# Patient Record
Sex: MALE | Race: WHITE | Employment: FULL TIME | URBAN - METROPOLITAN AREA
[De-identification: names, ages, dates, MRNs, and addresses within clinical notes are randomized per-mention and may not be internally consistent; named-entity substitution may affect disease eponyms.]

---

## 2020-09-18 ENCOUNTER — APPOINTMENT (OUTPATIENT)
Dept: CT IMAGING | Age: 60
End: 2020-09-18
Payer: COMMERCIAL

## 2020-09-18 ENCOUNTER — HOSPITAL ENCOUNTER (EMERGENCY)
Age: 60
Discharge: HOME | End: 2020-09-18
Attending: EMERGENCY MEDICINE
Payer: COMMERCIAL

## 2020-09-18 VITALS
OXYGEN SATURATION: 96 % | WEIGHT: 155 LBS | RESPIRATION RATE: 16 BRPM | BODY MASS INDEX: 20.99 KG/M2 | SYSTOLIC BLOOD PRESSURE: 93 MMHG | HEART RATE: 71 BPM | HEIGHT: 72 IN | DIASTOLIC BLOOD PRESSURE: 56 MMHG | TEMPERATURE: 98 F

## 2020-09-18 PROCEDURE — 70450 CT HEAD/BRAIN W/O DYE: CPT

## 2020-09-18 PROCEDURE — 6370000000 HC RX 637 (ALT 250 FOR IP): Performed by: EMERGENCY MEDICINE

## 2020-09-18 PROCEDURE — 99283 EMERGENCY DEPT VISIT LOW MDM: CPT

## 2020-09-18 PROCEDURE — 12001 RPR S/N/AX/GEN/TRNK 2.5CM/<: CPT

## 2020-09-18 RX ORDER — ACETAMINOPHEN 500 MG
1000 TABLET ORAL ONCE
Status: DISCONTINUED | OUTPATIENT
Start: 2020-09-18 | End: 2020-09-18 | Stop reason: HOSPADM

## 2020-09-18 RX ORDER — DIAPER,BRIEF,INFANT-TODD,DISP
EACH MISCELLANEOUS ONCE
Status: COMPLETED | OUTPATIENT
Start: 2020-09-18 | End: 2020-09-18

## 2020-09-18 RX ADMIN — BACITRACIN: 500 OINTMENT TOPICAL at 09:57

## 2020-09-18 ASSESSMENT — ENCOUNTER SYMPTOMS
EYE DISCHARGE: 0
EYE PAIN: 0
SINUS PRESSURE: 0
BACK PAIN: 0
SORE THROAT: 0
WHEEZING: 0
VOICE CHANGE: 0
CHEST TIGHTNESS: 0
BLOOD IN STOOL: 0
FACIAL SWELLING: 0
DIARRHEA: 0
COUGH: 0
EYE REDNESS: 0
CONSTIPATION: 0
ABDOMINAL PAIN: 0
VOMITING: 0
SHORTNESS OF BREATH: 0
TROUBLE SWALLOWING: 0
STRIDOR: 0
CHOKING: 0

## 2020-09-18 NOTE — ED NOTES
Discharge instructions reviewed with pt and family. Wife will return with a [employer] to transport him home.      Otto Vernon RN  09/18/20 3970

## 2020-09-18 NOTE — ED NOTES
PT transferred himself from bed to his wheelchair as he usual does without difficulty.      Antoina Louis RN  09/18/20 1337

## 2020-09-18 NOTE — ED NOTES
Wound cleansed with H202 to expose wound. Approx 1/2\" scalp lac. Bleeding controlled without a dressing.      Otto Vernon RN  09/18/20 7253

## 2020-09-18 NOTE — ED NOTES
Problem: Pneumonia: Day 4  Goal: Off Pathway (Use only if patient is Off Pathway)  Outcome: Progressing Towards Goal     Problem: Falls - Risk of  Goal: *Absence of Falls  Description  Document Cielo Bustillo Fall Risk and appropriate interventions in the flowsheet. Outcome: Progressing Towards Goal  Pt. Unresponsive, bed locked and in low position, hourly rounds performed. Problem: Seizure Disorder (Adult)  Goal: *STG: Remains free of seizure activity  Outcome: Progressing Towards Goal  Pt. Remains from seizure activity, received the 2nd of IV Keppra. 2214: 1425 [facility] notified about Dr. William Finn, Teleneurologist recommendation to load Pt. With dilantin and reach out to neurologist, hospitalist BODØ also notified about Pt. Do not have DNR sheet on file, ordered to wait till morning, Dr. Soni Ribera will sign the DNR sheet. 2330: Dr. Sai Royal neurologist  notified about Dr. William Finn recommendation, and about no change in Pt. Condition as well. He will call back after reviewing the Pt. EEG.    2345: Dr. Stephen Ruby, neurologist ordered to give Fosphenytoin 1gm. 0030: Dr. Stephen Ruby, neurologist notified about change in Pt. Condition, opening eyes to speech, follows command and still on non rebreather. Ordered not to give Fosphenytoin since Pt. EEG showed no changes and continue with IV dose of Keppra. 0230: 1425 [facility] notified about change in Pt. Condition, unresponsive and not responding to sternal rub as well, Pt. Still on non- rebreather mask due to breathing through mouth, ordered to do Stat ABG's.    0306: Dr. Smith Call ordered to give Narcan 0.4mg.      0400: Dr. Smith Call assessed the Pt. And ordered to repeat CT head, Pt. Taken to CT scan with RN and Tech, Pt. On non-re breather mask during transportation and maintaining O2 sats of 97%     Bedside shift change report given to [address] [place] [address] (oncoming nurse) by Ani Valdez RN (offgoing nurse).  Report included the following information SBAR, To Ct with Yoan Kate RN  09/18/20 3415 Kardex, ED Summary, Procedure Summary, Intake/Output, MAR, Accordion, Recent Results, Med Rec Status, Cardiac Rhythm NSR to Sinus Tach and Quality Measures.
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
## 2020-09-25 ENCOUNTER — OFFICE VISIT (OUTPATIENT)
Dept: FAMILY MEDICINE CLINIC | Age: 60
End: 2020-09-25
Payer: COMMERCIAL

## 2020-09-25 VITALS
SYSTOLIC BLOOD PRESSURE: 118 MMHG | OXYGEN SATURATION: 97 % | HEIGHT: 72 IN | HEART RATE: 97 BPM | RESPIRATION RATE: 12 BRPM | WEIGHT: 155 LBS | DIASTOLIC BLOOD PRESSURE: 84 MMHG | BODY MASS INDEX: 20.99 KG/M2 | TEMPERATURE: 98 F

## 2020-09-25 PROCEDURE — 1036F TOBACCO NON-USER: CPT | Performed by: NURSE PRACTITIONER

## 2020-09-25 PROCEDURE — G8420 CALC BMI NORM PARAMETERS: HCPCS | Performed by: NURSE PRACTITIONER

## 2020-09-25 PROCEDURE — 99213 OFFICE O/P EST LOW 20 MIN: CPT | Performed by: NURSE PRACTITIONER

## 2020-09-25 PROCEDURE — 3017F COLORECTAL CA SCREEN DOC REV: CPT | Performed by: NURSE PRACTITIONER

## 2020-09-25 PROCEDURE — G8427 DOCREV CUR MEDS BY ELIG CLIN: HCPCS | Performed by: NURSE PRACTITIONER

## 2020-09-25 ASSESSMENT — PATIENT HEALTH QUESTIONNAIRE - PHQ9
2. FEELING DOWN, DEPRESSED OR HOPELESS: 0
1. LITTLE INTEREST OR PLEASURE IN DOING THINGS: 0
SUM OF ALL RESPONSES TO PHQ QUESTIONS 1-9: 0
SUM OF ALL RESPONSES TO PHQ9 QUESTIONS 1 & 2: 0
SUM OF ALL RESPONSES TO PHQ QUESTIONS 1-9: 0

## 2020-09-25 NOTE — PROGRESS NOTES
Subjective     Stevens Armida Luna 63 y.o. male presents 9/25/20 with   Chief Complaint   Patient presents with    Suture / Staple Removal     staples in back of head       HPI     Patient was in ED last Friday. Fell backwards in wheelchair and hit his head. Received a head laceration. Went to ED and had 5 staples placed to his laceration. Was told to come back in 7 days to have staples removed. He has been keeping the area clean with iodine. Has not noticed any drainage since the first day. Area is not tender or warm to touch. No fever/chills. Reviewed the following history:    Past Medical History:   Diagnosis Date    Bursitis of shoulder, left 04/29/2008    acute    Hemorrhoids, external 08/05/2004    Hemorrhoids, internal 07/18/2003    Paraplegia ([facility] [address] 04/29/2008    permanent    Spinal cord injury 07/23/2009    with paraplegia     Past Surgical History:   Procedure Laterality Date    COLONOSCOPY  07/18/2003    HEMORRHOID SURGERY  08/05/2004    Dr. Curtis Stewart  07/2008    left/Dr. Leonard Winter     No family history on file. Allergies   Allergen Reactions    Vicodin [Hydrocodone-Acetaminophen] Nausea Only       Current Outpatient Medications on File Prior to Visit   Medication Sig Dispense Refill    Alendronate Sodium (FOSAMAX PO) Take by mouth      clonazePAM (KLONOPIN) 2 MG tablet Take 1 tablet by mouth 2 times daily as needed for Anxiety. 60 tablet 1    ferrous sulfate 325 (65 FE) MG tablet Take 325 mg by mouth daily.  Calcium Carbonate-Vitamin D (CALCIUM + D PO) Take 600 mg by mouth daily.  Omega-3 Fatty Acids (FISH OIL) 500 MG CAPS Take 500 mg by mouth daily.  Cetirizine HCl (ZYRTEC PO) Take  by mouth as needed.  ibuprofen (ADVIL;MOTRIN) 600 MG tablet Take 600 mg by mouth as needed.  Multiple Vitamins-Minerals (CENTRUM SILVER PO) Take  by mouth daily.  levofloxacin (LEVAQUIN) 500 MG tablet Take 1 tablet by mouth daily.  (Patient not taking: Reported on 9/25/2020) 7 tablet 0    Risedronate Sodium (ACTONEL) 150 MG TABS Use  One tablet monthly. (Patient not taking: Reported on 9/25/2020) 1 tablet 6    doxycycline (VIBRAMYCIN) 100 MG capsule Take 1 capsule by mouth 2 times daily. (Patient not taking: Reported on 9/25/2020) 20 capsule 0     No current facility-administered medications on file prior to visit. Review of Systems   Constitutional: Negative for chills and fever. Skin:        Healing laceration to scalp       Objective    Vitals:    09/25/20 1452   BP: 118/84   Site: Right Upper Arm   Position: Sitting   Cuff Size: Medium Adult   Pulse: 97   Resp: 12   Temp: 98 °F (36.7 °C)   TempSrc: Temporal   SpO2: 97%   Weight: 155 lb (70.3 kg)   Height: 6' (1.829 m)       Physical Exam  Constitutional:       General: He is not in acute distress. Appearance: Normal appearance. He is not ill-appearing or toxic-appearing. HENT:      Head: Normocephalic and atraumatic. Right Ear: Hearing and external ear normal.      Left Ear: Hearing and external ear normal.   Eyes:      General: Lids are normal.   Neck:      Musculoskeletal: Full passive range of motion without pain, normal range of motion and neck supple. Cardiovascular:      Rate and Rhythm: Normal rate. Pulmonary:      Effort: Pulmonary effort is normal. No respiratory distress. Skin:     General: Skin is warm and dry. Neurological:      General: No focal deficit present. Mental Status: He is alert and oriented to person, place, and time. Assessment and Plan    Emily Pettibone was seen today for suture / staple removal.    Diagnoses and all orders for this visit:    Laceration of scalp without foreign body, subsequent encounter    Encounter for staple removal  -      - PA REMOVAL OF SUTURES      Procedures:  Healing laceration observed to occipital scalp. Large scab to area. Edges are well approximated. No open areas or drainage observed. No sign of infection. Area cleansed with betadine. 5 staples removed from laceration without difficulty. Area cleansed again with betadine. Return if symptoms worsen or fail to improve, for follow up with PCP. Side effects and adverse effects of any medication prescribed today, as well as treatment plan/rationale, follow-up care, and result expectations have been discussed with the patient. Expresses understanding and desires to proceed with treatment plan. Discussed signs and symptoms which require immediate follow-up in ED/call to 911. Understanding verbalized. I have reviewed and updated the electronic medical record.     Madai Monaco, RASHEED - CNP

## 2021-04-07 ENCOUNTER — HOSPITAL ENCOUNTER (INPATIENT)
Age: 61
LOS: 8 days | Discharge: ANOTHER HOSPITAL | DRG: 085 | End: 2021-04-15
Attending: SURGERY | Admitting: SURGERY
Payer: COMMERCIAL

## 2021-04-07 ENCOUNTER — APPOINTMENT (OUTPATIENT)
Dept: CT IMAGING | Age: 61
DRG: 085 | End: 2021-04-07
Payer: COMMERCIAL

## 2021-04-07 ENCOUNTER — APPOINTMENT (OUTPATIENT)
Dept: GENERAL RADIOLOGY | Age: 61
End: 2021-04-07
Payer: COMMERCIAL

## 2021-04-07 ENCOUNTER — APPOINTMENT (OUTPATIENT)
Dept: CT IMAGING | Age: 61
End: 2021-04-07
Payer: COMMERCIAL

## 2021-04-07 ENCOUNTER — HOSPITAL ENCOUNTER (EMERGENCY)
Age: 61
Discharge: ANOTHER HOSPITAL | End: 2021-04-07
Attending: EMERGENCY MEDICINE
Payer: COMMERCIAL

## 2021-04-07 VITALS
WEIGHT: 155 LBS | DIASTOLIC BLOOD PRESSURE: 70 MMHG | TEMPERATURE: 97 F | HEIGHT: 72 IN | SYSTOLIC BLOOD PRESSURE: 106 MMHG | RESPIRATION RATE: 20 BRPM | HEART RATE: 80 BPM | BODY MASS INDEX: 20.99 KG/M2 | OXYGEN SATURATION: 97 %

## 2021-04-07 DIAGNOSIS — R41.0 DELIRIUM: ICD-10-CM

## 2021-04-07 DIAGNOSIS — G82.20 PARAPLEGIA (HCC): ICD-10-CM

## 2021-04-07 DIAGNOSIS — E87.1 HYPONATREMIA: ICD-10-CM

## 2021-04-07 DIAGNOSIS — S09.90XA CLOSED HEAD INJURY, INITIAL ENCOUNTER: Primary | ICD-10-CM

## 2021-04-07 DIAGNOSIS — I60.9 SUBARACHNOID BLEED (HCC): Primary | ICD-10-CM

## 2021-04-07 DIAGNOSIS — I60.9 SUBARACHNOID BLEED (HCC): ICD-10-CM

## 2021-04-07 PROBLEM — Z83.511 FAMILY HISTORY OF GLAUCOMA IN FATHER: Status: ACTIVE | Noted: 2017-10-19

## 2021-04-07 PROBLEM — H52.13 MYOPIA, BILATERAL: Status: ACTIVE | Noted: 2017-10-19

## 2021-04-07 PROBLEM — N39.0 URINARY TRACT INFECTION WITHOUT HEMATURIA: Status: ACTIVE | Noted: 2018-05-18

## 2021-04-07 PROBLEM — H52.4 BILATERAL PRESBYOPIA: Status: ACTIVE | Noted: 2017-10-19

## 2021-04-07 PROBLEM — R30.0 DYSURIA: Status: ACTIVE | Noted: 2018-05-18

## 2021-04-07 PROBLEM — R91.1 PULMONARY NODULE, LEFT: Status: ACTIVE | Noted: 2019-03-01

## 2021-04-07 LAB
ALBUMIN SERPL-MCNC: 4.5 G/DL (ref 3.5–4.6)
ALP BLD-CCNC: 76 U/L (ref 35–104)
ALT SERPL-CCNC: 23 U/L (ref 0–41)
ANION GAP SERPL CALCULATED.3IONS-SCNC: 11 MEQ/L (ref 9–15)
ANION GAP SERPL CALCULATED.3IONS-SCNC: 14 MEQ/L (ref 9–15)
AST SERPL-CCNC: 22 U/L (ref 0–40)
BASOPHILS ABSOLUTE: 0 K/UL (ref 0–0.2)
BASOPHILS RELATIVE PERCENT: 0.6 %
BILIRUB SERPL-MCNC: 0.6 MG/DL (ref 0.2–0.7)
BUN BLDV-MCNC: 21 MG/DL (ref 6–20)
BUN BLDV-MCNC: 26 MG/DL (ref 6–20)
CALCIUM SERPL-MCNC: 8.7 MG/DL (ref 8.5–9.9)
CALCIUM SERPL-MCNC: 9.7 MG/DL (ref 8.5–9.9)
CHLORIDE BLD-SCNC: 88 MEQ/L (ref 95–107)
CHLORIDE BLD-SCNC: 90 MEQ/L (ref 95–107)
CO2: 22 MEQ/L (ref 20–31)
CO2: 25 MEQ/L (ref 20–31)
CREAT SERPL-MCNC: 0.32 MG/DL (ref 0.7–1.2)
CREAT SERPL-MCNC: 0.48 MG/DL (ref 0.7–1.2)
EOSINOPHILS ABSOLUTE: 0 K/UL (ref 0–0.7)
EOSINOPHILS RELATIVE PERCENT: 0.6 %
GFR AFRICAN AMERICAN: >60
GFR AFRICAN AMERICAN: >60
GFR NON-AFRICAN AMERICAN: >60
GFR NON-AFRICAN AMERICAN: >60
GLOBULIN: 2.5 G/DL (ref 2.3–3.5)
GLUCOSE BLD-MCNC: 124 MG/DL (ref 70–99)
GLUCOSE BLD-MCNC: 92 MG/DL (ref 70–99)
HCT VFR BLD CALC: 34.6 % (ref 42–52)
HEMOGLOBIN: 11.7 G/DL (ref 14–18)
INR BLD: 1.1
LYMPHOCYTES ABSOLUTE: 0.2 K/UL (ref 1–4.8)
LYMPHOCYTES RELATIVE PERCENT: 6.9 %
MCH RBC QN AUTO: 30.6 PG (ref 27–31.3)
MCHC RBC AUTO-ENTMCNC: 33.8 % (ref 33–37)
MCV RBC AUTO: 90.6 FL (ref 80–100)
MONOCYTES ABSOLUTE: 0.3 K/UL (ref 0.2–0.8)
MONOCYTES RELATIVE PERCENT: 11.4 %
NEUTROPHILS ABSOLUTE: 2.4 K/UL (ref 1.4–6.5)
NEUTROPHILS RELATIVE PERCENT: 80.5 %
PDW BLD-RTO: 13 % (ref 11.5–14.5)
PLATELET # BLD: 209 K/UL (ref 130–400)
POTASSIUM SERPL-SCNC: 4.2 MEQ/L (ref 3.4–4.9)
POTASSIUM SERPL-SCNC: 4.4 MEQ/L (ref 3.4–4.9)
PROTHROMBIN TIME: 13.8 SEC (ref 12.3–14.9)
RBC # BLD: 3.82 M/UL (ref 4.7–6.1)
SARS-COV-2, NAAT: NOT DETECTED
SODIUM BLD-SCNC: 124 MEQ/L (ref 135–144)
SODIUM BLD-SCNC: 126 MEQ/L (ref 135–144)
TOTAL PROTEIN: 7 G/DL (ref 6.3–8)
WBC # BLD: 2.9 K/UL (ref 4.8–10.8)

## 2021-04-07 PROCEDURE — APPSS15 APP SPLIT SHARED TIME 0-15 MINUTES: Performed by: PHYSICIAN ASSISTANT

## 2021-04-07 PROCEDURE — 2580000003 HC RX 258: Performed by: EMERGENCY MEDICINE

## 2021-04-07 PROCEDURE — 6360000002 HC RX W HCPCS: Performed by: EMERGENCY MEDICINE

## 2021-04-07 PROCEDURE — 51702 INSERT TEMP BLADDER CATH: CPT

## 2021-04-07 PROCEDURE — 87635 SARS-COV-2 COVID-19 AMP PRB: CPT

## 2021-04-07 PROCEDURE — 80048 BASIC METABOLIC PNL TOTAL CA: CPT

## 2021-04-07 PROCEDURE — 96365 THER/PROPH/DIAG IV INF INIT: CPT

## 2021-04-07 PROCEDURE — 2000000000 HC ICU R&B

## 2021-04-07 PROCEDURE — 2580000003 HC RX 258: Performed by: PHYSICIAN ASSISTANT

## 2021-04-07 PROCEDURE — 36415 COLL VENOUS BLD VENIPUNCTURE: CPT

## 2021-04-07 PROCEDURE — 70450 CT HEAD/BRAIN W/O DYE: CPT

## 2021-04-07 PROCEDURE — 6360000002 HC RX W HCPCS: Performed by: PHYSICIAN ASSISTANT

## 2021-04-07 PROCEDURE — 85025 COMPLETE CBC W/AUTO DIFF WBC: CPT

## 2021-04-07 PROCEDURE — 72125 CT NECK SPINE W/O DYE: CPT

## 2021-04-07 PROCEDURE — 6360000002 HC RX W HCPCS: Performed by: SURGERY

## 2021-04-07 PROCEDURE — 99285 EMERGENCY DEPT VISIT HI MDM: CPT

## 2021-04-07 PROCEDURE — 85610 PROTHROMBIN TIME: CPT

## 2021-04-07 PROCEDURE — 96374 THER/PROPH/DIAG INJ IV PUSH: CPT

## 2021-04-07 PROCEDURE — 80053 COMPREHEN METABOLIC PANEL: CPT

## 2021-04-07 PROCEDURE — 2500000003 HC RX 250 WO HCPCS: Performed by: SURGERY

## 2021-04-07 PROCEDURE — 99284 EMERGENCY DEPT VISIT MOD MDM: CPT

## 2021-04-07 PROCEDURE — 73080 X-RAY EXAM OF ELBOW: CPT

## 2021-04-07 PROCEDURE — 2580000003 HC RX 258: Performed by: SURGERY

## 2021-04-07 RX ORDER — SODIUM CHLORIDE 0.9 % (FLUSH) 0.9 %
10 SYRINGE (ML) INJECTION EVERY 12 HOURS SCHEDULED
Status: DISCONTINUED | OUTPATIENT
Start: 2021-04-07 | End: 2021-04-15 | Stop reason: HOSPADM

## 2021-04-07 RX ORDER — LORAZEPAM 2 MG/ML
1 INJECTION INTRAMUSCULAR ONCE
Status: COMPLETED | OUTPATIENT
Start: 2021-04-07 | End: 2021-04-07

## 2021-04-07 RX ORDER — ALENDRONATE SODIUM 70 MG/1
70 TABLET ORAL
COMMUNITY

## 2021-04-07 RX ORDER — HYDRALAZINE HYDROCHLORIDE 20 MG/ML
10 INJECTION INTRAMUSCULAR; INTRAVENOUS EVERY 4 HOURS PRN
Status: DISCONTINUED | OUTPATIENT
Start: 2021-04-07 | End: 2021-04-08

## 2021-04-07 RX ORDER — SODIUM CHLORIDE 9 MG/ML
INJECTION, SOLUTION INTRAVENOUS CONTINUOUS
Status: DISCONTINUED | OUTPATIENT
Start: 2021-04-07 | End: 2021-04-11

## 2021-04-07 RX ORDER — ALBUTEROL SULFATE 2.5 MG/3ML
SOLUTION RESPIRATORY (INHALATION)
COMMUNITY
Start: 2021-03-29

## 2021-04-07 RX ORDER — DIAPER,BRIEF,INFANT-TODD,DISP
EACH MISCELLANEOUS 2 TIMES DAILY
Status: DISCONTINUED | OUTPATIENT
Start: 2021-04-07 | End: 2021-04-07 | Stop reason: HOSPADM

## 2021-04-07 RX ORDER — SODIUM CHLORIDE 9 MG/ML
25 INJECTION, SOLUTION INTRAVENOUS PRN
Status: DISCONTINUED | OUTPATIENT
Start: 2021-04-07 | End: 2021-04-15 | Stop reason: HOSPADM

## 2021-04-07 RX ORDER — LORAZEPAM 2 MG/ML
2 INJECTION INTRAMUSCULAR ONCE
Status: COMPLETED | OUTPATIENT
Start: 2021-04-07 | End: 2021-04-07

## 2021-04-07 RX ORDER — ACETAMINOPHEN 325 MG/1
650 TABLET ORAL EVERY 4 HOURS PRN
Status: DISCONTINUED | OUTPATIENT
Start: 2021-04-07 | End: 2021-04-07

## 2021-04-07 RX ORDER — LABETALOL HYDROCHLORIDE 5 MG/ML
20 INJECTION, SOLUTION INTRAVENOUS
Status: DISCONTINUED | OUTPATIENT
Start: 2021-04-07 | End: 2021-04-08

## 2021-04-07 RX ORDER — ACETAMINOPHEN 325 MG/1
650 TABLET ORAL EVERY 4 HOURS PRN
Status: DISCONTINUED | OUTPATIENT
Start: 2021-04-07 | End: 2021-04-15 | Stop reason: HOSPADM

## 2021-04-07 RX ORDER — PROMETHAZINE HYDROCHLORIDE 12.5 MG/1
12.5 TABLET ORAL EVERY 6 HOURS PRN
Status: DISCONTINUED | OUTPATIENT
Start: 2021-04-07 | End: 2021-04-15 | Stop reason: HOSPADM

## 2021-04-07 RX ORDER — SODIUM CHLORIDE 0.9 % (FLUSH) 0.9 %
10 SYRINGE (ML) INJECTION PRN
Status: DISCONTINUED | OUTPATIENT
Start: 2021-04-07 | End: 2021-04-15 | Stop reason: HOSPADM

## 2021-04-07 RX ORDER — CETIRIZINE HYDROCHLORIDE, PSEUDOEPHEDRINE HYDROCHLORIDE 5; 120 MG/1; MG/1
1 TABLET, FILM COATED, EXTENDED RELEASE ORAL 2 TIMES DAILY
COMMUNITY
Start: 2021-01-18

## 2021-04-07 RX ORDER — ONDANSETRON 2 MG/ML
4 INJECTION INTRAMUSCULAR; INTRAVENOUS EVERY 6 HOURS PRN
Status: DISCONTINUED | OUTPATIENT
Start: 2021-04-07 | End: 2021-04-15 | Stop reason: HOSPADM

## 2021-04-07 RX ORDER — 0.9 % SODIUM CHLORIDE 0.9 %
1000 INTRAVENOUS SOLUTION INTRAVENOUS ONCE
Status: COMPLETED | OUTPATIENT
Start: 2021-04-07 | End: 2021-04-07

## 2021-04-07 RX ADMIN — HYDROMORPHONE HYDROCHLORIDE 0.5 MG: 1 INJECTION, SOLUTION INTRAMUSCULAR; INTRAVENOUS; SUBCUTANEOUS at 23:26

## 2021-04-07 RX ADMIN — SODIUM CHLORIDE 1000 ML: 9 INJECTION, SOLUTION INTRAVENOUS at 15:09

## 2021-04-07 RX ADMIN — LEVETIRACETAM 500 MG: 500 INJECTION, SOLUTION, CONCENTRATE INTRAVENOUS at 13:56

## 2021-04-07 RX ADMIN — LORAZEPAM 1 MG: 2 INJECTION INTRAMUSCULAR; INTRAVENOUS at 15:09

## 2021-04-07 RX ADMIN — HYDRALAZINE HYDROCHLORIDE 10 MG: 20 INJECTION INTRAMUSCULAR; INTRAVENOUS at 20:18

## 2021-04-07 RX ADMIN — LORAZEPAM 1 MG: 2 INJECTION INTRAMUSCULAR; INTRAVENOUS at 15:08

## 2021-04-07 RX ADMIN — SODIUM CHLORIDE: 9 INJECTION, SOLUTION INTRAVENOUS at 20:16

## 2021-04-07 RX ADMIN — LORAZEPAM 2 MG: 2 INJECTION INTRAMUSCULAR; INTRAVENOUS at 16:15

## 2021-04-07 RX ADMIN — SODIUM CHLORIDE 0.4 MCG/KG/HR: 9 INJECTION, SOLUTION INTRAVENOUS at 19:27

## 2021-04-07 RX ADMIN — Medication 10 ML: at 20:22

## 2021-04-07 RX ADMIN — LORAZEPAM 2 MG: 2 INJECTION INTRAMUSCULAR; INTRAVENOUS at 18:20

## 2021-04-07 ASSESSMENT — PAIN SCALES - GENERAL
PAINLEVEL_OUTOF10: 0
PAINLEVEL_OUTOF10: 4

## 2021-04-07 ASSESSMENT — ENCOUNTER SYMPTOMS
SHORTNESS OF BREATH: 0
VOMITING: 0
FACIAL SWELLING: 0
CHEST TIGHTNESS: 0
COUGH: 0
STRIDOR: 0
EYE DISCHARGE: 0
CONSTIPATION: 0
DIARRHEA: 0
TROUBLE SWALLOWING: 0
VOICE CHANGE: 0
WHEEZING: 0
SINUS PRESSURE: 0
EYE PAIN: 0
EYE REDNESS: 0
SORE THROAT: 0
BLOOD IN STOOL: 0
BACK PAIN: 0
CHOKING: 0
ABDOMINAL PAIN: 0

## 2021-04-07 ASSESSMENT — PAIN SCALES - PAIN ASSESSMENT IN ADVANCED DEMENTIA (PAINAD)
BODYLANGUAGE: 1
BREATHING: 0
TOTALSCORE: 6
FACIALEXPRESSION: 2

## 2021-04-07 NOTE — ED NOTES
Report to [facility] ICU at bedside in ER. Wife updated also. She states pt just got his covid vaccine 1-2 days ago     Jeremiah Chau. Keegan Montgomery RN  04/07/21 Kalpesh Denton 0278  Keegan Montgomery RN  04/07/21 4334
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
## 2021-04-07 NOTE — ED PROVIDER NOTES
[address] [facility] ED  EMERGENCY DEPARTMENT ENCOUNTER      Pt Name: Farhat Loo  MRN: [id number]  [place] 1960  Date of evaluation: 4/7/2021  Provider: Ramona Lu PA-C    CHIEF COMPLAINT       Chief Complaint   Patient presents with    Head Injury     transfer from [facility] for Head injury [facility] bleed         HISTORY OF PRESENT ILLNESS   (Location/Symptom, Timing/Onset, Context/Setting, Quality, Duration, Modifying Factors, Severity)  Note limiting factors. Farhat Loo is a 62 y.o. male who presents to the emergency department as a transfer from outside facility after patient was found to have bifrontal subarachnoid hemorrhages. Patient received IV Keppra in the emergency department at the request of neurosurgery. Patient is alert to self only but disoriented to time and situation and is unable to provide any further insight into the nature of his injury. See note below for HPI from [facility]. Lo. is a 62 y.o. male who presents to the emergency department patient with a spinal cord injury and paraplegic lives with his wife wheelchair-bound and patient fell backward and injured back of his  Head has a slight abrasion to the right elbow patient has no pain and does not wish to, and requested a*lift assist but instead paramedics brought patient here for further evaluation of because there is a bump in the back of his head      HPI    Nursing Notes were reviewed. REVIEW OF SYSTEMS    (2-9 systems for level 4, 10 or more for level 5)     Review of Systems   Unable to perform ROS: Mental status change       Except as noted above the remainder of the review of systems was reviewed and negative.        PAST MEDICAL HISTORY     Past Medical History:   Diagnosis Date    Bursitis of shoulder, left 04/29/2008    acute    Hemorrhoids, external 08/05/2004    Hemorrhoids, internal 07/18/2003    Paraplegia ([facility] [address] 04/29/2008    permanent    Spinal cord injury 07/23/2009    with paraplegia         SURGICAL HISTORY       Past Surgical History:   Procedure Laterality Date    COLONOSCOPY  07/18/2003    HEMORRHOID SURGERY  08/05/2004    Dr. Elif Mercado  07/2008    left/Dr. Delmi Roldan         CURRENT MEDICATIONS       Previous Medications    ALENDRONATE SODIUM (FOSAMAX PO)    Take by mouth    CALCIUM CARBONATE-VITAMIN D (CALCIUM + D PO)    Take 600 mg by mouth daily. CETIRIZINE HCL (ZYRTEC PO)    Take  by mouth as needed. CLONAZEPAM (KLONOPIN) 2 MG TABLET    Take 1 tablet by mouth 2 times daily as needed for Anxiety. DOXYCYCLINE (VIBRAMYCIN) 100 MG CAPSULE    Take 1 capsule by mouth 2 times daily. FERROUS SULFATE 325 (65 FE) MG TABLET    Take 325 mg by mouth daily. IBUPROFEN (ADVIL;MOTRIN) 600 MG TABLET    Take 600 mg by mouth as needed. LEVOFLOXACIN (LEVAQUIN) 500 MG TABLET    Take 1 tablet by mouth daily. MULTIPLE VITAMINS-MINERALS (CENTRUM SILVER PO)    Take  by mouth daily. OMEGA-3 FATTY ACIDS (FISH OIL) 500 MG CAPS    Take 500 mg by mouth daily. RISEDRONATE SODIUM (ACTONEL) 150 MG TABS    Use  One tablet monthly. ALLERGIES     Vicodin [hydrocodone-acetaminophen]    FAMILY HISTORY     No family history on file.        SOCIAL HISTORY       Social History     Socioeconomic History    Marital status:      Spouse name: Not on file    Number of children: Not on file    Years of education: Not on file    Highest education level: Not on file   Occupational History    Not on file   Social Needs    Financial resource strain: Not on file    Food insecurity     Worry: Not on file     Inability: Not on file    Transportation needs     Medical: Not on file     Non-medical: Not on file   Tobacco Use    Smoking status: Never Smoker    Smokeless tobacco: Never Used   Substance and Sexual Activity    Alcohol use: No     Comment: denies    Drug use: Not on file    Sexual activity: Not on file   Lifestyle    Physical activity disoriented. GCS: GCS eye subscore is 4. GCS verbal subscore is 2. GCS motor subscore is 5. Motor: No seizure activity. Psychiatric:         Attention and Perception: He is inattentive. Speech: He is noncommunicative. Behavior: Behavior is agitated. Cognition and Memory: Cognition is impaired. Memory is impaired. DIAGNOSTIC RESULTS     EKG: All EKG's are interpreted by the Emergency Department Physician who either signs or Co-signs this chart in the absence of a cardiologist.      RADIOLOGY:   Non-plain film images such as CT, Ultrasound and MRI are read by the radiologist. Plain radiographic images are visualized and preliminarily interpreted by the emergency physician with the below findings:      Interpretation per the Radiologist below, if available at the time of this note:    No orders to display         ED BEDSIDE ULTRASOUND:   Performed by ED Physician - none    LABS:  Labs Reviewed - No data to display    All other labs were within normal range or not returned as of this dictation. EMERGENCY DEPARTMENT COURSE and DIFFERENTIAL DIAGNOSIS/MDM:   Vitals: There were no vitals filed for this visit. MDM      REASSESSMENT        Patient was seen in the emergency department today after being transferred from outside facility due to bifrontal subarachnoid hemorrhages. Patient was seen in the emergency department by trauma. Given patient's agitation he was placed in soft restraints and given Ativan 2 mg IV. Patient will be admitted into the hospital under trauma service for further evaluation and care  Patient has remained hemodynamically stable while in the emergency department. Patient is paraplegic which is a chronic condition      CRITICAL CARE TIME   Total Critical Care time was 42 minutes, excluding separately reportable procedures.   There was a high probability of clinically significant/life threatening deterioration in the patient's condition which required my urgent intervention. CONSULTS:  None    PROCEDURES:  Unless otherwise noted below, none     Procedures        FINAL IMPRESSION      1. Subarachnoid bleed ([facility] [address]    2. Delirium          DISPOSITION/PLAN   DISPOSITION Decision To Admit 04/07/2021 03:07:42 PM      PATIENT REFERRED TO:  No follow-up provider specified. DISCHARGE MEDICATIONS:  New Prescriptions    No medications on file     Controlled Substances Monitoring:     No flowsheet data found.     (Please note that portions of this note were completed with a voice recognition program.  Efforts were made to edit the dictations but occasionally words are mis-transcribed.)    Kaleigh Patterson PA-C (electronically signed)  Attending Emergency Physician            Kaleigh Patterson PA-C  04/07/21 Madison Nowak PA-C  04/07/21 5315
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
## 2021-04-07 NOTE — ED NOTES
Patient swab for COVID- 19 by Katy Renae. COVID-19 specimen sent to lab.       Kris Mix  04/07/21 1536       Indio Brown  04/07/21 1538

## 2021-04-07 NOTE — PROGRESS NOTES
Patient arrived to ICU from ER with 4 round brisk pupils but does not follow commands or answer questions appropriately but is alert. Wife is at bedside and was able to answer all admission questions. Patients med rec was confirmed by [facility].

## 2021-04-07 NOTE — ED NOTES
Vitals reassessed; patient is more calm at this time. MSP's intact, resp are even and non-labored, no acute distress noted. 1:1 remains in place.       John Macias  04/07/21 1549       Indio Valverde  04/07/21 1558

## 2021-04-07 NOTE — H&P
motion; and No bloody discharge; Throat: Oral cavity without trauma   Neck: No midline tenderness and No lacerations/wounds  Pulmonary: External exam: no crepitus or pain with palpation, no contusions or abrasions; and Lung exam: breath sounds clear, no wheezes, no rales  Cardiovascular:    Pulses: Not examined; Abdomen: Appearance: Non-distended, No scars, lacerations, contusions; and Palpation: no tenderness   Rectal: Not performed  Pelvis/Perineum: Pelvis is stable to palpation;  Musculoskeletal:    Back/Spine: Thoracolumbar spinal column non-tender; no step off or deformity; Extremities: Small superficial abrasion to right elbow. No gross upper or lower extremity signs of trauma;    PAST MEDICAL HISTORY:  Past Medical History:   Diagnosis Date    Bursitis of shoulder, left 04/29/2008    acute    Hemorrhoids, external 08/05/2004    Hemorrhoids, internal 07/18/2003    Paraplegia ([facility] [address] 04/29/2008    permanent    Spinal cord injury 07/23/2009    with paraplegia       PAST SURGICAL HISTORY:  Past Surgical History:   Procedure Laterality Date    COLONOSCOPY  07/18/2003    HEMORRHOID SURGERY  08/05/2004    Dr. Michael Madrid  07/2008    left/Dr. Carla Keith       PRE-ADMISSION MEDICATIONS:   Prior to Admission medications    Medication Sig Start Date End Date Taking? Authorizing Provider   cetirizine-psuedoephedrine (ZYRTEC-D) 5-120 MG per extended release tablet Take 1 tablet by mouth 2 times daily 1/18/21  Yes Historical Provider, MD   albuterol (PROVENTIL) (2.5 MG/3ML) 0.083% nebulizer solution USE 1 VIAL IN NEBULIZER TWICE DAILY 3/29/21  Yes Historical Provider, MD   Alendronate Sodium (FOSAMAX PO) Take by mouth    Historical Provider, MD   clonazePAM (KLONOPIN) 2 MG tablet Take 1 tablet by mouth 2 times daily as needed for Anxiety. 2/25/13   Julienne Martinez MD   Risedronate Sodium (ACTONEL) 150 MG TABS Use  One tablet monthly.   Patient not taking: Reported on 9/25/2020 6/29/12   Claudio Albarran MD Mp   ferrous sulfate 325 (65 FE) MG tablet Take 325 mg by mouth daily. Historical Provider, MD   Calcium Carbonate-Vitamin D (CALCIUM + D PO) Take 600 mg by mouth daily. Historical Provider, MD   Omega-3 Fatty Acids (FISH OIL) 500 MG CAPS Take 500 mg by mouth daily. Historical Provider, MD   ibuprofen (ADVIL;MOTRIN) 600 MG tablet Take 600 mg by mouth as needed. Historical Provider, MD   Multiple Vitamins-Minerals (CENTRUM SILVER PO) Take  by mouth daily.       Historical Provider, MD       ALLERGIES:  Vicodin [hydrocodone-acetaminophen]    SOCIAL HISTORY:   Social History     Socioeconomic History    Marital status:      Spouse name: Not on file    Number of children: Not on file    Years of education: Not on file    Highest education level: Not on file   Occupational History    Not on file   Social Needs    Financial resource strain: Not on file    Food insecurity     Worry: Not on file     Inability: Not on file    Transportation needs     Medical: Not on file     Non-medical: Not on file   Tobacco Use    Smoking status: Never Smoker    Smokeless tobacco: Never Used   Substance and Sexual Activity    Alcohol use: No     Comment: denies    Drug use: Not on file    Sexual activity: Not on file   Lifestyle    Physical activity     Days per week: Not on file     Minutes per session: Not on file    Stress: Not on file   Relationships    Social connections     Talks on phone: Not on file     Gets together: Not on file     Attends Hoahaoism service: Not on file     Active member of club or organization: Not on file     Attends meetings of clubs or organizations: Not on file     Relationship status: Not on file    Intimate partner violence     Fear of current or ex partner: Not on file     Emotionally abused: Not on file     Physically abused: Not on file     Forced sexual activity: Not on file   Other Topics Concern    Not on file   Social History Narrative    Not on file       FAMILY HISTORY:  No family history on file. REVIEW OF SYSTEMS: Unable to obtain secondary to mental status     BASIC LABS:  CBC with Differential:    Lab Results   Component Value Date    WBC 2.9 04/07/2021    RBC 3.82 04/07/2021    HGB 11.7 04/07/2021    HCT 34.6 04/07/2021     04/07/2021    MCV 90.6 04/07/2021    MCH 30.6 04/07/2021    MCHC 33.8 04/07/2021    RDW 13.0 04/07/2021    LYMPHOPCT 6.9 04/07/2021    MONOPCT 11.4 04/07/2021    BASOPCT 0.6 04/07/2021    MONOSABS 0.3 04/07/2021    LYMPHSABS 0.2 04/07/2021    EOSABS 0.0 04/07/2021    BASOSABS 0.0 04/07/2021     CMP:   Lab Results   Component Value Date     (L) 04/07/2021    K 4.4 04/07/2021    CL 88 (L) 04/07/2021    CO2 25 04/07/2021    BUN 26 (H) 04/07/2021    CREATININE 0.48 (L) 04/07/2021    GLUCOSE 92 04/07/2021    CALCIUM 9.7 04/07/2021    PROT 7.0 04/07/2021    LABALBU 4.5 04/07/2021    BILITOT 0.6 04/07/2021    ALKPHOS 76 04/07/2021    AST 22 04/07/2021    ALT 23 04/07/2021    LABGLOM >60.0 04/07/2021    GFRAA >60.0 04/07/2021    GLOB 2.5 04/07/2021     Magnesium: No results found for: MG  Troponin: No results found for: TROPONINI  PT/INR:   Recent Labs     04/07/21  1405   PROTIME 13.8   INR 1.1     APTT: No results for input(s): APTT in the last 72 hours. EtOH: No results found for: ETOH    RADIOLOGY: (Personally reviewed and Per Radiology Report)  CT HEAD 1:30pm: Bifrontal subarachnoid hemorrhage is seen. Additional plain films:  XR Right Elbow: No acute fracture seen      ASSESSMENT and PLAN:  Ronit Kellogg is a 62 y.o. male with a PMHx of paraplegia presents at transfer from [facility] after patient was found to have bifrontal subarachnoid hemorrhage. Per reports patient was found down at his home by a [employer] . Trauma workup found bifrontal subarachnoid hemorrhage, hyponatremia (Na 124) and associated AMS.       Neurological: bifrontal subarachnoid hemorrhage, altered mental status 2/2 TBI and hyponatremia. Given 500cc bolus of Keppra at [facility] prior to transfer. - NSGY consulted  - q 2hr neurochecks until stable repeat CT head  - Repeat CT Head at 7pm tonight  - Will hold off on formal SLP until improved mentation     Cardiovascular:   No acute cardiovascular issues. Respiratory:   No acute respiratory issues. - Maintain O2 sat >92% on room air  - Continue pulmonary toilet and encourage IS. GI/Diet:  - NPO until stable repeat CT head  - Bowel regimen with daily Senna, Colace, and PRN Dulcolax. Renal/Electrolytes:  hyponatremia (Na 124). - IVF with NS @ 100cc/hr  - repeat BMP at 6pm  - Daily BMP     ID:   No active infection. Remains afebrile, normotensive, and without leukocytosis. - No indication for Abx     Heme:   No acute hematologic issues.  - No indication for transfusion.  - Daily CBC. Endocrine:   No acute glycemic issues. MSK: PMHx of paraplegia  - Activity: Bed rest until repeat CT head. - Spines: Pending CT C-spine  - Weight bearing restrictions: None  - PT/OT: Will hold off on formal consult until improved MS    Prophylaxis:   - SCDs only, No chemo PPx given bleed risk  - No GI PPx indicated      Dispo: Admit to trauma service for neuro checks, repeat CT head and formal NSGY consult/evaluation. Emilee Grullon PA-C  Trauma/Critical Care/General Surgery  [phone] ([phone])  [phone]     This patient's plan of care was discussed and made in collaboration with Trauma Attending physician, Moris Cox MD.    Teaching Physician Note:  I saw and evaluated the patient. I personally obtained the key and critical portions of the history and physical exam.  I reviewed the DARRICK's documentation and discussed the patient with the DARRICK. I agree with the DARRICK's medical decision making as documented in the DARRICK note.     Critical care was necessary because of an illness or injury that acutely impaired one or more vital organs systems such that there

## 2021-04-08 ENCOUNTER — APPOINTMENT (OUTPATIENT)
Dept: CT IMAGING | Age: 61
DRG: 085 | End: 2021-04-08
Payer: COMMERCIAL

## 2021-04-08 LAB
ANION GAP SERPL CALCULATED.3IONS-SCNC: 12 MEQ/L (ref 9–15)
ANION GAP SERPL CALCULATED.3IONS-SCNC: 13 MEQ/L (ref 9–15)
ANION GAP SERPL CALCULATED.3IONS-SCNC: 9 MEQ/L (ref 9–15)
BASOPHILS ABSOLUTE: 0 K/UL (ref 0–0.2)
BASOPHILS RELATIVE PERCENT: 0.1 %
BUN BLDV-MCNC: 15 MG/DL (ref 6–20)
BUN BLDV-MCNC: 15 MG/DL (ref 6–20)
BUN BLDV-MCNC: 16 MG/DL (ref 6–20)
CALCIUM SERPL-MCNC: 8.6 MG/DL (ref 8.5–9.9)
CALCIUM SERPL-MCNC: 9 MG/DL (ref 8.5–9.9)
CALCIUM SERPL-MCNC: 9 MG/DL (ref 8.5–9.9)
CHLORIDE BLD-SCNC: 93 MEQ/L (ref 95–107)
CHLORIDE BLD-SCNC: 96 MEQ/L (ref 95–107)
CHLORIDE BLD-SCNC: 98 MEQ/L (ref 95–107)
CO2: 21 MEQ/L (ref 20–31)
CO2: 23 MEQ/L (ref 20–31)
CO2: 24 MEQ/L (ref 20–31)
CREAT SERPL-MCNC: 0.36 MG/DL (ref 0.7–1.2)
CREAT SERPL-MCNC: 0.43 MG/DL (ref 0.7–1.2)
CREAT SERPL-MCNC: 0.44 MG/DL (ref 0.7–1.2)
EOSINOPHILS ABSOLUTE: 0 K/UL (ref 0–0.7)
EOSINOPHILS RELATIVE PERCENT: 0 %
GFR AFRICAN AMERICAN: >60
GFR NON-AFRICAN AMERICAN: >60
GLUCOSE BLD-MCNC: 119 MG/DL (ref 70–99)
GLUCOSE BLD-MCNC: 125 MG/DL (ref 70–99)
GLUCOSE BLD-MCNC: 136 MG/DL (ref 70–99)
HCT VFR BLD CALC: 33.2 % (ref 42–52)
HEMOGLOBIN: 11.6 G/DL (ref 14–18)
LYMPHOCYTES ABSOLUTE: 0.3 K/UL (ref 1–4.8)
LYMPHOCYTES RELATIVE PERCENT: 5.4 %
MCH RBC QN AUTO: 30.6 PG (ref 27–31.3)
MCHC RBC AUTO-ENTMCNC: 35.1 % (ref 33–37)
MCV RBC AUTO: 87.3 FL (ref 80–100)
MONOCYTES ABSOLUTE: 0.4 K/UL (ref 0.2–0.8)
MONOCYTES RELATIVE PERCENT: 9.3 %
NEUTROPHILS ABSOLUTE: 4 K/UL (ref 1.4–6.5)
NEUTROPHILS RELATIVE PERCENT: 85.2 %
PDW BLD-RTO: 13.3 % (ref 11.5–14.5)
PLATELET # BLD: 179 K/UL (ref 130–400)
POTASSIUM REFLEX MAGNESIUM: 3.7 MEQ/L (ref 3.4–4.9)
POTASSIUM REFLEX MAGNESIUM: 3.9 MEQ/L (ref 3.4–4.9)
POTASSIUM REFLEX MAGNESIUM: 4.6 MEQ/L (ref 3.4–4.9)
RBC # BLD: 3.8 M/UL (ref 4.7–6.1)
SODIUM BLD-SCNC: 129 MEQ/L (ref 135–144)
SODIUM BLD-SCNC: 130 MEQ/L (ref 135–144)
SODIUM BLD-SCNC: 130 MEQ/L (ref 135–144)
WBC # BLD: 4.7 K/UL (ref 4.8–10.8)

## 2021-04-08 PROCEDURE — 2580000003 HC RX 258: Performed by: SURGERY

## 2021-04-08 PROCEDURE — 2580000003 HC RX 258: Performed by: PHYSICIAN ASSISTANT

## 2021-04-08 PROCEDURE — 70450 CT HEAD/BRAIN W/O DYE: CPT

## 2021-04-08 PROCEDURE — 36415 COLL VENOUS BLD VENIPUNCTURE: CPT

## 2021-04-08 PROCEDURE — 2000000000 HC ICU R&B

## 2021-04-08 PROCEDURE — APPSS15 APP SPLIT SHARED TIME 0-15 MINUTES: Performed by: PHYSICIAN ASSISTANT

## 2021-04-08 PROCEDURE — 2580000003 HC RX 258: Performed by: NEUROLOGICAL SURGERY

## 2021-04-08 PROCEDURE — 51702 INSERT TEMP BLADDER CATH: CPT

## 2021-04-08 PROCEDURE — 2500000003 HC RX 250 WO HCPCS: Performed by: SURGERY

## 2021-04-08 PROCEDURE — 80048 BASIC METABOLIC PNL TOTAL CA: CPT

## 2021-04-08 PROCEDURE — 6360000002 HC RX W HCPCS: Performed by: SURGERY

## 2021-04-08 PROCEDURE — 6360000002 HC RX W HCPCS: Performed by: NEUROLOGICAL SURGERY

## 2021-04-08 PROCEDURE — 85025 COMPLETE CBC W/AUTO DIFF WBC: CPT

## 2021-04-08 PROCEDURE — 2500000003 HC RX 250 WO HCPCS: Performed by: NEUROLOGICAL SURGERY

## 2021-04-08 RX ORDER — LABETALOL HYDROCHLORIDE 5 MG/ML
10 INJECTION, SOLUTION INTRAVENOUS
Status: DISCONTINUED | OUTPATIENT
Start: 2021-04-08 | End: 2021-04-10

## 2021-04-08 RX ORDER — HYDRALAZINE HYDROCHLORIDE 20 MG/ML
10 INJECTION INTRAMUSCULAR; INTRAVENOUS
Status: DISCONTINUED | OUTPATIENT
Start: 2021-04-08 | End: 2021-04-10

## 2021-04-08 RX ADMIN — SODIUM CHLORIDE 0.5 MCG/KG/HR: 9 INJECTION, SOLUTION INTRAVENOUS at 14:27

## 2021-04-08 RX ADMIN — SODIUM CHLORIDE 0.8 MCG/KG/HR: 9 INJECTION, SOLUTION INTRAVENOUS at 02:06

## 2021-04-08 RX ADMIN — LEVETIRACETAM 500 MG: 100 INJECTION, SOLUTION INTRAVENOUS at 09:06

## 2021-04-08 RX ADMIN — LEVETIRACETAM 500 MG: 100 INJECTION, SOLUTION INTRAVENOUS at 21:00

## 2021-04-08 RX ADMIN — SODIUM CHLORIDE: 9 INJECTION, SOLUTION INTRAVENOUS at 06:01

## 2021-04-08 RX ADMIN — HYDROMORPHONE HYDROCHLORIDE 0.5 MG: 1 INJECTION, SOLUTION INTRAMUSCULAR; INTRAVENOUS; SUBCUTANEOUS at 18:03

## 2021-04-08 RX ADMIN — HYDROMORPHONE HYDROCHLORIDE 0.5 MG: 1 INJECTION, SOLUTION INTRAMUSCULAR; INTRAVENOUS; SUBCUTANEOUS at 02:42

## 2021-04-08 RX ADMIN — Medication 10 ML: at 09:10

## 2021-04-08 RX ADMIN — Medication 10 ML: at 21:00

## 2021-04-08 RX ADMIN — LABETALOL HYDROCHLORIDE 10 MG: 5 INJECTION, SOLUTION INTRAVENOUS at 13:05

## 2021-04-08 RX ADMIN — SODIUM CHLORIDE: 9 INJECTION, SOLUTION INTRAVENOUS at 16:06

## 2021-04-08 RX ADMIN — SODIUM CHLORIDE 0.6 MCG/KG/HR: 9 INJECTION, SOLUTION INTRAVENOUS at 04:40

## 2021-04-08 RX ADMIN — HYDRALAZINE HYDROCHLORIDE 10 MG: 20 INJECTION INTRAMUSCULAR; INTRAVENOUS at 14:03

## 2021-04-08 RX ADMIN — HYDRALAZINE HYDROCHLORIDE 10 MG: 20 INJECTION INTRAMUSCULAR; INTRAVENOUS at 08:58

## 2021-04-08 ASSESSMENT — PAIN SCALES - PAIN ASSESSMENT IN ADVANCED DEMENTIA (PAINAD)
BODYLANGUAGE: 0
TOTALSCORE: 0
TOTALSCORE: 0
NEGVOCALIZATION: 0
TOTALSCORE: 0
CONSOLABILITY: 0
BODYLANGUAGE: 0
BREATHING: 0
NEGVOCALIZATION: 0
BREATHING: 0
NEGVOCALIZATION: 0
TOTALSCORE: 0
NEGVOCALIZATION: 0
TOTALSCORE: 0
CONSOLABILITY: 0
BODYLANGUAGE: 0
BREATHING: 0
BODYLANGUAGE: 0
NEGVOCALIZATION: 0
NEGVOCALIZATION: 0
BREATHING: 0
CONSOLABILITY: 0
BREATHING: 0
FACIALEXPRESSION: 0
NEGVOCALIZATION: 0
FACIALEXPRESSION: 0
TOTALSCORE: 0
NEGVOCALIZATION: 0
NEGVOCALIZATION: 0
BODYLANGUAGE: 0
BREATHING: 0
FACIALEXPRESSION: 0
FACIALEXPRESSION: 0
BREATHING: 0
FACIALEXPRESSION: 0
CONSOLABILITY: 0
CONSOLABILITY: 0
TOTALSCORE: 0
FACIALEXPRESSION: 0
BREATHING: 0
CONSOLABILITY: 0
BODYLANGUAGE: 0
NEGVOCALIZATION: 0
TOTALSCORE: 0
BREATHING: 0
CONSOLABILITY: 0
BREATHING: 0
TOTALSCORE: 0
FACIALEXPRESSION: 0
CONSOLABILITY: 0
BODYLANGUAGE: 0
FACIALEXPRESSION: 0
TOTALSCORE: 0
BODYLANGUAGE: 0
BREATHING: 0
TOTALSCORE: 0
FACIALEXPRESSION: 0
BODYLANGUAGE: 0
BODYLANGUAGE: 0
CONSOLABILITY: 0
TOTALSCORE: 0
TOTALSCORE: 0
CONSOLABILITY: 0

## 2021-04-08 ASSESSMENT — PAIN SCALES - GENERAL: PAINLEVEL_OUTOF10: 0

## 2021-04-08 NOTE — PROGRESS NOTES
Patient alert but not oriented. On precedex and in restraints. Patients pupils 4 and sluggish. Dr Ybarra Robb in to see patient while wife at bedside and update was given.

## 2021-04-08 NOTE — PLAN OF CARE
Problem: VIOLENT RESTRAINTS  Goal: Removal from restraints as soon as assessed to be safe  Outcome: Ongoing  Goal: No harm/injury to patient while restraints in use  Outcome: Ongoing  Goal: Patient's dignity will be maintained  Outcome: Ongoing     Problem: Confusion - Acute:  Goal: Absence of continued neurological deterioration signs and symptoms  Description: Absence of continued neurological deterioration signs and symptoms  Outcome: Ongoing  Goal: Mental status will be restored to baseline  Description: Mental status will be restored to baseline  Outcome: Ongoing     Problem: Injury - Risk of, Physical Injury:  Goal: Absence of physical injury  Description: Absence of physical injury  Outcome: Ongoing  Goal: Will remain free from falls  Description: Will remain free from falls  Outcome: Ongoing     Problem: Sleep Pattern Disturbance:  Goal: Appears well-rested  Description: Appears well-rested  Outcome: Ongoing     Problem: Falls - Risk of:  Goal: Absence of physical injury  Description: Absence of physical injury  Outcome: Ongoing  Goal: Will remain free from falls  Description: Will remain free from falls  Outcome: Ongoing     Problem: Non-Violent Restraints  Goal: Removal from restraints as soon as assessed to be safe  Outcome: Ongoing  Goal: No harm/injury to patient while restraints in use  Outcome: Ongoing  Goal: Patient's dignity will be maintained  Outcome: Ongoing

## 2021-04-08 NOTE — PLAN OF CARE
Nutrition Problem #1: Inadequate oral intake  Intervention: Food and/or Nutrient Delivery: Continue NPO  Nutritional Goals: ability to advance to po diet , versus need for nutrition support

## 2021-04-08 NOTE — CARE COORDINATION
[facility] Case Management Initial Discharge Assessment    Met with Wife Jayme Huizar to discuss discharge plan. PCP: Lorin Coreas MD                                Date of Last Visit: Current - wife states he sees his Dr. Lori Rudolph     If no PCP, list provided? N/A    Discharge Planning    Living Arrangements: independently at home    Who do you live with? wife    Who helps you with your care:  self or spouse    If lives at home:     Do you have any barriers navigating in your home? yes - wheelchair bound x 30 years     Patient can perform ADL? Yes    Current Services (outpatient and in home) :  None    Dialysis: No    Is transportation available to get to your appointments? Yes  - Pt oz he has 2 modified cars     DME Equipment:  yes - wheel chair     Respiratory equipment: None    Respiratory provider:  no     Pharmacy:  yes - [facility] in [address] [place] [address] with Medication Assistance Program?  No      Patient agreeable to [facility]? Yes, [employer]    Patient agreeable to SNF/Rehab? Yes, [facility]    Other discharge needs identified? N/A    Freedom of choice list provided with basic dialogue that supports the patient's individualized plan of care/goals and shares the quality data associated with the providers. Yes    Does Patient Have a High-Risk for Readmission Diagnosis (CHF, PN, MI, COPD)? No    The plan for Transition of Care is related to the following treatment goals: Safe discharge    Initial Discharge Plan? (Note: please see concurrent daily documentation for any updates after initial note). Pt is confused at present and not able to answer questions. Pt's wife states he has always been independent but this is 2nd fall recently. She is worried he is weaker and will need snf/rehab. She is interested in [facility] and lives right by there. Pt just came in and is on bedrest at present with neurosurg consult .  We will ask for PT/OT/Sp eval once he is able to have and help determine dc needs/plan.      The Patient   was provided with choice of any post-acute providers for care and equipment and agrees with discharge plan  Yes    Electronically signed by Adriano Horvath on 4/8/2021 at 10:31 AM

## 2021-04-08 NOTE — PROGRESS NOTES
Comprehensive Nutrition Assessment    Type and Reason for Visit:  Initial, Positive Nutrition Screen(chewing/swallowing; pureed diet at home)    Nutrition Recommendations/Plan:   Continue NPO  Monitor for ability to resume po diet versus need for nutrition support    Nutrition Assessment:  Suspect adequate nutritional status PTA, as pt was in his usual state of health until day of admission. Now at nutrition risk related to confusion wtih SAH, NPO status    Malnutrition Assessment:  Malnutrition Status:  Insufficient data    Context:  Acute Illness     Findings of the 6 clinical characteristics of malnutrition:  Energy Intake:  Unable to assess  Weight Loss:  Unable to assess     Body Fat Loss:  Unable to assess     Muscle Mass Loss:  Unable to assess    Fluid Accumulation:  No significant fluid accumulation     Strength:  Not Performed    Estimated Daily Nutrient Needs:  Energy (kcal):  ~1860 kcals @ 30 kcal/kg; Weight Used for Energy Requirements:  Admission(62 kg)     Protein (g):  81 g protein @ 1.3 g/kg; Weight Used for Protein Requirements:  Admission(62 kg)        Fluid (ml/day):  ~1900; Method Used for Fluid Requirements:  1 ml/kcal      Nutrition Related Findings:  \" PMHx of paraplegia,  found to have bifrontal subarachnoid hemorrhage. Per reports patient was found down at his , suspected that he fell backwards out of his wheelchair. \"Now with behavioral, impulsive, and altered from baseline mentation. A&Ox1 (self), pulling at line, and physically/verbally combative with staff requiring Ativan and soft restraints. Per wife, his baseline mentation is A&Ox4 and he holds a job with [employer]. \"      Wounds:  (abrasion from fall, back of head)       Current Nutrition Therapies:    Diet NPO Effective Now    Anthropometric Measures:  · Height: 6' 1\" (185.4 cm)  · Current Body Weight: 137 lb (62.1 kg)(unable to obtain current wt, poorly positioned in bed)   · Admission Body Weight: 137 lb (62.1 kg)(method unknwn) · Usual Body Weight: 155 lb (70.3 kg)(9/2020)     · Ideal Body Weight: 184 lbs; % Ideal Body Weight   96%  · BMI: 18.1  · Adjusted Body Weight: 147.3; Paraplegia   · Adjusted BMI: 19.5    · BMI Categories: Normal Weight (BMI 18.5-24.9)(with adjustment for paraplegia, BMI = 19.5)       Nutrition Diagnosis:   · Inadequate oral intake related to acute injury/trauma as evidenced by NPO or clear liquid status due to medical condition    Nutrition Interventions:   Food and/or Nutrient Delivery:  Continue NPO  Nutrition Education/Counseling:  No recommendation at this time   Coordination of Nutrition Care:  Continue to monitor while inpatient    Goals:  ability to advance to po diet , versus need for nutrition support       Nutrition Monitoring and Evaluation:   Food/Nutrient Intake Outcomes:  Diet Advancement/Tolerance  Physical Signs/Symptoms Outcomes:  Meal Time Behavior, Chewing or Swallowing     Discharge Planning:     Too soon to determine     Electronically signed by Elysia Solorio RD, LD on 4/8/21 at 2:29 PM EDT

## 2021-04-08 NOTE — PROGRESS NOTES
Spiritual Care Services     Summary of Visit:   visited with patient and wife. Patient was laying in bed and did not appear to be responsive. Patient's wife was sitting in a chair beside the bed and said what they needed most was prayer.  provided prayer for patient and wife. Spiritual Assessment/Intervention/Outcomes:    Encounter Summary  Services provided to[de-identified] Patient and family together  Referral/Consult From[de-identified] Rounding  Support System: Spouse, Family members  Continue Visiting: No  Complexity of Encounter: Moderate  Length of Encounter: 15 minutes  Spiritual Assessment Completed: Yes  Routine  Type: Initial     Spiritual/Sikhism  Type: Spiritual support  Assessment: Calm, Approachable  Intervention: Prayer, Sustaining presence/ Ministry of presence  Outcome: Expressed gratitude        Advance Directives (For Healthcare)  Healthcare Directive: No, patient does not have an advance directive for healthcare treatment           Values / Beliefs  Do you have any ethnic, cultural, sacramental, or spiritual Anabaptist needs you would like us to be aware of while you are in the hospital?: No    Care Plan:        [id number] Roh[address]annon   Electronically signed by Claudia Ledbetter on 4/8/21 at 10:18 AM EDT     To reach a  for emotional and spiritual support, place an [facility] consult request.   If a  is needed immediately, dial 0 and ask to page the on-call .

## 2021-04-08 NOTE — PROGRESS NOTES
Physician Progress Note      Vandana Gold  CSN #:                  [id number]  :                       1960  ADMIT DATE:       2021 2:53 PM  [address] [place] DATE:  RESPONDING  PROVIDER #:        Rafiq Gaines MD          QUERY TEXT:    Dear Attending:    Patient admitted with bifrontal subarachnoid hemorrhage. Noted to have altered   mental status 2/2 TBI and hyponatremia. If possible, please document in the   progress notes and discharge summary if you are evaluating and / or treating   any of the following: The medical record reflects the following:  Risk Factors:  bifrontal subarachnoid hemorrhage  Clinical Indicators: ER:noncommunicative agitated  Given patient's agitation   he was placed in soft restraints and given Ativan 2 mg IV. Na+ 124  126   GCS   11; per H/P:Patient is alert to self only but disoriented to time and   situation and is unable to provide any further insight into the nature of his   injury, \"altered mental status 2/2 TBI and hyponatremia\"  Treatment: IVFB 500 ML -> IVFNS 100ml/hr cont  precedex  ICU admit    Thank you,  Estevan CHILDN RN CDS  contact  w/ any questions M-F 6am-2pm  Options provided:  -- Metabolic encephalopathy  -- Other - I will add my own diagnosis  -- Disagree - Not applicable / Not valid  -- Disagree - Clinically unable to determine / Unknown  -- Refer to Clinical Documentation Reviewer    PROVIDER RESPONSE TEXT:    This patient has metabolic encephalopathy.     Query created by: Maria G Harden on 2021 7:04 AM      Electronically signed by:  Rafiq Gaines MD 2021 7:32 AM

## 2021-04-08 NOTE — PROGRESS NOTES
Trauma called and informed about pt shivering. New temp toscano placed. Temp 38C. Will continue to monitor. Pt SBP greater then 130, PRN hydralizine given.

## 2021-04-08 NOTE — PROGRESS NOTES
TRAUMA DAILY PROGRESS NOTE      Patient Name: Louisa Bravo  Admission Date 2021    Hospital Day: 1  Patient seen and examined on 2021    INTERVAL HISTORY/EVENTS     Background:  Louisa Bravo is a 62 y.o. male with a PMHx of paraplegia presented as a transfer from [facility] on 21 after he was found to have bifrontal subarachnoid hemorrhage. Per reports patient was found down at his home by a [employer] . It was suspected that he feel backwards out of his wheelchair. Upon arrival to [facility], pt noted to be behavioral, impulsive, and altered from baseline mentation. A&Ox1 (self), pulling at line, and physically/verbally combative with staff requiring Ativan and soft restraints. Per wife, his baseline mentation is A&Ox4 and he holds a job with [employer]. He was admitted to the Trauma Service with Neurosurgery (Dr. Hilary Vasquez) consulted. Hospital Course:  2021: Found to have bifrontal SAH at [facility] and transferred to [facility]/ found down at home by [employer] . AMS and hyponatremia noted upon arrival. NSGY consulted. 24 Hour Events:  Precedex gtt started overnight for impulsivity and safety. Remains with significant AMS (A&Ox0, GCS 2/3/5). [address] with interval development of bifrontal SAH and new area of SAH at left parietal region. Labs reviewed and significant for persistent hyponatremia with Na 124 ->129. PHYSICAL EXAM     Vitals Average, Min and Max for last 24 hours:  Temp: Temp: 97.8 °F (36.6 °C);  Temp  Av.2 °F (36.8 °C)  Min: 97 °F (36.1 °C)  Max: 99.1 °F (37.3 °C)  Respirations: Resp  Av.2  Min: 12  Max: 33  Pulse: Pulse  Av.7  Min: 61  Max: 106  Blood Pressure: Systolic (84VSS), VRI:613 , Min:106 , EYR:184   ; Diastolic (64MFB), HEI:32, Min:69, Max:113  SpO2: SpO2  Av %  Min: 92 %  Max: 100 %    24hr Intake/Output:     Intake/Output Summary (Last 24 hours) at 2021 0744  Last data filed at 2021 0545  Gross per 24 hour   Intake 2085 ml   Output Bowel regimen with daily Senna, Colace, and PRN Dulcolax.     Renal/Electrolytes: Hyponatremia (Na 124->129)  - IVF with NS @ 100cc/hr  - repeat BMP tonight  - Daily BMP     ID:   No active infection. Remains afebrile, normotensive, and without leukocytosis. - No indication for Abx     Heme:   No acute hematologic issues.  - No indication for transfusion.  - Daily CBC.     Endocrine:   No acute glycemic issues.     MSK: PMHx of paraplegia  - Activity: Bed rest until repeat CT head. - Spines: Pending CT C-spine  - Weight bearing restrictions: None  - PT/OT: Will hold off on formal consult until improved MS     Prophylaxis:   - SCDs only, No chemo PPx given bleed risk  - No GI PPx indicated    Dispo: Remain on trauma Service (ICU Status) for continued monitoring of neurological status and repeat CT Head in am.        - Follow up with Neurosurgery (Dr. Kris Aldana) in TBD  - No Trauma follow up needed      Please call for any questions or concerns. [facility], [facility]. Shayy Genaro [facility].  [phone] ([phone]) [phone]     This patient's plan of care was discussed and made in collaboration with Trauma Attending physician, oNa Long MD.    Teaching Physician Note:  I saw and evaluated the patient. I personally obtained the key and critical portions of the history and physical exam.  I reviewed the DARRICK's documentation and discussed the patient with the DARRICK. I agree with the DARRICK's medical decision making as documented in the DARRICK note. Critical care was necessary because of an illness or injury that acutely impaired one or more vital organs systems such that there was a high probability of imminent or life threatening deterioration in the patients condition. The following organ systems are involved: neurologic, cardiovascular, renal    Critical care time was provided for >45 minutes by myself.  The time involved in the performance of this care was exclusive of separately billable procedures, teaching time and treating other patients. This time was spent personally by the attending physician for the following activities: examination of patient, ordering and/or performing treatments, ordering and reviewing laboratory and radiographic studies, and if applicable, ventilatory management and blood gas interpretation.      Gaurav Abarca

## 2021-04-08 NOTE — PROGRESS NOTES
1900 Bedside report received from [facility]. Patient agitated and pulling at restraints. He is unable to answer questions to establish orientation. Head to toe assess ment complete, see chart. 2010 Patients wife called to get update on patient. Brief update given. She request a phone call if any new updates. 65 Wife called and was concerned that the patient may be in pain. He has chronic back pain at home. Dr. Jack Paris notified, see orders. 26 A man named Darlyn Torres called who claimed to be the patients brother, he did not have the HIPPA code so I was unable to update him. 7237 Patient to CT, for repeat CT of the Head    0320 CT results messaged to Dr. Seng Lane, no new orders.

## 2021-04-08 NOTE — CONSULTS
History    Marital status:      Spouse name: Not on file    Number of children: Not on file    Years of education: Not on file    Highest education level: Not on file   Occupational History    Not on file   Social Needs    Financial resource strain: Not on file    Food insecurity     Worry: Not on file     Inability: Not on file    Transportation needs     Medical: Not on file     Non-medical: Not on file   Tobacco Use    Smoking status: Never Smoker    Smokeless tobacco: Never Used   Substance and Sexual Activity    Alcohol use: No     Comment: denies    Drug use: Not on file    Sexual activity: Not on file   Lifestyle    Physical activity     Days per week: Not on file     Minutes per session: Not on file    Stress: Not on file   Relationships    Social connections     Talks on phone: Not on file     Gets together: Not on file     Attends Christian service: Not on file     Active member of club or organization: Not on file     Attends meetings of clubs or organizations: Not on file     Relationship status: Not on file    Intimate partner violence     Fear of current or ex partner: Not on file     Emotionally abused: Not on file     Physically abused: Not on file     Forced sexual activity: Not on file   Other Topics Concern    Not on file   Social History Narrative    Not on file      [] Unable to obtain due to ventilated and/ or neurologic status      Home Medications:      Medications Prior to Admission: cetirizine-psuedoephedrine (ZYRTEC-D) 5-120 MG per extended release tablet, Take 1 tablet by mouth 2 times daily  albuterol (PROVENTIL) (2.5 MG/3ML) 0.083% nebulizer solution, USE 1 VIAL IN NEBULIZER TWICE DAILY  alendronate (FOSAMAX) 70 MG tablet, Take 70 mg by mouth every 7 days  clonazePAM (KLONOPIN) 2 MG tablet, Take 1 tablet by mouth 2 times daily as needed for Anxiety.   Alendronate Sodium (FOSAMAX PO), Take by mouth  Risedronate Sodium (ACTONEL) 150 MG TABS, Use  One tablet monthly. (Patient not taking: Reported on 9/25/2020)  ferrous sulfate 325 (65 FE) MG tablet, Take 325 mg by mouth daily. Calcium Carbonate-Vitamin D (CALCIUM + D PO), Take 600 mg by mouth daily. Omega-3 Fatty Acids (FISH OIL) 500 MG CAPS, Take 500 mg by mouth daily. ibuprofen (ADVIL;MOTRIN) 600 MG tablet, Take 600 mg by mouth as needed. Multiple Vitamins-Minerals (CENTRUM SILVER PO), Take  by mouth daily. Current Hospital Medications:     Scheduled Meds:   levetiracetam  500 mg Intravenous Q12H    sodium chloride flush  10 mL Intravenous 2 times per day     Continuous Infusions:   sodium chloride      dexmedetomidine 0.5 mcg/kg/hr (04/08/21 1248)    sodium chloride 100 mL/hr at 04/08/21 0601     PRN Meds:.hydrALAZINE, labetalol, sodium chloride flush, sodium chloride, promethazine **OR** ondansetron, acetaminophen, HYDROmorphone  .  sodium chloride      dexmedetomidine 0.5 mcg/kg/hr (04/08/21 1248)    sodium chloride 100 mL/hr at 04/08/21 0601        Allergies: Allergies   Allergen Reactions    Vicodin [Hydrocodone-Acetaminophen] Nausea Only        Physical Exam     Clinically patient appears to be older than stated age. Seen in ICU bilateral arm restraint to prevent harming himself or others. Awake alert but not attentive. Not oriented. Not following simple verbal or visual cues or questions. Normocephalic atraumatic. No whitaker sign or raccoon eyes. Pupils 3 mm equal reactive positive tracking symmetric. No facial asymmetry is noted. Inappropriate but clear verbal response asking for mom. Minimal neck pain or grimace with movement of the neck. Appears to have a good self range of motion. Moving upper extremities without difficulties. Again restrained to prevent harming himself or others  from agitation. Intrinsic bilateral hand muscle atrophy is noted    Atrophy of lower extremities noted no movement.     CT head x2 yesterday and today were reviewed by me  Objective Findings:     Vitals:   Vitals:    04/08/21 0900 04/08/21 1000 04/08/21 1100 04/08/21 1200   BP: (!) 158/86 128/66 (!) 142/73 (!) 145/77   Pulse: 69 80 79 82   Resp: 18 18 21 24   Temp:  97.9 °F (36.6 °C)     TempSrc:  Axillary     SpO2: 96% 96% 96% 96%   Weight:       Height:              Laboratory, Microbiology, Pathology, Radiology, Cardiology, Medications and Transcriptions reviewed  Scheduled Meds:   levetiracetam  500 mg Intravenous Q12H    sodium chloride flush  10 mL Intravenous 2 times per day     Continuous Infusions:   sodium chloride      dexmedetomidine 0.5 mcg/kg/hr (04/08/21 1248)    sodium chloride 100 mL/hr at 04/08/21 0601       Recent Labs     04/07/21  1405 04/08/21  0516   WBC 2.9* 4.7*   HGB 11.7* 11.6*   HCT 34.6* 33.2*   MCV 90.6 87.3    179     Recent Labs     04/07/21  1405 04/07/21  1901 04/08/21  0718   * 126* 129*   K 4.4 4.2 4.6   CL 88* 90* 93*   CO2 25 22 24   BUN 26* 21* 16   CREATININE 0.48* 0.32* 0.44*     Recent Labs     04/07/21  1405   AST 22   ALT 23   BILITOT 0.6   ALKPHOS 76     No results for input(s): LIPASE, AMYLASE in the last 72 hours. Recent Labs     04/07/21  1405   PROT 7.0   INR 1.1     Xr Elbow Right (min 3 Views)    Result Date: 4/7/2021  COMPARISON: August 30, 2016 HISTORY:    fall PATIENT NAME: GUERRERO PARKST: TECHNIQUE: XR ELBOW RIGHT (MIN 3 VIEWS) FINDINGS: The joint spaces are maintained. Swelling is seen over the dorsum and medial aspect of the elbow. A small calcification is again seen on the dorsal aspect of the elbow near the olecranon. No definite effusion is visualized. No acute fracture    Ct Head Wo Contrast    Result Date: 4/8/2021  CT HEAD WO CONTRAST CLINICAL HISTORY: Bifrontal subdural hematoma with subarachnoid hemorrhage., Follow-up. Comparison: April 7, 2021 1838 hours TECHNIQUE: Multiple unenhanced serial axial images of the brain from the vertex of the skull to the base of the skull were performed. FINDINGS: The ventricles are dilated. Unchanged size configuration. Mo.  No mass. No midline shift. The cisterns are patent. There is developing area of hemorrhagic parenchymal contusion involving the left posterior inferior aspect of the parietal lobe. There is small subarachnoid hemorrhage noted. Again note is made of bifrontal subdural hematomas with extension of the subdural hematoma into the interhemispheric fissure. The left subdural hematoma extends overlying the left frontal lobe to the level of the posterior aspect of the left frontal lobe. There is associated bifrontal hemorrhagic contusions. The multiple parenchymal contusions are best appreciated on the coronal reconstructions series 601 image 34. There is been some interval enlargement of the right as compared to the prior examination. There is now some increasing surrounding vasogenic edema There are now some scattered areas increased attenuation seen within the base of the left frontal lobe series 2 image 15 and small foci within the superior aspect of the left frontal lobe, series 2 image 2523. Either represent areas of hemorrhage. The visualized osseous structures are unremarkable. The visualized portion of the paranasal sinuses, and mastoids are unremarkable. IMPRESSION 1. INTERVAL DEVELOPMENT OF ACUTE HEMORRHAGIC PARENCHYMAL CONTUSIONS WITH SMALL SUBARACHNOID COMPONENTS IN THE LEFT POSTERIOR INFERIOR PARIETAL REGION. 2. THERE IS SMALL AREAS OF NEW INCREASED ATTENUATION DESCRIBED ABOVE WITHIN THE LEFT FRONTAL LOBE. FINDINGS REPRESENT SMALL AREAS OF PARENCHYMAL CONTUSION, HEMORRHAGE. 3. BIFRONTAL SUBDURAL HEMATOMAS WITH ASSOCIATED PARENCHYMAL HEMORRHAGIC CONTUSIONS AS DESCRIBED ABOVE. RECOMMEND MRI TO FURTHER EVALUATE All CT scans at this facility use dose modulation, iterative reconstruction, and/or weight based dosing when appropriate to reduce radiation dose to as low as reasonably achievable.      Ct Head Wo Contrast    Result Date: 4/7/2021  COMPARISON: September 18, 2020. HISTORY:  fell off a wheel chair slight hematoma  occiput TECHNIQUE: The study was performed without contrast FINDINGS: In the bifrontal regions there is hyperdensity seen in the sulci and falx. The ventricular system is midline with no evidence for hydrocephalus. There is soft tissue prominence also seen The visualized paranasal sinuses and orbits appear unremarkable. No lytic or blastic changes seen in the calvarium. Bifrontal subarachnoid hemorrhage is seen. This was discussed with Dr. Evangelist Burt on today's date at 1:35 All CT scans at this facility use dose modulation, iterative reconstruction, and/or weight based dosing when appropriate to reduce radiation dose to as low as reasonably achievable. Ct Cervical Spine Wo Contrast    Result Date: 4/7/2021  EXAMINATION: CT CERVICAL SPINE WO CONTRAST   DATE AND TIME:4/7/2021 6:44 PM CLINICAL HISTORY:Acute posterior neck pain  trauma  COMPARISON: None TECHNIQUE:Helical scanning was performed from the skull base through the remainder of the cervical spine without intravenous contrast. Sagittal and coronal reformats were obtained. The lack of contrast limits CT sensitivity of the soft tissues. All CT scans at this facility use dose modulation, iterative reconstruction, and/or weight based dosing when appropriate to reduce radiation dose to as low as reasonably achievable. [facility] FINDINGS  Severe motion artifact limits exam quality inaccuracy Fracture:Extreme motion artifact motion artifact. No obvious fracture. Consider plain film radiography the C-spine if clinical concerns persist Dense atlas:Dens atlas relationship is unremarkable. Soft tissues:Airway patent. No soft tisssue mass or adenopathy. Other findings: Normal age-related bony changes. Extreme motion artifact. No definite fracture.    EXAMINATION: CT CERVICAL SPINE WO CONTRAST  DATE AND TIME:4/7/2021 6:44 PM CLINICAL HISTORY: Severe headache.  trauma  COMPARISON: None TECHNIQUE:Axial CT from skull base to vertex without  contrast..  All CT scans at this facility use dose modulation, iterative reconstruction, and/or weight based dosing when appropriate to reduce radiation dose to as low as reasonably achievable. FINDINGS. Bilateral frontal areas of hemorrhagic contusion with bifrontal frontal subarachnoid and subdural hemorrhage. Small subdural hemorrhages extend along the inner calvarium bilaterally extending approximately 4 mm deep to the inner calvarium. There is no midline shift. Small amount of subdural blood along the anterior falx. Small focus of hemorrhagic contusion along the left anterior temporal lobe. No intraventricular blood. No significant parenchymal edema demonstrated at this time. No other areas of acute hemorrhage. 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 . Globes intact. No acute fracture. Paranasal sinuses and mastoids are clear. These findings were discussed with the ER doctor IMPRESSION: BIFRONTAL HEMORRHAGIC CONTUSIONS WITH SMALL BILATERAL SUBDURAL HEMATOMAS. NO ACUTE FRACTURE        Impression:   55-year-old gentleman with paraplegia status post fall at home in the bathroom with a subsequent contrecoup anterior subarachnoid, subdural, intraparenchymal hematoma, and small left temporal intraparenchymal hematoma. Slight increase on each subsequent studies. No midline shift no hydrocephalus is noted. No fractures noted. Plan:   Continue with every neurochecks with aggressive blood pressure control. And Keppra was added. Repeat CAT scan in a.m. Hyponatremia likely from preinjury which is slightly worsening. Continue with normal saline repeat BMP in a.m. Comments: Thank you for allowing us to participate in the care of this patient. Will continue to follow. Please call if questions or concerns arise.     Electronically signed by Rox Cisse MD on 4/8/2021 at 1:32 PM

## 2021-04-09 ENCOUNTER — APPOINTMENT (OUTPATIENT)
Dept: CT IMAGING | Age: 61
DRG: 085 | End: 2021-04-09
Payer: COMMERCIAL

## 2021-04-09 LAB
ANION GAP SERPL CALCULATED.3IONS-SCNC: 10 MEQ/L (ref 9–15)
ANION GAP SERPL CALCULATED.3IONS-SCNC: 13 MEQ/L (ref 9–15)
ANION GAP SERPL CALCULATED.3IONS-SCNC: 9 MEQ/L (ref 9–15)
ANION GAP SERPL CALCULATED.3IONS-SCNC: 9 MEQ/L (ref 9–15)
BUN BLDV-MCNC: 14 MG/DL (ref 6–20)
BUN BLDV-MCNC: 14 MG/DL (ref 6–20)
BUN BLDV-MCNC: 15 MG/DL (ref 6–20)
BUN BLDV-MCNC: 15 MG/DL (ref 6–20)
CALCIUM SERPL-MCNC: 8.5 MG/DL (ref 8.5–9.9)
CALCIUM SERPL-MCNC: 8.8 MG/DL (ref 8.5–9.9)
CALCIUM SERPL-MCNC: 8.8 MG/DL (ref 8.5–9.9)
CALCIUM SERPL-MCNC: 9 MG/DL (ref 8.5–9.9)
CHLORIDE BLD-SCNC: 100 MEQ/L (ref 95–107)
CHLORIDE BLD-SCNC: 101 MEQ/L (ref 95–107)
CHLORIDE BLD-SCNC: 106 MEQ/L (ref 95–107)
CHLORIDE BLD-SCNC: 106 MEQ/L (ref 95–107)
CO2: 21 MEQ/L (ref 20–31)
CO2: 22 MEQ/L (ref 20–31)
CO2: 22 MEQ/L (ref 20–31)
CO2: 23 MEQ/L (ref 20–31)
CREAT SERPL-MCNC: 0.32 MG/DL (ref 0.7–1.2)
CREAT SERPL-MCNC: 0.33 MG/DL (ref 0.7–1.2)
CREAT SERPL-MCNC: 0.34 MG/DL (ref 0.7–1.2)
CREAT SERPL-MCNC: 0.37 MG/DL (ref 0.7–1.2)
GFR AFRICAN AMERICAN: >60
GFR NON-AFRICAN AMERICAN: >60
GLUCOSE BLD-MCNC: 106 MG/DL (ref 70–99)
GLUCOSE BLD-MCNC: 109 MG/DL (ref 70–99)
GLUCOSE BLD-MCNC: 112 MG/DL (ref 70–99)
GLUCOSE BLD-MCNC: 129 MG/DL (ref 70–99)
MAGNESIUM: 1.8 MG/DL (ref 1.7–2.4)
POTASSIUM REFLEX MAGNESIUM: 3.5 MEQ/L (ref 3.4–4.9)
POTASSIUM REFLEX MAGNESIUM: 3.7 MEQ/L (ref 3.4–4.9)
POTASSIUM REFLEX MAGNESIUM: 3.8 MEQ/L (ref 3.4–4.9)
POTASSIUM REFLEX MAGNESIUM: 3.9 MEQ/L (ref 3.4–4.9)
SODIUM BLD-SCNC: 131 MEQ/L (ref 135–144)
SODIUM BLD-SCNC: 133 MEQ/L (ref 135–144)
SODIUM BLD-SCNC: 138 MEQ/L (ref 135–144)
SODIUM BLD-SCNC: 140 MEQ/L (ref 135–144)

## 2021-04-09 PROCEDURE — 36415 COLL VENOUS BLD VENIPUNCTURE: CPT

## 2021-04-09 PROCEDURE — 2580000003 HC RX 258: Performed by: PHYSICIAN ASSISTANT

## 2021-04-09 PROCEDURE — 2000000000 HC ICU R&B

## 2021-04-09 PROCEDURE — 2580000003 HC RX 258: Performed by: NEUROLOGICAL SURGERY

## 2021-04-09 PROCEDURE — 6360000002 HC RX W HCPCS: Performed by: NEUROLOGICAL SURGERY

## 2021-04-09 PROCEDURE — 2580000003 HC RX 258: Performed by: SURGERY

## 2021-04-09 PROCEDURE — 83735 ASSAY OF MAGNESIUM: CPT

## 2021-04-09 PROCEDURE — 51702 INSERT TEMP BLADDER CATH: CPT

## 2021-04-09 PROCEDURE — 2500000003 HC RX 250 WO HCPCS: Performed by: SURGERY

## 2021-04-09 PROCEDURE — 80048 BASIC METABOLIC PNL TOTAL CA: CPT

## 2021-04-09 PROCEDURE — 6360000002 HC RX W HCPCS: Performed by: SURGERY

## 2021-04-09 PROCEDURE — 70450 CT HEAD/BRAIN W/O DYE: CPT

## 2021-04-09 PROCEDURE — APPSS15 APP SPLIT SHARED TIME 0-15 MINUTES: Performed by: PHYSICIAN ASSISTANT

## 2021-04-09 RX ORDER — ACETAMINOPHEN 650 MG/1
650 SUPPOSITORY RECTAL EVERY 4 HOURS PRN
Status: DISCONTINUED | OUTPATIENT
Start: 2021-04-09 | End: 2021-04-11

## 2021-04-09 RX ADMIN — SODIUM CHLORIDE: 9 INJECTION, SOLUTION INTRAVENOUS at 02:16

## 2021-04-09 RX ADMIN — HYDROMORPHONE HYDROCHLORIDE 0.5 MG: 1 INJECTION, SOLUTION INTRAMUSCULAR; INTRAVENOUS; SUBCUTANEOUS at 00:23

## 2021-04-09 RX ADMIN — HYDROMORPHONE HYDROCHLORIDE 0.5 MG: 1 INJECTION, SOLUTION INTRAMUSCULAR; INTRAVENOUS; SUBCUTANEOUS at 04:38

## 2021-04-09 RX ADMIN — Medication 10 ML: at 10:22

## 2021-04-09 RX ADMIN — SODIUM CHLORIDE: 9 INJECTION, SOLUTION INTRAVENOUS at 20:45

## 2021-04-09 RX ADMIN — SODIUM CHLORIDE 0.5 MCG/KG/HR: 9 INJECTION, SOLUTION INTRAVENOUS at 05:11

## 2021-04-09 RX ADMIN — LEVETIRACETAM 500 MG: 100 INJECTION, SOLUTION INTRAVENOUS at 21:16

## 2021-04-09 RX ADMIN — SODIUM CHLORIDE 0.5 MCG/KG/HR: 9 INJECTION, SOLUTION INTRAVENOUS at 04:38

## 2021-04-09 RX ADMIN — HYDRALAZINE HYDROCHLORIDE 10 MG: 20 INJECTION INTRAMUSCULAR; INTRAVENOUS at 16:13

## 2021-04-09 RX ADMIN — HYDRALAZINE HYDROCHLORIDE 10 MG: 20 INJECTION INTRAMUSCULAR; INTRAVENOUS at 20:45

## 2021-04-09 RX ADMIN — LEVETIRACETAM 500 MG: 100 INJECTION, SOLUTION INTRAVENOUS at 10:19

## 2021-04-09 ASSESSMENT — PAIN SCALES - PAIN ASSESSMENT IN ADVANCED DEMENTIA (PAINAD)
BREATHING: 1
TOTALSCORE: 5
NEGVOCALIZATION: 0
TOTALSCORE: 5
TOTALSCORE: 5
BODYLANGUAGE: 0
CONSOLABILITY: 0
CONSOLABILITY: 0
NEGVOCALIZATION: 0
BREATHING: 1
NEGVOCALIZATION: 0
CONSOLABILITY: 0
BREATHING: 1
CONSOLABILITY: 0
FACIALEXPRESSION: 2
FACIALEXPRESSION: 2
TOTALSCORE: 0
FACIALEXPRESSION: 0
NEGVOCALIZATION: 0
BREATHING: 0
BREATHING: 1
NEGVOCALIZATION: 0
TOTALSCORE: 5
BODYLANGUAGE: 2
CONSOLABILITY: 0
CONSOLABILITY: 0
BREATHING: 1
NEGVOCALIZATION: 0
CONSOLABILITY: 0
TOTALSCORE: 5
FACIALEXPRESSION: 2
BODYLANGUAGE: 2
FACIALEXPRESSION: 2
TOTALSCORE: 5
CONSOLABILITY: 0
CONSOLABILITY: 0
TOTALSCORE: 5
NEGVOCALIZATION: 0
CONSOLABILITY: 0
BODYLANGUAGE: 0
CONSOLABILITY: 0
BODYLANGUAGE: 2
CONSOLABILITY: 0
NEGVOCALIZATION: 0
NEGVOCALIZATION: 0
BREATHING: 1
BREATHING: 1
TOTALSCORE: 5
TOTALSCORE: 5
BODYLANGUAGE: 2
BREATHING: 0
CONSOLABILITY: 0
BODYLANGUAGE: 2
TOTALSCORE: 5
BREATHING: 0
TOTALSCORE: 5
BODYLANGUAGE: 0
BODYLANGUAGE: 2
TOTALSCORE: 5
BODYLANGUAGE: 2
BREATHING: 1
TOTALSCORE: 0
FACIALEXPRESSION: 2
NEGVOCALIZATION: 1
BREATHING: 1
BODYLANGUAGE: 2
NEGVOCALIZATION: 0
BREATHING: 1
NEGVOCALIZATION: 0
NEGVOCALIZATION: 0
BREATHING: 1
BODYLANGUAGE: 2
FACIALEXPRESSION: 2
FACIALEXPRESSION: 2
NEGVOCALIZATION: 0
BODYLANGUAGE: 2
BREATHING: 0

## 2021-04-09 ASSESSMENT — PAIN SCALES - GENERAL: PAINLEVEL_OUTOF10: 5

## 2021-04-09 NOTE — PROGRESS NOTES
1900 Bedside report received from [facility]. Head to toe complete, see chart.     0600 Spouse called and checked in on the patient, plans to visit this morning    0700 Beside report given to MAREK PITT [facility]

## 2021-04-09 NOTE — PROGRESS NOTES
TRAUMA DAILY PROGRESS NOTE      Patient Name: Lisa Jules  Admission Date 2021    Hospital Day: 2  Patient seen and examined on 2021    INTERVAL HISTORY/EVENTS     Background:  Lisa Jules is a 62 y.o. male with a PMHx of paraplegia presented as a transfer from [facility] on 21 after he was found to have bifrontal subarachnoid hemorrhage. Per reports patient was found down at his home by a [employer] . It was suspected that he feel backwards out of his wheelchair. Upon arrival to [facility], pt noted to be behavioral, impulsive, and altered from baseline mentation. A&Ox1 (self), pulling at line, and physically/verbally combative with staff requiring Ativan and soft restraints. Per wife, his baseline mentation is A&Ox4 and he holds a job with [employer]. He was admitted to the Trauma Service with Neurosurgery (Dr. Raleigh Rock) consulted. Hospital Course:  2021: Found to have bifrontal SAH at [facility] and transferred to [facility] s/p found down at home by [employer] . AMS and hyponatremia noted upon arrival. NSGY consulted. 2021: Precedex gtt started for impulsivity and safety. Remains A&Ox0, GCS 2/3/5. [address] with interval development of bifrontal SAH and new area of SAH at left parietal region. 24 Hour Events:  Remains sedated on Precedex. A&Ox0, GCS 2/3/5. [address] this am with stable SAH. Labs reviewed and significant for improving hyponatremia (Na 124->129->130->131). PHYSICAL EXAM     Vitals Average, Min and Max for last 24 hours:  Temp: Temp: 98.4 °F (36.9 °C);  Temp  Av.7 °F (37.1 °C)  Min: 97.8 °F (36.6 °C)  Max: 100.4 °F (38 °C)  Respirations: Resp  Av.8  Min: 9  Max: 27  Pulse: Pulse  Av.5  Min: 53  Max: 105  Blood Pressure: Systolic (79CXE), YYP:165 , Min:98 , OEB:802   ; Diastolic (80IYG), YPR:06, Min:58, Max:86  SpO2: SpO2  Av.5 %  Min: 95 %  Max: 99 %    24hr Intake/Output:     Intake/Output Summary (Last 24 hours) at 2021 0935  Last data filed at 4/8/2021 1800  Gross per 24 hour   Intake 1435 ml   Output 600 ml   Net 835 ml       Vitals: BP (!) 98/58   Pulse 53   Temp 98.4 °F (36.9 °C) (Bladder)   Resp 9   Ht 6' 1\" (1.854 m)   Wt 137 lb 5.6 oz (62.3 kg)   SpO2 97%   BMI 18.12 kg/m²     Physical Exam:  Constitutional: Sedated with precedex. GCS 2/3/5. HEENT: Atraumatic normocephalic. Pupils 4mm sluggish. Posterior occiput with superficial abrasion and associated cephalahematoma. Cardiovascular: Regular rate and rhythm. Pulmonary: Clear to ausculation bilaterally. On room air No wheezing, rhonchi or rales. Abdominal: Soft. Non-distended. Non-tender. Musculoskeletal: Good ROM in all extremities. No edema. Neurological: Alert, awake, and orientated x 3. Motor and sensory grossly intact. No focal deficits. A&Ox0. GCS of 2/3/5. Sedated with precedex.     LABORATORY RESULTS (LAST 24 HOURS)     CBC with Differential:    Lab Results   Component Value Date    WBC 4.7 04/08/2021    RBC 3.80 04/08/2021    HGB 11.6 04/08/2021    HCT 33.2 04/08/2021     04/08/2021    MCV 87.3 04/08/2021    MCH 30.6 04/08/2021    MCHC 35.1 04/08/2021    RDW 13.3 04/08/2021    LYMPHOPCT 5.4 04/08/2021    MONOPCT 9.3 04/08/2021    BASOPCT 0.1 04/08/2021    MONOSABS 0.4 04/08/2021    LYMPHSABS 0.3 04/08/2021    EOSABS 0.0 04/08/2021    BASOSABS 0.0 04/08/2021     CMP:    Lab Results   Component Value Date     04/09/2021    K 3.9 04/09/2021     04/09/2021    CO2 22 04/09/2021    BUN 15 04/09/2021    CREATININE 0.34 04/09/2021    GFRAA >60.0 04/09/2021    LABGLOM >60.0 04/09/2021    GLUCOSE 129 04/09/2021    PROT 7.0 04/07/2021    LABALBU 4.5 04/07/2021    CALCIUM 8.8 04/09/2021    BILITOT 0.6 04/07/2021    ALKPHOS 76 04/07/2021    AST 22 04/07/2021    ALT 23 04/07/2021     Magnesium:  No results found for: MG  PT/INR:    Lab Results   Component Value Date    PROTIME 13.8 04/07/2021    INR 1.1 04/07/2021     PTT:  No results found for: APTT, PTT    IMAGING RESULTS (PERSONALLY REVIEWED and per Radiology Report)     CT Head (4/9/21 at [address]): Stable SAH      ASSESSMENT & PLAN     Diagnoses:  1. S/p found down at home on 4/7/21  2. Bifrontal subarachnoid hemorrhage  3. Hyponatremia  4. Acute metabolic encephalopathy  5. Left parietal subarachnoid hemorrhage (seen on repeat CT Head on 4/8/21)    PMHx: Seizure disorder, chronic pain    Incidental Findings: None (Reviwed by JLR, 4/8/21)      ASSESSMENT/PLAN:  Neurological: Bifrontal subarachnoid hemorrhage, altered mental status 2/2 TBI and hyponatremia. Given 500cc bolus of Keppra at [facility] prior to transfer. [address] 4/8/21 with interval development of bifrontal SAH and new area of SAH at left parietal region. Per wife, patient with chronic back pain and worsened at baseline when in supine position.  - NSGY consulted  - rCT head stable this am  - q 2hr neurochecks until stable repeat CT head  - Will hold off on formal SLP until improved mentation  - Continue IV Keppra 500mg BID  - Seizure precautions  - Continue precedex and soft restraints given impulsivity and AMS  - Continue PO Tylenol 650mg q 6hrs prn mild/moderate pain  - Continue IV Dilaudid 0.5mg q3hrs prn for agitation related to pain. Per wife, patient with chronic back pain and worsened at baseline when in supine position.     Cardiovascular:   No acute cardiovascular issues. - Continue IV Hydralazine and Labetalol prn SBP >130mmHg as per NSGY     Respiratory:   No acute respiratory issues. - Maintain O2 sat >92% on room air  - Continue pulmonary toilet and encourage IS.      GI/Diet:  - NPO until stable repeat CT head and improved mentation  - Bowel regimen with daily Senna, Colace, and PRN Dulcolax.     Renal/Electrolytes: Hyponatremia (Na 124->129->130->131)  - IVF with NS @ 100cc/hr  - BMP q6hr     ID:   No active infection. Remains afebrile, normotensive, and without leukocytosis.   - No indication for Abx     Heme:   No acute hematologic issues.  - No

## 2021-04-09 NOTE — PROGRESS NOTES
Patient: Yesy Calhoun  Unit/Bed: IC04/IC04-01  YOB: 1960  MRN: [id number] Acct: [de-identified]   Admitting Diagnosis: SAH (subarachnoid hemorrhage) ([facility] 75.) [I60.9]  Admit Date:  4/7/2021  Hospital Day: 2    Current Medications:    Scheduled Meds:   levetiracetam  500 mg Intravenous Q12H    sodium chloride flush  10 mL Intravenous 2 times per day     Continuous Infusions:   sodium chloride      dexmedetomidine Stopped (04/09/21 1028)    sodium chloride 100 mL/hr at 04/09/21 0216     PRN Meds:.acetaminophen, hydrALAZINE, labetalol, sodium chloride flush, sodium chloride, promethazine **OR** ondansetron, acetaminophen, HYDROmorphone  .  sodium chloride      dexmedetomidine Stopped (04/09/21 1028)    sodium chloride 100 mL/hr at 04/09/21 0216        Recent Labs     04/07/21  1405 04/08/21  0516   WBC 2.9* 4.7*   HGB 11.7* 11.6*   HCT 34.6* 33.2*   MCV 90.6 87.3    179     Recent Labs     04/09/21  0154 04/09/21  0832 04/09/21  1437   * 133* 140   K 3.9 3.8 3.7    101 106   CO2 22 23 21   BUN 15 15 14   CREATININE 0.34* 0.33* 0.37*     Recent Labs     04/07/21  1405   AST 22   ALT 23   BILITOT 0.6   ALKPHOS 76     No results for input(s): LIPASE, AMYLASE in the last 72 hours. Recent Labs     04/07/21  1405   PROT 7.0   INR 1.1       Imaging Results:    Xr Elbow Right (min 3 Views)    Result Date: 4/7/2021  COMPARISON: August 30, 2016 HISTORY:    fall PATIENT NAME: GUERRERO PARKST: TECHNIQUE: XR ELBOW RIGHT (MIN 3 VIEWS) FINDINGS: The joint spaces are maintained. Swelling is seen over the dorsum and medial aspect of the elbow. A small calcification is again seen on the dorsal aspect of the elbow near the olecranon. No definite effusion is visualized. No acute fracture    Ct Head Wo Contrast    Result Date: 4/9/2021  CT HEAD WO CONTRAST : 4/9/2021 CLINICAL HISTORY:  [facility] follow-up . COMPARISON: 4/8/2021.  TECHNIQUE: Spiral unenhanced images were obtained of the head, with routine vasogenic edema There are now some scattered areas increased attenuation seen within the base of the left frontal lobe series 2 image 15 and small foci within the superior aspect of the left frontal lobe, series 2 image 2523. Either represent areas of hemorrhage. The visualized osseous structures are unremarkable. The visualized portion of the paranasal sinuses, and mastoids are unremarkable. IMPRESSION 1. INTERVAL DEVELOPMENT OF ACUTE HEMORRHAGIC PARENCHYMAL CONTUSIONS WITH SMALL SUBARACHNOID COMPONENTS IN THE LEFT POSTERIOR INFERIOR PARIETAL REGION. 2. THERE IS SMALL AREAS OF NEW INCREASED ATTENUATION DESCRIBED ABOVE WITHIN THE LEFT FRONTAL LOBE. FINDINGS REPRESENT SMALL AREAS OF PARENCHYMAL CONTUSION, HEMORRHAGE. 3. BIFRONTAL SUBDURAL HEMATOMAS WITH ASSOCIATED PARENCHYMAL HEMORRHAGIC CONTUSIONS AS DESCRIBED ABOVE. RECOMMEND MRI TO FURTHER EVALUATE All CT scans at this facility use dose modulation, iterative reconstruction, and/or weight based dosing when appropriate to reduce radiation dose to as low as reasonably achievable. Ct Head Wo Contrast    Result Date: 4/7/2021  COMPARISON: September 18, 2020. HISTORY:  fell off a wheel chair slight hematoma  occiput TECHNIQUE: The study was performed without contrast FINDINGS: In the bifrontal regions there is hyperdensity seen in the sulci and falx. The ventricular system is midline with no evidence for hydrocephalus. There is soft tissue prominence also seen The visualized paranasal sinuses and orbits appear unremarkable. No lytic or blastic changes seen in the calvarium. Bifrontal subarachnoid hemorrhage is seen. This was discussed with Dr. Philly Alves on today's date at 1:35 All CT scans at this facility use dose modulation, iterative reconstruction, and/or weight based dosing when appropriate to reduce radiation dose to as low as reasonably achievable.      Ct Cervical Spine Wo Contrast    Result Date: 4/7/2021  EXAMINATION: CT CERVICAL SPINE WO CONTRAST   DATE AND TIME:4/7/2021 6:44 PM CLINICAL HISTORY:Acute posterior neck pain  trauma  COMPARISON: None TECHNIQUE:Helical scanning was performed from the skull base through the remainder of the cervical spine without intravenous contrast. Sagittal and coronal reformats were obtained. The lack of contrast limits CT sensitivity of the soft tissues. All CT scans at this facility use dose modulation, iterative reconstruction, and/or weight based dosing when appropriate to reduce radiation dose to as low as reasonably achievable. [facility] FINDINGS  Severe motion artifact limits exam quality inaccuracy Fracture:Extreme motion artifact motion artifact. No obvious fracture. Consider plain film radiography the C-spine if clinical concerns persist Dense atlas:Dens atlas relationship is unremarkable. Soft tissues:Airway patent. No soft tisssue mass or adenopathy. Other findings: Normal age-related bony changes. Extreme motion artifact. No definite fracture. EXAMINATION: CT CERVICAL SPINE WO CONTRAST  DATE AND TIME:4/7/2021 6:44 PM CLINICAL HISTORY: Severe headache.  trauma  COMPARISON: None TECHNIQUE:Axial CT from skull base to vertex without  contrast..  All CT scans at this facility use dose modulation, iterative reconstruction, and/or weight based dosing when appropriate to reduce radiation dose to as low as reasonably achievable. FINDINGS. Bilateral frontal areas of hemorrhagic contusion with bifrontal frontal subarachnoid and subdural hemorrhage. Small subdural hemorrhages extend along the inner calvarium bilaterally extending approximately 4 mm deep to the inner calvarium. There is no midline shift. Small amount of subdural blood along the anterior falx. Small focus of hemorrhagic contusion along the left anterior temporal lobe. No intraventricular blood. No significant parenchymal edema demonstrated at this time. No other areas of acute hemorrhage. 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 . Globes intact.  No acute

## 2021-04-09 NOTE — PROGRESS NOTES
0900: call to eleanor for tylenol rectally and inform of SB    1700: did notify dr. Beck Villegas that pupils are 2 on the right and 3 on the left , pt is more alert and responding to command, dr. Beck Villegas is aware, nonew orders noted.     1730: dr. Beck Villegas at bedside

## 2021-04-10 PROBLEM — E87.1 HYPONATREMIA: Status: ACTIVE | Noted: 2021-04-10

## 2021-04-10 PROBLEM — N31.9 NEUROGENIC BLADDER: Status: ACTIVE | Noted: 2021-04-10

## 2021-04-10 PROBLEM — G89.11 ACUTE PAIN DUE TO TRAUMA: Status: ACTIVE | Noted: 2021-04-10

## 2021-04-10 PROBLEM — E87.6 HYPOKALEMIA: Status: ACTIVE | Noted: 2021-04-10

## 2021-04-10 PROBLEM — K59.2 NEUROGENIC BOWEL: Status: ACTIVE | Noted: 2021-04-10

## 2021-04-10 PROBLEM — S14.107A C7 SPINAL CORD INJURY (HCC): Status: ACTIVE | Noted: 2021-04-10

## 2021-04-10 PROBLEM — G93.41 ACUTE METABOLIC ENCEPHALOPATHY: Status: ACTIVE | Noted: 2021-04-10

## 2021-04-10 PROBLEM — E87.1 HYPONATREMIA: Status: RESOLVED | Noted: 2021-04-10 | Resolved: 2021-04-10

## 2021-04-10 PROBLEM — R41.89 COGNITIVE CHANGE: Status: ACTIVE | Noted: 2021-04-10

## 2021-04-10 LAB
ANION GAP SERPL CALCULATED.3IONS-SCNC: 8 MEQ/L (ref 9–15)
BUN BLDV-MCNC: 13 MG/DL (ref 6–20)
CALCIUM SERPL-MCNC: 8.7 MG/DL (ref 8.5–9.9)
CHLORIDE BLD-SCNC: 109 MEQ/L (ref 95–107)
CO2: 22 MEQ/L (ref 20–31)
CREAT SERPL-MCNC: 0.31 MG/DL (ref 0.7–1.2)
GFR AFRICAN AMERICAN: >60
GFR NON-AFRICAN AMERICAN: >60
GLUCOSE BLD-MCNC: 95 MG/DL (ref 70–99)
MAGNESIUM: 1.9 MG/DL (ref 1.7–2.4)
POTASSIUM REFLEX MAGNESIUM: 3.2 MEQ/L (ref 3.4–4.9)
SODIUM BLD-SCNC: 139 MEQ/L (ref 135–144)

## 2021-04-10 PROCEDURE — 51702 INSERT TEMP BLADDER CATH: CPT

## 2021-04-10 PROCEDURE — 2580000003 HC RX 258: Performed by: SURGERY

## 2021-04-10 PROCEDURE — 99222 1ST HOSP IP/OBS MODERATE 55: CPT | Performed by: PHYSICAL MEDICINE & REHABILITATION

## 2021-04-10 PROCEDURE — 1210000000 HC MED SURG R&B

## 2021-04-10 PROCEDURE — 2580000003 HC RX 258: Performed by: NEUROLOGICAL SURGERY

## 2021-04-10 PROCEDURE — 80048 BASIC METABOLIC PNL TOTAL CA: CPT

## 2021-04-10 PROCEDURE — 2500000003 HC RX 250 WO HCPCS: Performed by: NEUROLOGICAL SURGERY

## 2021-04-10 PROCEDURE — 6370000000 HC RX 637 (ALT 250 FOR IP): Performed by: PHYSICAL MEDICINE & REHABILITATION

## 2021-04-10 PROCEDURE — 6370000000 HC RX 637 (ALT 250 FOR IP): Performed by: NURSE PRACTITIONER

## 2021-04-10 PROCEDURE — 83735 ASSAY OF MAGNESIUM: CPT

## 2021-04-10 PROCEDURE — 6360000002 HC RX W HCPCS: Performed by: NEUROLOGICAL SURGERY

## 2021-04-10 PROCEDURE — 2700000000 HC OXYGEN THERAPY PER DAY

## 2021-04-10 PROCEDURE — APPSS45 APP SPLIT SHARED TIME 31-45 MINUTES: Performed by: NURSE PRACTITIONER

## 2021-04-10 PROCEDURE — 6360000002 HC RX W HCPCS: Performed by: NURSE PRACTITIONER

## 2021-04-10 PROCEDURE — 2500000003 HC RX 250 WO HCPCS: Performed by: SURGERY

## 2021-04-10 PROCEDURE — 36415 COLL VENOUS BLD VENIPUNCTURE: CPT

## 2021-04-10 PROCEDURE — 92610 EVALUATE SWALLOWING FUNCTION: CPT

## 2021-04-10 PROCEDURE — 2580000003 HC RX 258: Performed by: PHYSICIAN ASSISTANT

## 2021-04-10 PROCEDURE — 2500000003 HC RX 250 WO HCPCS: Performed by: NURSE PRACTITIONER

## 2021-04-10 RX ORDER — POTASSIUM CHLORIDE 7.45 MG/ML
10 INJECTION INTRAVENOUS
Status: DISPENSED | OUTPATIENT
Start: 2021-04-10 | End: 2021-04-10

## 2021-04-10 RX ORDER — BISACODYL 10 MG
10 SUPPOSITORY, RECTAL RECTAL DAILY PRN
Status: DISCONTINUED | OUTPATIENT
Start: 2021-04-10 | End: 2021-04-15 | Stop reason: HOSPADM

## 2021-04-10 RX ORDER — HYDRALAZINE HYDROCHLORIDE 20 MG/ML
10 INJECTION INTRAMUSCULAR; INTRAVENOUS EVERY 4 HOURS PRN
Status: DISCONTINUED | OUTPATIENT
Start: 2021-04-10 | End: 2021-04-12

## 2021-04-10 RX ORDER — LABETALOL HYDROCHLORIDE 5 MG/ML
10 INJECTION, SOLUTION INTRAVENOUS EVERY 4 HOURS
Status: DISCONTINUED | OUTPATIENT
Start: 2021-04-10 | End: 2021-04-11

## 2021-04-10 RX ORDER — BISACODYL 10 MG
10 SUPPOSITORY, RECTAL RECTAL EVERY OTHER DAY
Status: DISCONTINUED | OUTPATIENT
Start: 2021-04-10 | End: 2021-04-15 | Stop reason: HOSPADM

## 2021-04-10 RX ORDER — POTASSIUM CHLORIDE 29.8 MG/ML
20 INJECTION INTRAVENOUS
Status: DISCONTINUED | OUTPATIENT
Start: 2021-04-10 | End: 2021-04-10

## 2021-04-10 RX ORDER — DOCUSATE SODIUM 100 MG/1
100 CAPSULE, LIQUID FILLED ORAL 2 TIMES DAILY
Status: DISCONTINUED | OUTPATIENT
Start: 2021-04-10 | End: 2021-04-14

## 2021-04-10 RX ORDER — SENNA PLUS 8.6 MG/1
1 TABLET ORAL NIGHTLY
Status: DISCONTINUED | OUTPATIENT
Start: 2021-04-10 | End: 2021-04-14

## 2021-04-10 RX ADMIN — LEVETIRACETAM 500 MG: 100 INJECTION, SOLUTION INTRAVENOUS at 08:28

## 2021-04-10 RX ADMIN — LEVETIRACETAM 500 MG: 100 INJECTION, SOLUTION INTRAVENOUS at 23:00

## 2021-04-10 RX ADMIN — SODIUM CHLORIDE: 9 INJECTION, SOLUTION INTRAVENOUS at 05:44

## 2021-04-10 RX ADMIN — POTASSIUM CHLORIDE 10 MEQ: 10 INJECTION, SOLUTION INTRAVENOUS at 12:59

## 2021-04-10 RX ADMIN — LABETALOL HYDROCHLORIDE 10 MG: 5 INJECTION, SOLUTION INTRAVENOUS at 17:01

## 2021-04-10 RX ADMIN — Medication 10 ML: at 08:30

## 2021-04-10 RX ADMIN — GLYCERIN 2 G: 2 SUPPOSITORY RECTAL at 14:31

## 2021-04-10 RX ADMIN — LABETALOL HYDROCHLORIDE 10 MG: 5 INJECTION, SOLUTION INTRAVENOUS at 11:48

## 2021-04-10 RX ADMIN — POTASSIUM CHLORIDE 10 MEQ: 10 INJECTION, SOLUTION INTRAVENOUS at 10:52

## 2021-04-10 RX ADMIN — BISACODYL 10 MG: 10 SUPPOSITORY RECTAL at 13:04

## 2021-04-10 RX ADMIN — LABETALOL HYDROCHLORIDE 10 MG: 5 INJECTION, SOLUTION INTRAVENOUS at 23:00

## 2021-04-10 RX ADMIN — HYDRALAZINE HYDROCHLORIDE 10 MG: 20 INJECTION INTRAMUSCULAR; INTRAVENOUS at 13:06

## 2021-04-10 RX ADMIN — HYDRALAZINE HYDROCHLORIDE 10 MG: 20 INJECTION INTRAMUSCULAR; INTRAVENOUS at 08:32

## 2021-04-10 RX ADMIN — POTASSIUM CHLORIDE 10 MEQ: 10 INJECTION, SOLUTION INTRAVENOUS at 10:01

## 2021-04-10 RX ADMIN — SODIUM CHLORIDE 0.4 MCG/KG/HR: 9 INJECTION, SOLUTION INTRAVENOUS at 08:23

## 2021-04-10 RX ADMIN — DOCUSATE SODIUM 100 MG: 100 CAPSULE, LIQUID FILLED ORAL at 14:24

## 2021-04-10 RX ADMIN — SODIUM CHLORIDE: 9 INJECTION, SOLUTION INTRAVENOUS at 14:31

## 2021-04-10 RX ADMIN — POTASSIUM CHLORIDE 10 MEQ: 10 INJECTION, SOLUTION INTRAVENOUS at 11:58

## 2021-04-10 SDOH — SOCIAL STABILITY: SOCIAL INSECURITY
WITHIN THE LAST YEAR, HAVE TO BEEN RAPED OR FORCED TO HAVE ANY KIND OF SEXUAL ACTIVITY BY YOUR PARTNER OR EX-PARTNER?: NO

## 2021-04-10 SDOH — SOCIAL STABILITY: SOCIAL NETWORK: HOW OFTEN DO YOU ATTENT MEETINGS OF THE CLUB OR ORGANIZATION YOU BELONG TO?: NEVER

## 2021-04-10 SDOH — SOCIAL STABILITY: SOCIAL NETWORK: HOW OFTEN DO YOU ATTEND CHURCH OR RELIGIOUS SERVICES?: NEVER

## 2021-04-10 SDOH — SOCIAL STABILITY: SOCIAL INSECURITY: WITHIN THE LAST YEAR, HAVE YOU BEEN AFRAID OF YOUR PARTNER OR EX-PARTNER?: NO

## 2021-04-10 SDOH — ECONOMIC STABILITY: FOOD INSECURITY: WITHIN THE PAST 12 MONTHS, YOU WORRIED THAT YOUR FOOD WOULD RUN OUT BEFORE YOU GOT MONEY TO BUY MORE.: NEVER TRUE

## 2021-04-10 SDOH — ECONOMIC STABILITY: INCOME INSECURITY: HOW HARD IS IT FOR YOU TO PAY FOR THE VERY BASICS LIKE FOOD, HOUSING, MEDICAL CARE, AND HEATING?: NOT VERY HARD

## 2021-04-10 SDOH — SOCIAL STABILITY: SOCIAL NETWORK: IN A TYPICAL WEEK, HOW MANY TIMES DO YOU TALK ON THE PHONE WITH FAMILY, FRIENDS, OR NEIGHBORS?: NEVER

## 2021-04-10 SDOH — ECONOMIC STABILITY: FOOD INSECURITY: WITHIN THE PAST 12 MONTHS, THE FOOD YOU BOUGHT JUST DIDN'T LAST AND YOU DIDN'T HAVE MONEY TO GET MORE.: NEVER TRUE

## 2021-04-10 SDOH — SOCIAL STABILITY: SOCIAL NETWORK: HOW OFTEN DO YOU GET TOGETHER WITH FRIENDS OR RELATIVES?: NEVER

## 2021-04-10 ASSESSMENT — ENCOUNTER SYMPTOMS
DIARRHEA: 0
COUGH: 0
VOMITING: 0
WHEEZING: 0
ABDOMINAL PAIN: 0
PHOTOPHOBIA: 0
EYE REDNESS: 0
CONSTIPATION: 1
NAUSEA: 0
SHORTNESS OF BREATH: 1
STRIDOR: 0
SORE THROAT: 0
EYE PAIN: 0
BACK PAIN: 1
BLOOD IN STOOL: 0

## 2021-04-10 ASSESSMENT — PAIN SCALES - PAIN ASSESSMENT IN ADVANCED DEMENTIA (PAINAD)
BREATHING: 1
BODYLANGUAGE: 2
CONSOLABILITY: 0
TOTALSCORE: 5
BODYLANGUAGE: 2
NEGVOCALIZATION: 0
TOTALSCORE: 5
CONSOLABILITY: 0
FACIALEXPRESSION: 2
NEGVOCALIZATION: 0
BODYLANGUAGE: 2
CONSOLABILITY: 0
FACIALEXPRESSION: 2
BODYLANGUAGE: 2
BODYLANGUAGE: 2
BREATHING: 1
NEGVOCALIZATION: 0
NEGVOCALIZATION: 0
TOTALSCORE: 5
BREATHING: 1
BREATHING: 1
BODYLANGUAGE: 2
CONSOLABILITY: 0
TOTALSCORE: 5

## 2021-04-10 ASSESSMENT — PAIN SCALES - GENERAL: PAINLEVEL_OUTOF10: 0

## 2021-04-10 NOTE — PROGRESS NOTES
Patient: Jake Severe  Unit/Bed: IC04/IC04-01  YOB: 1960  MRN: [id number] Acct: [de-identified]   Admitting Diagnosis: SAH (subarachnoid hemorrhage) ([facility] [[email or web address] 75.) [I60.9]  Admit Date:  4/7/2021  Hospital Day: 3    Current Medications:    Scheduled Meds:   potassium chloride  10 mEq Intravenous Q1H    levetiracetam  500 mg Intravenous Q12H    sodium chloride flush  10 mL Intravenous 2 times per day     Continuous Infusions:   sodium chloride      dexmedetomidine Stopped (04/10/21 1001)    sodium chloride 100 mL/hr at 04/10/21 0544     PRN Meds:.bisacodyl, acetaminophen, hydrALAZINE, labetalol, sodium chloride flush, sodium chloride, promethazine **OR** ondansetron, acetaminophen, HYDROmorphone  .  sodium chloride      dexmedetomidine Stopped (04/10/21 1001)    sodium chloride 100 mL/hr at 04/10/21 0544        Recent Labs     04/07/21  1405 04/08/21  0516   WBC 2.9* 4.7*   HGB 11.7* 11.6*   HCT 34.6* 33.2*   MCV 90.6 87.3    179     Recent Labs     04/09/21  1437 04/09/21  2043 04/10/21  0706    138 139   K 3.7 3.5 3.2*    106 109*   CO2 21 22 22   BUN 14 14 13   CREATININE 0.37* 0.32* 0.31*     Recent Labs     04/07/21  1405   AST 22   ALT 23   BILITOT 0.6   ALKPHOS 76     No results for input(s): LIPASE, AMYLASE in the last 72 hours. Recent Labs     04/07/21  1405   PROT 7.0   INR 1.1       Imaging Results:    Xr Elbow Right (min 3 Views)    Result Date: 4/7/2021  COMPARISON: August 30, 2016 HISTORY:    fall PATIENT NAME: GUERRERO PARKST: TECHNIQUE: XR ELBOW RIGHT (MIN 3 VIEWS) FINDINGS: The joint spaces are maintained. Swelling is seen over the dorsum and medial aspect of the elbow. A small calcification is again seen on the dorsal aspect of the elbow near the olecranon. No definite effusion is visualized. No acute fracture    Ct Head Wo Contrast    Result Date: 4/9/2021  CT HEAD WO CONTRAST : 4/9/2021 CLINICAL HISTORY:  [facility] follow-up . COMPARISON: 4/8/2021.  TECHNIQUE: Spiral unenhanced images were obtained of the head, with routine multiplanar reconstructions performed. All CT scans at this facility use dose modulation, iterative reconstruction, and/or weight based dosing when appropriate to reduce radiation dose to as low as reasonably achievable. FINDINGS: Bifrontal hemorrhagic contusions measuring up to approximately 2 cm on the right, mild predominantly superolateral subarachnoid hemorrhage and up to 3 mm small left frontal subdural hematomas laterally and along the left side of the anterior falx appear unchanged There is no midline shift, hydrocephalus, or other significant changes identified. The mastoid air cells and visualized paranasal sinuses are essentially clear. STABLE ACUTE INTRACRANIAL HEMORRHAGES FROM YESTERDAY. NO EVIDENCE OF INTERVAL COMPLICATION IDENTIFIED. Ct Head Wo Contrast    Result Date: 4/8/2021  CT HEAD WO CONTRAST CLINICAL HISTORY: Bifrontal subdural hematoma with subarachnoid hemorrhage., Follow-up. Comparison: April 7, 2021 1838 hours TECHNIQUE: Multiple unenhanced serial axial images of the brain from the vertex of the skull to the base of the skull were performed. FINDINGS: The ventricles are dilated. Unchanged size configuration. Mo.  No mass. No midline shift. The cisterns are patent. There is developing area of hemorrhagic parenchymal contusion involving the left posterior inferior aspect of the parietal lobe. There is small subarachnoid hemorrhage noted. Again note is made of bifrontal subdural hematomas with extension of the subdural hematoma into the interhemispheric fissure. The left subdural hematoma extends overlying the left frontal lobe to the level of the posterior aspect of the left frontal lobe. There is associated bifrontal hemorrhagic contusions. The multiple parenchymal contusions are best appreciated on the coronal reconstructions series 601 image 34.  There is been some interval enlargement of the right as compared to the prior examination. There is now some increasing surrounding vasogenic edema There are now some scattered areas increased attenuation seen within the base of the left frontal lobe series 2 image 15 and small foci within the superior aspect of the left frontal lobe, series 2 image 2523. Either represent areas of hemorrhage. The visualized osseous structures are unremarkable. The visualized portion of the paranasal sinuses, and mastoids are unremarkable. IMPRESSION 1. INTERVAL DEVELOPMENT OF ACUTE HEMORRHAGIC PARENCHYMAL CONTUSIONS WITH SMALL SUBARACHNOID COMPONENTS IN THE LEFT POSTERIOR INFERIOR PARIETAL REGION. 2. THERE IS SMALL AREAS OF NEW INCREASED ATTENUATION DESCRIBED ABOVE WITHIN THE LEFT FRONTAL LOBE. FINDINGS REPRESENT SMALL AREAS OF PARENCHYMAL CONTUSION, HEMORRHAGE. 3. BIFRONTAL SUBDURAL HEMATOMAS WITH ASSOCIATED PARENCHYMAL HEMORRHAGIC CONTUSIONS AS DESCRIBED ABOVE. RECOMMEND MRI TO FURTHER EVALUATE All CT scans at this facility use dose modulation, iterative reconstruction, and/or weight based dosing when appropriate to reduce radiation dose to as low as reasonably achievable. Ct Head Wo Contrast    Result Date: 4/7/2021  COMPARISON: September 18, 2020. HISTORY:  fell off a wheel chair slight hematoma  occiput TECHNIQUE: The study was performed without contrast FINDINGS: In the bifrontal regions there is hyperdensity seen in the sulci and falx. The ventricular system is midline with no evidence for hydrocephalus. There is soft tissue prominence also seen The visualized paranasal sinuses and orbits appear unremarkable. No lytic or blastic changes seen in the calvarium. Bifrontal subarachnoid hemorrhage is seen. This was discussed with Dr. MIMS on today's date at 1:35 All CT scans at this facility use dose modulation, iterative reconstruction, and/or weight based dosing when appropriate to reduce radiation dose to as low as reasonably achievable.      Ct Cervical Spine Wo Contrast    Result Date: 4/7/2021  EXAMINATION: CT CERVICAL SPINE WO CONTRAST   DATE AND TIME:4/7/2021 6:44 PM CLINICAL HISTORY:Acute posterior neck pain  trauma  COMPARISON: None TECHNIQUE:Helical scanning was performed from the skull base through the remainder of the cervical spine without intravenous contrast. Sagittal and coronal reformats were obtained. The lack of contrast limits CT sensitivity of the soft tissues. All CT scans at this facility use dose modulation, iterative reconstruction, and/or weight based dosing when appropriate to reduce radiation dose to as low as reasonably achievable. Dominic  FINDINGS  Severe motion artifact limits exam quality inaccuracy Fracture:Extreme motion artifact motion artifact. No obvious fracture. Consider plain film radiography the C-spine if clinical concerns persist Dense atlas:Dens atlas relationship is unremarkable. Soft tissues:Airway patent. No soft tisssue mass or adenopathy. Other findings: Normal age-related bony changes. Extreme motion artifact. No definite fracture. EXAMINATION: CT CERVICAL SPINE WO CONTRAST  DATE AND TIME:4/7/2021 6:44 PM CLINICAL HISTORY: Severe headache.  trauma  COMPARISON: None TECHNIQUE:Axial CT from skull base to vertex without  contrast..  All CT scans at this facility use dose modulation, iterative reconstruction, and/or weight based dosing when appropriate to reduce radiation dose to as low as reasonably achievable. FINDINGS. Bilateral frontal areas of hemorrhagic contusion with bifrontal frontal subarachnoid and subdural hemorrhage. Small subdural hemorrhages extend along the inner calvarium bilaterally extending approximately 4 mm deep to the inner calvarium. There is no midline shift. Small amount of subdural blood along the anterior falx. Small focus of hemorrhagic contusion along the left anterior temporal lobe. No intraventricular blood. No significant parenchymal edema demonstrated at this time. No other areas of acute hemorrhage. 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 . Globes intact. No acute fracture. Paranasal sinuses and mastoids are clear. These findings were discussed with the ER doctor IMPRESSION: BIFRONTAL HEMORRHAGIC CONTUSIONS WITH SMALL BILATERAL SUBDURAL HEMATOMAS. NO ACUTE FRACTURE       BP (!) 155/83   Pulse 68   Temp 97.7 °F (36.5 °C) (Bladder)   Resp 12   Ht 6' 1\" (1.854 m)   Wt 137 lb 9.1 oz (62.4 kg)   SpO2 93%   BMI 18.15 kg/m²     Hospital day 3. No new complaints. More alert attentive. Awake when he was seen by me in ICU. Afebrile vital signs are stable. Awake alert and attentive. Patient is not oriented to place being at home. Pupils 3 mm equal sluggish reactive to light positive tracking. Speech intact no aphasia. Slow in fluency. Stable motor. GCS 14    Status post fall with contrecoup traumatic subdural/intraparenchymal hematoma. Neurologically continues to improve. From neurosurgical standpoint, patient can be transferred to floor with every 4 neuro check. Continue with Keppra x1 week. Discussed with the wife regarding a brain injury which make time for recovery with with possible difficulty with memory and emotions/affect.     Regular diet after swallow eval

## 2021-04-10 NOTE — PROGRESS NOTES
Shift summary:    1900 handoff of care at bedside. Skin checked for integrity. Skin intact. Patient remains on room air. Oxygen saturation WNL. IV fluids veriified. 1940 sister, Remedios Wade called for update on patient. HIPAA code requested and received. Update given. Patient assessed. Patient noted to have unequal pupils. Left pupil at 3 mm and right pupil is at 2 mm. Both are brisk and reactive. Per previous shift RN, Dr. Kris Aldana is aware of the variance in pupil size. 2015 patient appearing agitated at this time. Patient attempting to get out of bed. Patient redirected to person and place. Patient denied that he was in the hospital and that he was at home. He claimed that this RN was his wife. This RN re-orientated the patient and started discussing his work at [employer]. The patient was able to answer some questions about [employer] and what he did there. Precedex was titrated to 0.5 mcg/kg/hr for patient comfort. 2130 precedex titrated down to 0.4 mcg/kg/hr for patient comfort. Patient resting with eyes closed. 0230 neuro checks have been performed Q 1-2 hours. Pupils appear equal at this time and remain brisk. 0500 neuro check performed. See neuro flow sheet for details. There is a slight variation in the pupil size from left eye versus right. Patient answers questions and follows commands appropriately when asked to do so.

## 2021-04-10 NOTE — PROGRESS NOTES
Spoke with patient's wife, Lucio Garay via phone ([phone]), due to ongoing visitor restrictions due to COVID-19. Curtis Lyon was updated on the patient's improving condition, his current mental status, and speech therapy evaluation today as well as the current plan of care with plans to transfer Malena Quiroga to the regular nursing floor this evening. She verbalized an understanding of all information provided to her. All questions were answered.      RASHEED Brunner-CNP  Trauma/Critical Care/Emergency General Surgery  [phone] (2T-4H)  [phone]
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
## 2021-04-10 NOTE — PROGRESS NOTES
TRAUMA DAILY PROGRESS NOTE      Patient Name: Kristina Neil  Admission Date 4/7/2021    Hospital Day: 3  Patient seen and examined on 4/10/2021    INTERVAL HISTORY/EVENTS     Background:  Kristina Neil is a 62 y.o. male with a PMHx of paraplegia presented as a transfer from [facility] on 4/7/21 after he was found to have bifrontal subarachnoid hemorrhage. Per reports patient was found down at his home by a [employer] . It was suspected that he feel backwards out of his wheelchair. Upon arrival to [facility] [place] [facility] [place] [facility], pt noted to be behavioral, impulsive, and altered from baseline mentation. A&Ox1 (self), pulling at line, and physically/verbally combative with staff requiring Ativan and soft restraints. Per wife, his baseline mentation is A&Ox4 and he holds a job with [employer]. He was admitted to the Trauma Service with Neurosurgery (Dr. Jesusita Herrmann) consulted.  [facility] Course:  4/7/2021: Found to have bifrontal SAH at [facility] and transferred to [facility] s/p found down at home by [employer] . AMS and hyponatremia noted upon arrival. NSGY consulted. 4/8/2021: Precedex gtt started for impulsivity and safety. Remains A&Ox0, GCS 2/3/5. [address] with interval development of bifrontal SAH and new area of SAH at left parietal region. 4/9/2021: Repeat CT head completed-stable. Precedex gtt weaned off but had to be resumed in the evening due to agitation/restlessness. 24 Hour Events: This is my first time meeting this patient. Precedex infusion resumed yesterday evening due to increased agitation/restlessness. Repeat CT head completed yesterday morning-stable from prior. Patient awake this morning on examination without any complaints. He denies headache, visual changes, new numbness, weakness or paresthesias. Denies chest pain, shortness of breath or abdominal pain. No PO intake since admission due to patient's mental status. No reported nausea/vomiting. Labs reviewed. No new CBC.   BMP with stable sodium 139 (138).  Hypokalemic this AM 3.2. BUN and creatinine are stable 13/0.31 (14/0.32). Intake: 2731 mL   IVF: 2731 mL   PO: 0 mL    Output: 1900 mL   UOP: 1900 mL   BM x 0 occurrences, no documented BM since admit on 2021      PHYSICAL EXAM     Vitals Average, Min and Max for last 24 hours:  Temp: Temp: 98.4 °F (36.9 °C); Temp  Av.2 °F (36.8 °C)  Min: 98.1 °F (36.7 °C)  Max: 98.4 °F (36.9 °C)  Respirations: Resp  Av.2  Min: 9  Max: 25  Pulse: Pulse  Av.2  Min: 54  Max: 93  Blood Pressure: Systolic (78ZYD), DFH:212 , Min:111 , MAD:466   ; Diastolic (60AOH), ZMS:38, Min:62, Max:104    SpO2: SpO2  Av.1 %  Min: 94 %  Max: 96 %    24hr Intake/Output:     Intake/Output Summary (Last 24 hours) at 4/10/2021 0718  Last data filed at 4/10/2021 0536  Gross per 24 hour   Intake 2731 ml   Output 1900 ml   Net 831 ml       Vitals: /73   Pulse 67   Temp 98.4 °F (36.9 °C) (Bladder)   Resp 14   Ht 6' 1\" (1.854 m)   Wt 137 lb 9.1 oz (62.4 kg)   SpO2 95%   BMI 18.15 kg/m²     Physical Exam:  Constitutional: Lying in bed, HOB elevated. Appears comfortable, in NAD. HEENT: Atraumatic normocephalic. Cardiovascular: Regular rate and rhythm. Palpable radial and DP pulses bilaterally. Pulmonary: Clear to ausculation bilaterally. No wheezing, rhonchi or rales. Breathing comfortably on room air. Abdominal: Soft. Non-distended. Non-tender. : Indwelling toscano cathter in place (chronic) , draining clear, yellow urine. Musculoskeletal: Moves BUE to command, no motor in BLE secondary to prior SCI. No peripheral edema appreciated. Neurological: Alert, awake, and orientated to self only. States \"home\" for place and \"3771\" for time. Face symmetric, tongue midline. OD 2 mm and brisk OS 3mm and brisk. EOMI. Able to follow commands with BUE. Gross motor in BUE 4/5 in HG, otherwise 5/5. No motor in BLE secondary to prior SCI. Sensation intact to BUE. Skin: Warm and dry.      LABORATORY RESULTS (LAST 24 HOURS)     CBC with Differential:    Lab Results   Component Value Date    WBC 4.7 04/08/2021    RBC 3.80 04/08/2021    HGB 11.6 04/08/2021    HCT 33.2 04/08/2021     04/08/2021    MCV 87.3 04/08/2021    MCH 30.6 04/08/2021    MCHC 35.1 04/08/2021    RDW 13.3 04/08/2021    LYMPHOPCT 5.4 04/08/2021    MONOPCT 9.3 04/08/2021    BASOPCT 0.1 04/08/2021    MONOSABS 0.4 04/08/2021    LYMPHSABS 0.3 04/08/2021    EOSABS 0.0 04/08/2021    BASOSABS 0.0 04/08/2021     CMP:    Lab Results   Component Value Date     04/09/2021    K 3.5 04/09/2021     04/09/2021    CO2 22 04/09/2021    BUN 14 04/09/2021    CREATININE 0.32 04/09/2021    GFRAA >60.0 04/09/2021    LABGLOM >60.0 04/09/2021    GLUCOSE 112 04/09/2021    PROT 7.0 04/07/2021    LABALBU 4.5 04/07/2021    CALCIUM 8.5 04/09/2021    BILITOT 0.6 04/07/2021    ALKPHOS 76 04/07/2021    AST 22 04/07/2021    ALT 23 04/07/2021     Magnesium:    Lab Results   Component Value Date    MG 1.8 04/09/2021     PT/INR:    Lab Results   Component Value Date    PROTIME 13.8 04/07/2021    INR 1.1 04/07/2021     PTT:  No results found for: APTT, PTT    IMAGING RESULTS (PERSONALLY REVIEWED)     All admission and follow up imaging reviewed. No new imaging. ASSESSMENT & PLAN     Diagnoses:  1. S/p found down at home on 4/7/21  2. Bifrontal subarachnoid hemorrhage (NSGY, non-op)  3. Hyponatremia (resolved)  4. Acute metabolic encephalopathy  5. Left parietal subarachnoid hemorrhage (seen on repeat CT Head on 4/8/21, NSGY, non-op)  6. Hypokalemia   7. Neurogenic bladder     PMHx: Seizure disorder, chronic pain     Incidental Findings: None (Reviewed by JLR, 4/8/21)        ASSESSMENT/PLAN:  Neurological:   Bifrontal subarachnoid hemorrhage, altered mental status 2/2 TBI and hyponatremia. Given 500cc bolus of Keppra at [facility] prior to transfer.  [address] 4/8/21 with interval development of bifrontal contusions and new area of SAH at left parietal region as well as bifrontal SDH. Per wife, patient with chronic back pain and worsened at baseline when in supine position.  - NSGY consulted, appreciate recs: continue with keppra, change to PO when able, Q2 neuro checks  - Continue Q2 neuro checks today, pending NSGY recommendations  - Continue IV Keppra 500mg BID-transition to PO when patient more awake  - Continue seizure precautions  - Discontinue precedex infusion today and monitor mental status/agitation  - Will hold off on formal SLP until improved mentation, will re-evaluate for appropriateness of SLP evaluation later today  - Continue PO Tylenol 650mg q 6hrs prn mild/moderate pain  - Continue IV Dilaudid 0.5mg q3hrs prn for agitation related to pain.      Cardiovascular:   No acute cardiovascular issues. BP's have been acceptable with no tachycardia appreciated. - Continue IV Hydralazine and Labetalol PRN SBP >130mmHg as per NSGY     Respiratory:   No acute respiratory issues. Acceptable O2 saturations on room air.   - Maintain O2 sat >92% on room air  - Continue pulmonary toilet and encourage IS.     GI/Diet:  Remains NPO in the setting of poor mentation. No documented BM since admit on 4/7/2021  - NPO until improved mentation and SLP evaluation  - Holding PO bowel regimen in the setting of poor mentation.   - Start PRN dulcolax suppository     Renal/Electrolytes:   Hyponatremia resolved, hypokalemic this AM. Chronic indwelling toscano catheter for neurogenic bladder in the setting of prior spinal cord injury.    - IVF with NS @ 100cc/hr while NPO  - Replete electrolytes PRN  - D/C Q6h BMP and change to daily now that sodium has improved/stabilized. - Maintain indwelling toscano catheter.      ID:   No active infection. Remains afebrile, normotensive, and without leukocytosis. Leukopenic on CBC from 4/8.    - No indication for Abx  - Repeat CBC in AM to evaluate for resolution of leukopenia     Heme:   No acute hematologic issues.  H and H acceptable on 4/8 CBC>   - No indication for transfusion.  - Repeat CBC in AM to trend H and H.      Endocrine:   No acute glycemic/endocrine issues.     MSK:   PMHx of paraplegia. - Activity: OOB to chair TID  - Spines: Clear  - Weight bearing restrictions: None  - PT/OT: Will hold off on formal consult until improved MS, anticipate today vs tomorrow    Tubes/Lines/Drains:  -Maintain PIVs  -Maintain toscano catheter. Patient has chronic, indwelling toscano catheter that he presented to hospital with, for history of neurogenic bladder in the setting of spinal cord injury.      Prophylaxis:   - SCDs only, No chemo PPx given bleed risk and most recent CT head only showed stability on 4/6/2021  - No GI PPx indicated     Dispo: Remain on trauma Service (ICU Status) for continued monitoring of neurological status. If patient remains off precedex infusion and patient cleared by NSGY for Q4 neuro checks will plan for transfer to [facility] later today.        Follow up with Neurosurgery (Dr. Chris Rivera) in TBD  No Trauma follow up needed        Please call for any questions or concerns. e 16  [phone] (6N-4C) [phone]     This patient's plan of care was discussed and made in collaboration with Trauma Attending physician, Kimani Grove MD.      ------------------  Teaching Physician Note:  I have personally performed a face to face diagnostic  evaluation on this patient. I have reviewed and agree with the care plan. History and exam by me demonstrates:  Improved mental status, required precedex gtt overnight  Follows commands, still disoriented to year/location  Sodium normalized      Plan/MDM:  - sleep/wake, delirium prevention strategies  - wean precedex to off  - Q2 neurochecks, poss downgrade to floor status if remains off precedex gtt and OK for Q4 neurchecks per NSGY  - daily chemistries, no further need for serial Na checks  - SLP consult for swallow, advance diet per recs.  If remains NPO or poor mental status will place corpak and start TFs  - continue keppra x7d    Niurka Gamboa MD  Trauma/Critical Care/General Surgery  [phone] (2R-1N)  [phone]

## 2021-04-10 NOTE — PROGRESS NOTES
[facility]   Facility/Department: [facility]  Speech Language Pathology  Clinical Bedside Swallow Evaluation    NAME:Cam Luna  : 1960 (62 y.o.)   MRN: [id number]  ROOM: [place] [address]  ADMISSION DATE: 2021  PATIENT DIAGNOSIS(ES): SAH (subarachnoid hemorrhage) ([address] [I60.9]  Chief Complaint   Patient presents with    Head Injury     transfer from [facility] for Head injury SA bleed     Patient Active Problem List    Diagnosis Date Noted    C7 spinal cord injury ([address] 04/10/2021    Cognitive change 04/10/2021    Neurogenic bowel 04/10/2021    Neurogenic bladder 04/10/2021    SAH (subarachnoid hemorrhage) ([address] 2021    Pulmonary nodule, left 2019    Dysuria 2018    Urinary tract infection without hematuria 2018    Bilateral presbyopia 10/19/2017    Family history of glaucoma in father 10/19/2017    Myopia, bilateral 10/19/2017    Pressure ulcer of contiguous site of back, buttock and hip, stage 2 ([address] 2016    Severe protein-calorie malnutrition ([address] 2016    Restrictive lung disease 2016    Depression 2014    Low testosterone 2014    Osteopenia 2014    Shoulder arthritis 2014    Hypertrophy of nasal turbinates 2013    Aspiration into respiratory tract 10/24/2013    Neuromuscular respiratory weakness 10/24/2013    Chronic ethmoidal sinusitis 07/10/2013    Bilateral hand pain 2013    Wrist pain, chronic 2013    Right arm weakness 2013    Hemorrhoids, internal     Hemorrhoids, external     Bursitis of shoulder, left      Past Medical History:   Diagnosis Date    Bursitis of shoulder, left 2008    acute    Hemorrhoids, external 2004    Hemorrhoids, internal 2003    Paraplegia ([address] 2008    permanent    Quadriplegia, C5-C7, complete ([address]     Spinal cord injury 2009    with paraplegia     Past Surgical History:   Procedure Laterality Date    COLONOSCOPY functional limits  Labial Strength: Reduced  Lingual Strength: Reduced    Oral Phase Dysfunction  Oral Phase  Oral Phase: Exceptions  Oral Phase Dysfunction  Decreased Anterior to Posterior Transit: Soft solid  Lingual/Palatal Residue: Soft solid  Oral Phase  Oral Phase - Comment: Pt presents with mild oral dysphagia characterized by impair labial seal around cup, decreased AP transit and lingual/palatal residue following trials of soft solid. Pt was able to clear residue with cued re-swallow or followed by trial of puree consistency. It should be noted that following every bite, pt would say \"more, more\" sometimes before bolus was completely cleared from oral cavity. Indicators of Pharyngeal Phase Dysfunction   Pharyngeal Phase  Pharyngeal Phase: WFL  Pharyngeal Phase   Pharyngeal: Pharyngeal phase WFL. Hyolaryngeal movement present during evaluation, noted through observation and palpation. No overt s/s of aspiration observed following any of the presented consistencies, including single and consecutive sips from cup an straw. Impression  Dysphagia Diagnosis: Mild oral stage dysphagia  Dysphagia Impression : Mild oral phase dysphagia; pharyngeal phase WFL  Dysphagia Outcome Severity Scale: Level 5: Mild dysphagia- Distant supervision. May need one diet consistency restricted     Treatment Plan  Requires SLP Intervention: Yes  Duration/Frequency of Treatment: 2-3x/wk for LOS or until all goals met  D/C Recommendations: To be determined  Referral To: Dietician    Treatment/Goals  Short-term Goals  Timeframe for Short-term Goals: 1 week  Goal 1: Pt will tolerate puree consistencies and thin liquids with no overt s/s of aspiration and adequate oral clearance of bolus in all given opportunities. Goal 2: Pt will tolerate 5/5 trials of minced and moist consistencies with adequate mastication and oral clearance of bolus in order to achieve least restrictive diet consistency.   Goal 3: Pt will complete tongue press

## 2021-04-10 NOTE — PROGRESS NOTES
Patient answering yes and no questions and able to talk more but still confused at times. Potassium 3.2 and was reported to trauma np.  precedex was turned off and patient tolerating that fine. Dr Pancho Del Castillo in to see patient he states yuliana his standpoint patient may transfer to [facility] floor.  Will ask trauma about a swallow eval.   1200- speech in to assess patient swallowing- pureed and thin liquid for patient- notified trauma np  1500- ring and necklace and 5 diet pepsi [place] [id number]

## 2021-04-11 ENCOUNTER — APPOINTMENT (OUTPATIENT)
Dept: GENERAL RADIOLOGY | Age: 61
DRG: 085 | End: 2021-04-11
Payer: COMMERCIAL

## 2021-04-11 ENCOUNTER — APPOINTMENT (OUTPATIENT)
Dept: CT IMAGING | Age: 61
DRG: 085 | End: 2021-04-11
Payer: COMMERCIAL

## 2021-04-11 LAB
ANION GAP SERPL CALCULATED.3IONS-SCNC: 14 MEQ/L (ref 9–15)
BACTERIA: ABNORMAL /HPF
BILIRUBIN URINE: NEGATIVE
BLOOD, URINE: NEGATIVE
BUN BLDV-MCNC: 12 MG/DL (ref 6–20)
CALCIUM SERPL-MCNC: 8.3 MG/DL (ref 8.5–9.9)
CHLORIDE BLD-SCNC: 110 MEQ/L (ref 95–107)
CLARITY: CLEAR
CO2: 21 MEQ/L (ref 20–31)
COLOR: YELLOW
CREAT SERPL-MCNC: 0.3 MG/DL (ref 0.7–1.2)
EPITHELIAL CELLS, UA: ABNORMAL /HPF (ref 0–5)
GFR AFRICAN AMERICAN: >60
GFR NON-AFRICAN AMERICAN: >60
GLUCOSE BLD-MCNC: 108 MG/DL (ref 60–115)
GLUCOSE BLD-MCNC: 109 MG/DL (ref 60–115)
GLUCOSE BLD-MCNC: 142 MG/DL (ref 60–115)
GLUCOSE BLD-MCNC: 98 MG/DL (ref 70–99)
GLUCOSE URINE: 500 MG/DL
HCT VFR BLD CALC: 34.5 % (ref 42–52)
HEMOGLOBIN: 11.7 G/DL (ref 14–18)
HYALINE CASTS: ABNORMAL /HPF (ref 0–5)
KETONES, URINE: 15 MG/DL
LEUKOCYTE ESTERASE, URINE: NEGATIVE
MAGNESIUM: 1.8 MG/DL (ref 1.7–2.4)
MCH RBC QN AUTO: 30.3 PG (ref 27–31.3)
MCHC RBC AUTO-ENTMCNC: 34 % (ref 33–37)
MCV RBC AUTO: 89.2 FL (ref 80–100)
NITRITE, URINE: POSITIVE
PDW BLD-RTO: 13.5 % (ref 11.5–14.5)
PERFORMED ON: ABNORMAL
PERFORMED ON: NORMAL
PERFORMED ON: NORMAL
PH UA: 5.5 (ref 5–9)
PLATELET # BLD: 209 K/UL (ref 130–400)
POTASSIUM REFLEX MAGNESIUM: 3.1 MEQ/L (ref 3.4–4.9)
PROTEIN UA: NEGATIVE MG/DL
RBC # BLD: 3.87 M/UL (ref 4.7–6.1)
RBC UA: ABNORMAL /HPF (ref 0–5)
SODIUM BLD-SCNC: 145 MEQ/L (ref 135–144)
SPECIFIC GRAVITY UA: 1.01 (ref 1–1.03)
UROBILINOGEN, URINE: 0.2 E.U./DL
WBC # BLD: 7.8 K/UL (ref 4.8–10.8)
WBC UA: ABNORMAL /HPF (ref 0–5)

## 2021-04-11 PROCEDURE — 36415 COLL VENOUS BLD VENIPUNCTURE: CPT

## 2021-04-11 PROCEDURE — 51702 INSERT TEMP BLADDER CATH: CPT

## 2021-04-11 PROCEDURE — 2580000003 HC RX 258: Performed by: NURSE PRACTITIONER

## 2021-04-11 PROCEDURE — 6360000002 HC RX W HCPCS: Performed by: NURSE PRACTITIONER

## 2021-04-11 PROCEDURE — 2580000003 HC RX 258: Performed by: NEUROLOGICAL SURGERY

## 2021-04-11 PROCEDURE — 87086 URINE CULTURE/COLONY COUNT: CPT

## 2021-04-11 PROCEDURE — 6360000004 HC RX CONTRAST MEDICATION: Performed by: NURSE PRACTITIONER

## 2021-04-11 PROCEDURE — 81001 URINALYSIS AUTO W/SCOPE: CPT

## 2021-04-11 PROCEDURE — 83735 ASSAY OF MAGNESIUM: CPT

## 2021-04-11 PROCEDURE — 80048 BASIC METABOLIC PNL TOTAL CA: CPT

## 2021-04-11 PROCEDURE — 2580000003 HC RX 258: Performed by: PHYSICIAN ASSISTANT

## 2021-04-11 PROCEDURE — 6360000002 HC RX W HCPCS: Performed by: NEUROLOGICAL SURGERY

## 2021-04-11 PROCEDURE — 2700000000 HC OXYGEN THERAPY PER DAY

## 2021-04-11 PROCEDURE — APPSS30 APP SPLIT SHARED TIME 16-30 MINUTES: Performed by: NURSE PRACTITIONER

## 2021-04-11 PROCEDURE — 70498 CT ANGIOGRAPHY NECK: CPT

## 2021-04-11 PROCEDURE — 87186 SC STD MICRODIL/AGAR DIL: CPT

## 2021-04-11 PROCEDURE — 71045 X-RAY EXAM CHEST 1 VIEW: CPT

## 2021-04-11 PROCEDURE — 2500000003 HC RX 250 WO HCPCS: Performed by: NURSE PRACTITIONER

## 2021-04-11 PROCEDURE — 87077 CULTURE AEROBIC IDENTIFY: CPT

## 2021-04-11 PROCEDURE — 85027 COMPLETE CBC AUTOMATED: CPT

## 2021-04-11 PROCEDURE — 6370000000 HC RX 637 (ALT 250 FOR IP): Performed by: NURSE PRACTITIONER

## 2021-04-11 PROCEDURE — 70450 CT HEAD/BRAIN W/O DYE: CPT

## 2021-04-11 PROCEDURE — 92523 SPEECH SOUND LANG COMPREHEN: CPT

## 2021-04-11 PROCEDURE — 1210000000 HC MED SURG R&B

## 2021-04-11 RX ORDER — LABETALOL HYDROCHLORIDE 5 MG/ML
10 INJECTION, SOLUTION INTRAVENOUS EVERY 4 HOURS PRN
Status: DISCONTINUED | OUTPATIENT
Start: 2021-04-11 | End: 2021-04-12

## 2021-04-11 RX ORDER — POTASSIUM CHLORIDE 7.45 MG/ML
10 INJECTION INTRAVENOUS
Status: COMPLETED | OUTPATIENT
Start: 2021-04-11 | End: 2021-04-11

## 2021-04-11 RX ORDER — ALBUTEROL SULFATE 2.5 MG/3ML
2.5 SOLUTION RESPIRATORY (INHALATION) EVERY 6 HOURS PRN
Status: DISCONTINUED | OUTPATIENT
Start: 2021-04-11 | End: 2021-04-12

## 2021-04-11 RX ORDER — MAGNESIUM SULFATE IN WATER 40 MG/ML
2000 INJECTION, SOLUTION INTRAVENOUS ONCE
Status: COMPLETED | OUTPATIENT
Start: 2021-04-11 | End: 2021-04-11

## 2021-04-11 RX ORDER — SODIUM CHLORIDE 9 MG/ML
INJECTION, SOLUTION INTRAVENOUS CONTINUOUS
Status: DISCONTINUED | OUTPATIENT
Start: 2021-04-11 | End: 2021-04-12

## 2021-04-11 RX ADMIN — POTASSIUM CHLORIDE 10 MEQ: 7.46 INJECTION, SOLUTION INTRAVENOUS at 08:44

## 2021-04-11 RX ADMIN — LEVETIRACETAM 500 MG: 100 INJECTION, SOLUTION INTRAVENOUS at 22:55

## 2021-04-11 RX ADMIN — POTASSIUM CHLORIDE 10 MEQ: 7.46 INJECTION, SOLUTION INTRAVENOUS at 11:01

## 2021-04-11 RX ADMIN — SODIUM CHLORIDE: 9 INJECTION, SOLUTION INTRAVENOUS at 18:34

## 2021-04-11 RX ADMIN — IOPAMIDOL 100 ML: 612 INJECTION, SOLUTION INTRAVENOUS at 19:21

## 2021-04-11 RX ADMIN — POTASSIUM CHLORIDE 10 MEQ: 7.46 INJECTION, SOLUTION INTRAVENOUS at 09:38

## 2021-04-11 RX ADMIN — Medication 10 ML: at 22:58

## 2021-04-11 RX ADMIN — LABETALOL HYDROCHLORIDE 10 MG: 5 INJECTION, SOLUTION INTRAVENOUS at 15:23

## 2021-04-11 RX ADMIN — LABETALOL HYDROCHLORIDE 10 MG: 5 INJECTION, SOLUTION INTRAVENOUS at 04:25

## 2021-04-11 RX ADMIN — HYDRALAZINE HYDROCHLORIDE 10 MG: 20 INJECTION INTRAMUSCULAR; INTRAVENOUS at 23:03

## 2021-04-11 RX ADMIN — POTASSIUM BICARBONATE 40 MEQ: 782 TABLET, EFFERVESCENT ORAL at 08:35

## 2021-04-11 RX ADMIN — POTASSIUM CHLORIDE 10 MEQ: 7.46 INJECTION, SOLUTION INTRAVENOUS at 11:00

## 2021-04-11 RX ADMIN — LEVETIRACETAM 500 MG: 100 INJECTION, SOLUTION INTRAVENOUS at 11:45

## 2021-04-11 RX ADMIN — MAGNESIUM SULFATE HEPTAHYDRATE 2000 MG: 40 INJECTION, SOLUTION INTRAVENOUS at 08:35

## 2021-04-11 NOTE — PROGRESS NOTES
[facility]   Facility/Department: [facility]  Speech Language Pathology  Initial Speech/Language/Cognitive Assessment    NAME:Cam Luna  : 1960 (23 y.o.)   MRN: [id number]  [id number]  ADMISSION DATE: 2021  PATIENT DIAGNOSIS(ES): SAH (subarachnoid hemorrhage) ([address] [I60.9]  Chief Complaint   Patient presents with    Head Injury     transfer from [facility] for Head injury SA bleed     Patient Active Problem List    Diagnosis Date Noted    C7 spinal cord injury ([address] 04/10/2021    Cognitive change 04/10/2021    Neurogenic bowel 04/10/2021    Neurogenic bladder 04/10/2021    Acute pain due to trauma 04/10/2021    Acute metabolic encephalopathy     Hypokalemia 04/10/2021    SAH (subarachnoid hemorrhage) ([address] 2021    Pulmonary nodule, left 2019    Dysuria 2018    Urinary tract infection without hematuria 2018    Bilateral presbyopia 10/19/2017    Family history of glaucoma in father 10/19/2017    Myopia, bilateral 10/19/2017    Pressure ulcer of contiguous site of back, buttock and hip, stage 2 ([address] 2016    Severe protein-calorie malnutrition ([address] 2016    Restrictive lung disease 2016    Depression 2014    Low testosterone 2014    Osteopenia 2014    Shoulder arthritis 2014    Hypertrophy of nasal turbinates 2013    Aspiration into respiratory tract 10/24/2013    Neuromuscular respiratory weakness 10/24/2013    Chronic ethmoidal sinusitis 07/10/2013    Bilateral hand pain 2013    Wrist pain, chronic 2013    Right arm weakness 2013    Hemorrhoids, internal     Hemorrhoids, external     Bursitis of shoulder, left      Past Medical History:   Diagnosis Date    Bursitis of shoulder, left 2008    acute    Hemorrhoids, external 2004    Hemorrhoids, internal 2003    Paraplegia ([address] 2008    permanent    Quadriplegia, C5-C7, complete [facility])     Spinal cord injury 07/23/2009    with paraplegia     Past Surgical History:   Procedure Laterality Date    COLONOSCOPY  07/18/2003    HEMORRHOID SURGERY  08/05/2004    Dr. Nilesh Husain  07/2008    left/Dr. Susana Jj       DATE ONSET: 4/7/2021    Date of Evaluation: 4/11/2021   Evaluating Therapist: MEGA Garcia    Assessment:  Cognitive Diagnosis: Pt presents with severe cognitive impairment in the area of attention characterized by decreased sustained and selective attention negatively impacting his ability to participate in functional activities. Please note that the severity of pt's language deficits made it difficult to assess other aspects of cognition, including orientation, memory, and problem solving. Suspect impaired. Aphasia Diagnosis: Pt presents with severe receptive language impairment characterized by decreased comprehension of 1 step directions and basic questions. Pt presents with severe expressive language impairment characterized by decreased direct naming and overall semantic organization. The aforementioned deficits negatively impact his ability to participate in meaningful interactions with his communication partners. Please note that his wife reports that he does have intermittent ability to participate in simple conversation. Pt appeared to be fatigued at time of this assessment. ST to continue to assess, monitor, and adjust goals as needed. Speech Diagnosis: Pt only phonated \"ah\" x 1 during this portion of the assessment, although his wife states that he was speaking earlier. Pt is highly fatigued at time of assessment. Diagnosis: Severe receptive and expressive language impairment. Suspect severe cognitive impairment. Recommendations:  Requires SLP Intervention: Yes  Duration/Frequency of Treatment: 2-4x/week of individual and/or group treatment sessions, as applicable, during LOS or until goals met  D/C Recommendations:  To be determined Goals:  Short-term Goals  Timeframe for Short-term Goals: 3 weeks  Goal 1: Pt will follow 1 step directions given orally with 75% accuracy with mod cues to increase the pt's ability to follow directions provided by caregivers for safe follow through with ADLs. Goal 2: Pt will identify objects/pictures within a field of 2-4 with 70% accuracy with mod cues in order to increase his understanding of objects in his environment for safer and more independent completion of ADLs. Goal 3: Pt will complete confrontational naming tasks with 60% accuracy with mod verbal cues to help the patient express his basic wants and needs. Goal 4: Pt will complete automatic speech tasks with 70% accuracy with mod verbal cues to help the patient express his basic wants and needs. Goal 5: To decrease cognitive deficits and improve attention to tasks for safe completion of ADLs, pt will sustain attention to participate in functional activities for up to 5 minutes with mild redirection. Goal 6: Pt will answer simple level yes/no questions with 70% accuracy with mild cues to assist the caregiver in obtaining important information regarding the patient's personal, medical, and safety needs. Long-term Goals  Timeframe for Long-term Goals: 3 weeks  Goal 1: Pt will improve his Receptive Language abilities to a Mod Assist level for comprehension of conversation and safety directions with familiar and unfamiliar communication partners. Goal 2: Pt will improve his Expressive Language abilities to a Mod Assist level for expression of complex wants, needs, feelings/ideas, and medical/safety information. Patient's goals: unable to state. His wife reports her goal is to improve his \"brain function for better quality of life\".     Subjective:   Previous level of function and limitations: See below-  General  Chart Reviewed: Yes  Patient assessed for rehabilitation services?: Yes  Family / Caregiver Present: Yes(Pt's wife present for evaluation.)  Subjective  Subjective: Attempted SLE this morning at 0845 and pt was unable to actively participate. Pt more responsive during this re-attempt, which took place at 1035. Pt'w wife reports that he is \"in and out\" and explains that he is able to have simple conversations with her periodically and other times, he is lethargic and does not respond verbally. 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 He did not shift eye gaze during evaluation. Suspect impaired.)  Hearing  Hearing: Within functional limits     Objective:     Oral/Motor  Oral Motor: Exceptions to WFL(Pt was able to follow simple directions for basic oral motor examination. Decreased lingual ROM and strength observed.)  Labial Strength: Reduced  Labial Sensation: Reduced  Lingual ROM: Reduced left; Reduced right  Lingual Strength: Reduced  Lingual Coordination: Reduced    Auditory Comprehension  Comprehension: Exceptions  Yes/No Questions: Severe(Pt answered 1/8 questions verbally. He did not move head to gesture or answer other 7/8 questions. His wife reports that is is occasionally able to do so, at times when he is more alert.)  Basic Questions: Severe  One Step Basic Commands: Severe(3/10.  Perseveration observed.)  Common Objects: Severe(0/3. Did not shift eye gaze to objects.)  Conversation: Severe  Interfering Components: Attention - sustained    Reading Comprehension  Reading Status: Unable to assess    Expression  Primary Mode of Expression: Verbal    Verbal Expression  Verbal Expression: Exceptions to functional limits  Initiation: Severe  Repetition: Severe(1/5)  Automatic Speech: Severe(Pt did not vocalize, but he did mouth 6/10 numbers and 4/7 SAMPSON when SLP was saying them aloud.)  Confrontation: Severe  Interfering Components: Attention    Written Expression  Written Expression: Unable to assess    Motor Speech  Motor Speech: Unable to assess(Pt only produced \"ahh\" x 1 and mouthed automatic speech tasks. His wife states that when he does speak, it is intelligible. Please continue to assess when pt is more alert.)    Pragmatics/Social Functioning  Pragmatics: Exceptions to [facility]  Eye Contact: Severe    Cognition:      Orientation  Overall Orientation Status: (Unable to assess secondary to severity of aphasia. Pt's wife did report that he asked her \"How did you know I was here? \" earlier. He also thought he was in his home earlier, as well. This suggests decreased orientation to situation and place.)  Attention  Attention: Exceptions to [facility]  Sustained Attention: Severe(Pt able to attend to task for up to 1 minute at a time.)  Memory  Memory: Unable to assess  Problem Solving  Problem Solving: Unable to assess  Numeric Reasoning  Numeric Reasoning: Unable to assess  Abstract Reasoning  Abstract Reasoning: Unable to assess  Safety/Judgement  Safety/Judgement: Unable to assess    Additional Assessments:   n/a    Prognosis:  Speech Therapy Prognosis  Prognosis: Fair  Prognosis Considerations: Severity of Impairments  Individuals consulted  Consulted and agree with results and recommendations: Patient; Family member;RN  Family member consulted: Pt's wife present for evaluation. Education:  Patient Education: Pt and wife educated on results of SLE  Patient Education Response: Needs reinforcement  Safety Devices in place: Yes  Type of devices: All fall risk precautions in place    Pain Assessment:  Initial Assessment:  Patient demonstrated no s/s of pain. Re-assessment:  Patient demonstrated no s/s of pain.       Therapy Time  SLP Individual Minutes  Time In: 7888  Time Out: 7257  Minutes: 30                 Signature: Electronically signed by MEGA Pete on 4/11/2021 at 11:46 AM

## 2021-04-11 NOTE — PROGRESS NOTES
Physical Therapy Missed Treatment   Facility/Department: [facility] MED SURG [id number]    NAME: Erasmo Tomlin    : 1960 (62 y.o.)  MRN: [id number]    Account: [de-identified]  Gender: male      PT referral received. Chart reviewed. PT eval attempted at 1545. Pt unable to follow 1 step instructions, non-verbal, and resistive to PROM B UEs. Unable to locate RN to inform of eval attempt. Will follow and attempt PT evaluation again at earliest availability possibly when pt's wife is present to provide PLOF information.       Uli Jackson, PT, 21 at 4:18 PM

## 2021-04-11 NOTE — PROGRESS NOTES
images were obtained of the head, with routine multiplanar reconstructions performed. All CT scans at this facility use dose modulation, iterative reconstruction, and/or weight based dosing when appropriate to reduce radiation dose to as low as reasonably achievable. FINDINGS: A small predominantly low-density left frontoparietal subdural hematoma/hygroma has mildly increased measuring up to 5 mm on the coronal reconstructions. There is no significant midline shift, hydrocephalus, or other significant interval change identified. Bifrontal hemorrhagic contusions anteriorly measuring up to 2 cm on the right and mild subarachnoid hemorrhage appear unchanged. A nondisplaced occipital fracture extending to the foramen magnum to the right of midline is again noted. MILDLY INCREASING UP TO 5 MM LEFT FRONTOPARIETAL SUBDURAL HYGROMA/HEMATOMA. OTHERWISE, STABLE HEAD CT FROM 4/9/2021. Ct Head Wo Contrast    Result Date: 4/9/2021  CT HEAD WO CONTRAST : 4/9/2021 CLINICAL HISTORY:  [facility] follow-up . COMPARISON: 4/8/2021. TECHNIQUE: Spiral unenhanced images were obtained of the head, with routine multiplanar reconstructions performed. All CT scans at this facility use dose modulation, iterative reconstruction, and/or weight based dosing when appropriate to reduce radiation dose to as low as reasonably achievable. FINDINGS: Bifrontal hemorrhagic contusions measuring up to approximately 2 cm on the right, mild predominantly superolateral subarachnoid hemorrhage and up to 3 mm small left frontal subdural hematomas laterally and along the left side of the anterior falx appear unchanged There is no midline shift, hydrocephalus, or other significant changes identified. The mastoid air cells and visualized paranasal sinuses are essentially clear. STABLE ACUTE INTRACRANIAL HEMORRHAGES FROM YESTERDAY. NO EVIDENCE OF INTERVAL COMPLICATION IDENTIFIED.      Ct Head Wo Contrast    Result Date: 4/8/2021  CT HEAD WO CONTRAST CLINICAL HISTORY: Bifrontal subdural hematoma with subarachnoid hemorrhage., Follow-up. Comparison: April 7, 2021 1838 hours TECHNIQUE: Multiple unenhanced serial axial images of the brain from the vertex of the skull to the base of the skull were performed. FINDINGS: The ventricles are dilated. Unchanged size configuration. Mo.  No mass. No midline shift. The cisterns are patent. There is developing area of hemorrhagic parenchymal contusion involving the left posterior inferior aspect of the parietal lobe. There is small subarachnoid hemorrhage noted. Again note is made of bifrontal subdural hematomas with extension of the subdural hematoma into the interhemispheric fissure. The left subdural hematoma extends overlying the left frontal lobe to the level of the posterior aspect of the left frontal lobe. There is associated bifrontal hemorrhagic contusions. The multiple parenchymal contusions are best appreciated on the coronal reconstructions series 601 image 34. There is been some interval enlargement of the right as compared to the prior examination. There is now some increasing surrounding vasogenic edema There are now some scattered areas increased attenuation seen within the base of the left frontal lobe series 2 image 15 and small foci within the superior aspect of the left frontal lobe, series 2 image 2523. Either represent areas of hemorrhage. The visualized osseous structures are unremarkable. The visualized portion of the paranasal sinuses, and mastoids are unremarkable. IMPRESSION 1. INTERVAL DEVELOPMENT OF ACUTE HEMORRHAGIC PARENCHYMAL CONTUSIONS WITH SMALL SUBARACHNOID COMPONENTS IN THE LEFT POSTERIOR INFERIOR PARIETAL REGION. 2. THERE IS SMALL AREAS OF NEW INCREASED ATTENUATION DESCRIBED ABOVE WITHIN THE LEFT FRONTAL LOBE. FINDINGS REPRESENT SMALL AREAS OF PARENCHYMAL CONTUSION, HEMORRHAGE. 3. BIFRONTAL SUBDURAL HEMATOMAS WITH ASSOCIATED PARENCHYMAL HEMORRHAGIC CONTUSIONS AS DESCRIBED ABOVE.  RECOMMEND MRI TO adenopathy. Other findings: Normal age-related bony changes. Extreme motion artifact. No definite fracture. EXAMINATION: CT CERVICAL SPINE WO CONTRAST  DATE AND TIME:4/7/2021 6:44 PM CLINICAL HISTORY: Severe headache.  trauma  COMPARISON: None TECHNIQUE:Axial CT from skull base to vertex without  contrast..  All CT scans at this facility use dose modulation, iterative reconstruction, and/or weight based dosing when appropriate to reduce radiation dose to as low as reasonably achievable. FINDINGS. Bilateral frontal areas of hemorrhagic contusion with bifrontal frontal subarachnoid and subdural hemorrhage. Small subdural hemorrhages extend along the inner calvarium bilaterally extending approximately 4 mm deep to the inner calvarium. There is no midline shift. Small amount of subdural blood along the anterior falx. Small focus of hemorrhagic contusion along the left anterior temporal lobe. No intraventricular blood. No significant parenchymal edema demonstrated at this time. No other areas of acute hemorrhage. 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 . Globes intact. No acute fracture. Paranasal sinuses and mastoids are clear. These findings were discussed with the ER doctor IMPRESSION: BIFRONTAL HEMORRHAGIC CONTUSIONS WITH SMALL BILATERAL SUBDURAL HEMATOMAS. NO ACUTE FRACTURE       BP (!) 163/112   Pulse 82   Temp 99.9 °F (37.7 °C) (Oral)   Resp 16   Ht 6' 1\" (1.854 m)   Wt 137 lb 9.1 oz (62.4 kg)   SpO2 96%   BMI 18.15 kg/m²     More lethargic today. Wife by the bedside. No verbal response today awake less attentive pupils equal 3 mm midline. Spontaneous movement bilateral upper extremities more repetitive left hand movement. GCS 4 +1 +5 total of 10. CBC unremarkable. Sodium normal    Tachypnea is noted. CT today shows overall unchanged bifrontal subdural intraparenchymal hematoma. Small left the hygroma is noted    Status post traumatic intracranial hematoma.     Overall CAT scans unremarkable. Would rule out any pneumonia or urinary tract infection. We will continue to follow.

## 2021-04-11 NOTE — PROGRESS NOTES
Subjective: The patient complains of ,\" moderate acute on chronic progressive fatigue and   Weakness with acute head injury and bleed bifrontal SAH   partially relieved by rest,medications, Pt, OT, and rest and exacerbated by recent illness. I am concerned about patients medical complexities including SCI for 30 yeas wc mobility  . I reviewed current care and plans for further care with other rehab providers including nursing and case management. According to recent nursing note, \"   \".    ROS x10: The patient also complains of severely impaired mobility and activities of daily living. Otherwise no new problems with vision, hearing, nose, mouth, throat, dermal, cardiovascular, GI, , pulmonary, musculoskeletal, psychiatric or neurological. See Rehab H&P on Rehab chart dated . Vital signs:  BP (!) 140/95   Pulse 86   Temp 99.9 °F (37.7 °C) (Oral)   Resp 20   Ht 6' 1\" (1.854 m)   Wt 137 lb 9.1 oz (62.4 kg)   SpO2 95%   BMI 18.15 kg/m²   I/O:   PO/Intake:  fair PO intake, no problems observed or reported. Bowel/Bladder:  continent, no problems noted. General:  Patient is well developed, adequately nourished, non-obese and     well kempt. HEENT:    PERRLA, hearing intact to loud voice, external inspection of ear     and nose benign. Inspection of lips, tongue and gums benign  Musculoskeletal: No significant change in strength or tone. All joints stable. Inspection and palpation of digits and nails show no clubbing,       cyanosis or inflammatory conditions. Neuro/Psychiatric: Affect: flat but pleasant. Alert and oriented to person, place and     Situation with    cues. No significant change in deep tendon reflexes or     sensation  Lungs:  Diminished, CTA-B. Respiration effort is normal at rest.     Heart:   S1 = S2, RRR. No loud murmurs. Abdomen:  Soft, non-tender, no enlargement of liver or spleen.   Extremities:  No significant lower extremity edema or tenderness. Skin:   Intact to general survey, no visualized or palpated problems. Rehabilitation:  Physical therapy:   Bed Mobility:      Transfers:  ,  ,      FIMS:  ,  ,      Occupational therapy:    ,  ,      Speech therapy:        Lab/X-ray studies reviewed, analyzed and discussed with patient and staff:   Recent Results (from the past 24 hour(s))   Basic Metabolic Panel w/ Reflex to MG    Collection Time: 04/11/21  4:56 AM   Result Value Ref Range    Sodium 145 (H) 135 - 144 mEq/L    Potassium reflex Magnesium 3.1 (L) 3.4 - 4.9 mEq/L    Chloride 110 (H) 95 - 107 mEq/L    CO2 21 20 - 31 mEq/L    Anion Gap 14 9 - 15 mEq/L    Glucose 98 70 - 99 mg/dL    BUN 12 6 - 20 mg/dL    CREATININE 0.30 (L) 0.70 - 1.20 mg/dL    GFR Non-African American >60.0 >60    GFR  >60.0 >60    Calcium 8.3 (L) 8.5 - 9.9 mg/dL   CBC    Collection Time: 04/11/21  4:56 AM   Result Value Ref Range    WBC 7.8 4.8 - 10.8 K/uL    RBC 3.87 (L) 4.70 - 6.10 M/uL    Hemoglobin 11.7 (L) 14.0 - 18.0 g/dL    Hematocrit 34.5 (L) 42.0 - 52.0 %    MCV 89.2 80.0 - 100.0 fL    MCH 30.3 27.0 - 31.3 pg    MCHC 34.0 33.0 - 37.0 %    RDW 13.5 11.5 - 14.5 %    Platelets 587 591 - 884 K/uL   Magnesium    Collection Time: 04/11/21  4:56 AM   Result Value Ref Range    Magnesium 1.8 1.7 - 2.4 mg/dL   POCT Glucose    Collection Time: 04/11/21  8:09 AM   Result Value Ref Range    POC Glucose 108 60 - 115 mg/dl    Performed on ACCU-CHEK    POCT Glucose    Collection Time: 04/11/21 11:47 AM   Result Value Ref Range    POC Glucose 142 (H) 60 - 115 mg/dl    Performed on ACCU-CHEK        Xr Elbow Right (min 3 Views)    Result Date: 4/7/2021  COMPARISON: August 30, 2016 HISTORY:    fall PATIENT NAME: GUERRERO BEE KASEYT: TECHNIQUE: XR ELBOW RIGHT (MIN 3 VIEWS) FINDINGS: The joint spaces are maintained. Swelling is seen over the dorsum and medial aspect of the elbow. A small calcification is again seen on the dorsal aspect of the elbow near the olecranon.  No The mastoid air cells and visualized paranasal sinuses are essentially clear. STABLE ACUTE INTRACRANIAL HEMORRHAGES FROM YESTERDAY. NO EVIDENCE OF INTERVAL COMPLICATION IDENTIFIED. Ct Head Wo Contrast    Result Date: 4/8/2021  CT HEAD WO CONTRAST CLINICAL HISTORY: Bifrontal subdural hematoma with subarachnoid hemorrhage., Follow-up. Comparison: April 7, 2021 1838 hours TECHNIQUE: Multiple unenhanced serial axial images of the brain from the vertex of the skull to the base of the skull were performed. FINDINGS: The ventricles are dilated. Unchanged size configuration. Mo.  No mass. No midline shift. The cisterns are patent. There is developing area of hemorrhagic parenchymal contusion involving the left posterior inferior aspect of the parietal lobe. There is small subarachnoid hemorrhage noted. Again note is made of bifrontal subdural hematomas with extension of the subdural hematoma into the interhemispheric fissure. The left subdural hematoma extends overlying the left frontal lobe to the level of the posterior aspect of the left frontal lobe. There is associated bifrontal hemorrhagic contusions. The multiple parenchymal contusions are best appreciated on the coronal reconstructions series 601 image 34. There is been some interval enlargement of the right as compared to the prior examination. There is now some increasing surrounding vasogenic edema There are now some scattered areas increased attenuation seen within the base of the left frontal lobe series 2 image 15 and small foci within the superior aspect of the left frontal lobe, series 2 image 2523. Either represent areas of hemorrhage. The visualized osseous structures are unremarkable. The visualized portion of the paranasal sinuses, and mastoids are unremarkable. IMPRESSION 1. INTERVAL DEVELOPMENT OF ACUTE HEMORRHAGIC PARENCHYMAL CONTUSIONS WITH SMALL SUBARACHNOID COMPONENTS IN THE LEFT POSTERIOR INFERIOR PARIETAL REGION.  2. THERE IS SMALL AREAS inaccuracy Fracture:Extreme motion artifact motion artifact. No obvious fracture. Consider plain film radiography the C-spine if clinical concerns persist Dense atlas:Dens atlas relationship is unremarkable. Soft tissues:Airway patent. No soft tisssue mass or adenopathy. Other findings: Normal age-related bony changes. Extreme motion artifact. No definite fracture. EXAMINATION: CT CERVICAL SPINE WO CONTRAST  DATE AND TIME:4/7/2021 6:44 PM CLINICAL HISTORY: Severe headache.  trauma  COMPARISON: None TECHNIQUE:Axial CT from skull base to vertex without  contrast..  All CT scans at this facility use dose modulation, iterative reconstruction, and/or weight based dosing when appropriate to reduce radiation dose to as low as reasonably achievable. FINDINGS. Bilateral frontal areas of hemorrhagic contusion with bifrontal frontal subarachnoid and subdural hemorrhage. Small subdural hemorrhages extend along the inner calvarium bilaterally extending approximately 4 mm deep to the inner calvarium. There is no midline shift. Small amount of subdural blood along the anterior falx. Small focus of hemorrhagic contusion along the left anterior temporal lobe. No intraventricular blood. No significant parenchymal edema demonstrated at this time. No other areas of acute hemorrhage. 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 . Globes intact. No acute fracture. Paranasal sinuses and mastoids are clear. These findings were discussed with the ER doctor IMPRESSION: BIFRONTAL HEMORRHAGIC CONTUSIONS WITH SMALL BILATERAL SUBDURAL HEMATOMAS. NO ACUTE FRACTURE        Previous extensive, complex labs, notes and diagnostics reviewed and analyzed. ALLERGIES:    Allergies as of 04/07/2021 - Review Complete 04/07/2021   Allergen Reaction Noted    Vicodin [hydrocodone-acetaminophen] Nausea Only 05/18/2012      (please also verify by checking [facility])     Complex Physical Medicine & Rehab Issues Assess & Plan:   1.  Severe abnormality

## 2021-04-11 NOTE — PROGRESS NOTES
TRAUMA DAILY PROGRESS NOTE      Patient Name: Naida Trinidad  Admission Date 4/7/2021    Hospital Day: 4  Patient seen and examined on 4/11/2021    INTERVAL HISTORY/EVENTS     Background:  Cynthia Luna is a 62 y. o. male with a PMHx of paraplegia presented as a transfer from [facility] on 4/7/21 after he was found to have bifrontal subarachnoid hemorrhage. Per reports patient was found down at his home by a [employer] . It was suspected that he feel backwards out of his wheelchair. Upon arrival to [facility] ED, pt noted to be behavioral, impulsive, and altered from baseline mentation. A&Ox1 (self), pulling at line, and physically/verbally combative with staff requiring Ativan and soft restraints. Per wife, his baseline mentation is A&Ox4 and he holds a job with [employer]. He was admitted to the Trauma Service with Neurosurgery (Dr. Vicente Liu) consulted.  [facility] Course:  4/7/2021: Found to have bifrontal SAH at [facility] and transferred to [facility] s/ found down at home by [employer] . AMS and hyponatremia noted upon arrival. NSGY consulted. 4/8/2021: Precedex gtt started for impulsivity and safety. Remains A&Ox0, GCS 2/3/5. [address] with interval development of bifrontal SAH and new area of SAH at left parietal region. 4/9/2021: Repeat CT head completed-stable. Precedex gtt weaned off but had to be resumed in the evening due to agitation/restlessness. 4/10/2021: Improving mentation. Precedex infusion discontinued. Transferred to the [facility]    24 Hour Events:  No acute events overnight per nursing. Patient with waxing and waning mental status overnight and this AM.  Patient has not been taking much PO due to mental status. Wife at bedside this morning reports that he was able to recognize her but then shortly after was not able to. Patient denies any pain this morning. Denies chest pain, shortness of breath or abdominal pain. Labs reviewed. No leukocytosis. H and H acceptable. Platelets stable.   Sodium 145 this AM (139). Potassium 3.1-replacement ordered. BUN and creatinine remain stable. Intake: 1899 mL   IVF: 1399 mL   PO: 500 mL    Output: 2450 mL   UOP: 2450 mL   BM x0 occurrenes. No documented BM since admit on 2021      PHYSICAL EXAM     Vitals Average, Min and Max for last 24 hours:  Temp: Temp: 99.9 °F (37.7 °C); Temp  Av.4 °F (36.9 °C)  Min: 97.2 °F (36.2 °C)  Max: 99.9 °F (37.7 °C)  Respirations: Resp  Av  Min: 18  Max: 20  Pulse: Pulse  Av.3  Min: 84  Max: 96  Blood Pressure: Systolic (99OUB), CSL:714 , Min:137 , JQU:301   ; Diastolic (22TBT), OMN:75, Min:77, Max:100    SpO2: SpO2  Av.5 %  Min: 92 %  Max: 98 %    24hr Intake/Output:     Intake/Output Summary (Last 24 hours) at 2021 1536  Last data filed at 2021 0819  Gross per 24 hour   Intake --   Output 1900 ml   Net -1900 ml       Vitals: BP (!) 140/95   Pulse 86   Temp 99.9 °F (37.7 °C) (Oral)   Resp 20   Ht 6' 1\" (1.854 m)   Wt 137 lb 9.1 oz (62.4 kg)   SpO2 95%   BMI 18.15 kg/m²     Physical Exam:    Constitutional: Lying in bed, HOB elevated. Sleepy but arrousable to verbal stimuli. Appears comfortable, in NAD. HEENT: Atraumatic normocephalic. Cardiovascular: Regular rate and rhythm. Palpable radial and DP pulses bilaterally. Pulmonary: Clear to ausculation bilaterally. No wheezing, rhonchi or rales. Breathing comfortably on room air. Abdominal: Soft. Non-distended. Non-tender. : Indwelling toscano cathter in place (chronic) , draining clear, yellow urine. Musculoskeletal: Moves BUE spontaneously, no motor in BLE secondary to prior SCI. No peripheral edema appreciated. Neurological: Sleepy, arouses to verbal stimuli, orientated to self only. Face symmetric. OD 4 mm and brisk OS 6mm and brisk. EOM grossly intact but limited participation in testing. Gross motor in BUE. No motor in BLE secondary to prior SCI. Sensation intact to BUE. Skin: Warm and dry.      LABORATORY RESULTS (LAST 24 HOURS)     CBC with Differential:    Lab Results   Component Value Date    WBC 7.8 04/11/2021    RBC 3.87 04/11/2021    HGB 11.7 04/11/2021    HCT 34.5 04/11/2021     04/11/2021    MCV 89.2 04/11/2021    MCH 30.3 04/11/2021    MCHC 34.0 04/11/2021    RDW 13.5 04/11/2021    LYMPHOPCT 5.4 04/08/2021    MONOPCT 9.3 04/08/2021    BASOPCT 0.1 04/08/2021    MONOSABS 0.4 04/08/2021    LYMPHSABS 0.3 04/08/2021    EOSABS 0.0 04/08/2021    BASOSABS 0.0 04/08/2021     CMP:    Lab Results   Component Value Date     04/11/2021    K 3.1 04/11/2021     04/11/2021    CO2 21 04/11/2021    BUN 12 04/11/2021    CREATININE 0.30 04/11/2021    GFRAA >60.0 04/11/2021    LABGLOM >60.0 04/11/2021    GLUCOSE 98 04/11/2021    PROT 7.0 04/07/2021    LABALBU 4.5 04/07/2021    CALCIUM 8.3 04/11/2021    BILITOT 0.6 04/07/2021    ALKPHOS 76 04/07/2021    AST 22 04/07/2021    ALT 23 04/07/2021     Magnesium:    Lab Results   Component Value Date    MG 1.8 04/11/2021     PT/INR:    Lab Results   Component Value Date    PROTIME 13.8 04/07/2021    INR 1.1 04/07/2021     PTT:  No results found for: APTT, PTT    IMAGING RESULTS (PERSONALLY REVIEWED)     CT head without contrast: A small predominantly low-density left frontoparietal subdural hematoma/hygroma has mildly increased measuring up to 5 mm on the coronal reconstructions. There is no significant midline shift, hydrocephalus, or other significant interval change identified. Bifrontal hemorrhagic contusions anteriorly measuring up to 2 cm on the right and mild subarachnoid hemorrhage appear unchanged. A nondisplaced occipital fracture extending to the foramen magnum to the right of midline is again noted. ASSESSMENT & PLAN     Diagnoses:  1. S/p found down at home on 4/7/21  2. Bifrontal subarachnoid hemorrhage (NSGY, non-op)  3. Hyponatremia (resolved)  4. Acute metabolic encephalopathy  5.  Left parietal subarachnoid hemorrhage (seen on repeat CT Head on 4/8/21, NSGY, non-op)  6. Hypokalemia   7. Neurogenic bladder  8. Occipital skull fracture extending to the foramen magnum     PMHx: Seizure disorder, chronic pain     Incidental Findings: None (Reviewed by JLR, 4/8/21)        ASSESSMENT/PLAN:  Neurological:   Bifrontal subarachnoid hemorrhage, altered mental status 2/2 TBI and hyponatremia. Given 500cc bolus of Keppra at [facility] prior to transfer. rCTH 4/8/21 with interval development of bifrontal contusions and new area of SAH at left parietal region as well as bifrontal SDH. Per wife, patient with chronic back pain and worsened at baseline when in supine position. Occipital skull fracture extending to the foramen magnum on repeat CTH this AM.  - NSGY consulted, appreciate recs: continue with keppra, change to PO when able, Q2 neuro checks  - Continue Q2 neuro checks today, pending NSGY recommendations  - Continue IV Keppra 500mg BID-transition to PO when patient more awake  - Continue seizure precautions  - Discontinue precedex infusion today and monitor mental status/agitation  - Will hold off on formal SLP until improved mentation, will re-evaluate for appropriateness of SLP evaluation later today  - Continue PO Tylenol 650mg q 6hrs prn mild/moderate pain  - CT head obtained due to pupillary changes and change in mentation-occipital skull fracture appreciated extending to the foramen magnum. Plan for CTA neck to evaluate for BCVI.      Cardiovascular:   No acute cardiovascular issues. BP's have been acceptable with no tachycardia appreciated. - Continue IV Hydralazine and Labetalol PRN SBP >160mmHg     Respiratory:   No acute respiratory issues. Acceptable O2 saturations on room air.   - Maintain O2 sat >92% on room air  - Continue pulmonary toilet and encourage IS.     GI/Diet:  Bedside swallow completed yesterday by SLP with diet recommendations.  No documented BM since admit on 4/7/2021  - Continue puree diet with thin liquids  - Continue bowel regimen   - Start PRN dulcolax suppository     Renal/Electrolytes:   Hyponatremia resolved, hypokalemic this AM despite electrolyte replacement yesterday. Chronic indwelling toscano catheter for neurogenic bladder in the setting of prior spinal cord injury.    -Resume NS @ 50cc/hr in setting of poor PO intake  - Replete electrolytes PRN  - Repeat BMP/Mag in AM   - Maintain indwelling toscano catheter for history of neurogenic bladder     ID:   No active infection. Remains afebrile, normotensive, and without leukocytosis. Leukopenia resolved from admission CBC     - No indication for Abx  - Repeat CBC in AM to evaluate for resolution of leukopenia     Heme:   No acute hematologic issues. H and H remains acceptable. - No indication for transfusion.  - Repeat CBC in AM to trend H and H.      Endocrine:   No acute glycemic/endocrine issues.     MSK:   PMHx of paraplegia. - Activity: OOB to chair TID  - Spines: Clear  - Weight bearing restrictions: None  - PT/OT: Will hold off on formal consult until improved MS, anticipate today vs tomorrow     Tubes/Lines/Drains:  -Maintain PIVs  -Maintain toscano catheter. Patient has chronic, indwelling toscano catheter that he presented to hospital with, for history of neurogenic bladder in the setting of spinal cord injury.      Prophylaxis:   - SCDs only, No chemo PPx given bleed risk and most recent CT head only showed stability on 4/6/2021  - No GI PPx indicated     Dispo: Remain on trauma Service (General Status) for continued monitoring of neurological status. Continue Q4 neuro checks.   Disposition pending improvement in mental status.         Follow up with Neurosurgery (Dr. Aguilar Bertrand) in TBD  No Trauma follow up needed        Please call for any questions or concerns.        [address] ([id number])  [phone]     This patient's plan of care was discussed and made in collaboration with Trauma Attending physician, Michoacano Rios MD.

## 2021-04-12 ENCOUNTER — APPOINTMENT (OUTPATIENT)
Dept: GENERAL RADIOLOGY | Age: 61
DRG: 085 | End: 2021-04-12
Payer: COMMERCIAL

## 2021-04-12 PROBLEM — J69.0 ASPIRATION PNEUMONIA (HCC): Status: ACTIVE | Noted: 2021-04-12

## 2021-04-12 PROBLEM — E83.42 HYPOMAGNESEMIA: Status: ACTIVE | Noted: 2021-04-12

## 2021-04-12 PROBLEM — R13.12 DYSPHAGIA, OROPHARYNGEAL PHASE: Status: ACTIVE | Noted: 2021-04-12

## 2021-04-12 PROBLEM — J18.9 RIGHT LOWER LOBE PNEUMONIA: Status: ACTIVE | Noted: 2021-04-12

## 2021-04-12 PROBLEM — N30.01 ACUTE CYSTITIS WITH HEMATURIA: Status: ACTIVE | Noted: 2021-04-12

## 2021-04-12 PROBLEM — D72.829 LEUKOCYTOSIS: Status: ACTIVE | Noted: 2021-04-12

## 2021-04-12 LAB
ACANTHOCYTES: ABNORMAL
ANION GAP SERPL CALCULATED.3IONS-SCNC: 10 MEQ/L (ref 9–15)
ANION GAP SERPL CALCULATED.3IONS-SCNC: 7 MEQ/L (ref 9–15)
BANDED NEUTROPHILS RELATIVE PERCENT: 20 %
BASOPHILS ABSOLUTE: 0 K/UL (ref 0–0.2)
BASOPHILS RELATIVE PERCENT: 0.4 %
BUN BLDV-MCNC: 7 MG/DL (ref 6–20)
BUN BLDV-MCNC: 9 MG/DL (ref 6–20)
CALCIUM SERPL-MCNC: 7.8 MG/DL (ref 8.5–9.9)
CALCIUM SERPL-MCNC: 7.9 MG/DL (ref 8.5–9.9)
CHLORIDE BLD-SCNC: 102 MEQ/L (ref 95–107)
CHLORIDE BLD-SCNC: 105 MEQ/L (ref 95–107)
CO2: 25 MEQ/L (ref 20–31)
CO2: 27 MEQ/L (ref 20–31)
CREAT SERPL-MCNC: 0.29 MG/DL (ref 0.7–1.2)
CREAT SERPL-MCNC: 0.32 MG/DL (ref 0.7–1.2)
EOSINOPHILS ABSOLUTE: 0 K/UL (ref 0–0.7)
EOSINOPHILS RELATIVE PERCENT: 0 %
GFR AFRICAN AMERICAN: >60
GFR AFRICAN AMERICAN: >60
GFR NON-AFRICAN AMERICAN: >60
GFR NON-AFRICAN AMERICAN: >60
GLUCOSE BLD-MCNC: 116 MG/DL (ref 70–99)
GLUCOSE BLD-MCNC: 117 MG/DL (ref 70–99)
HCT VFR BLD CALC: 38.4 % (ref 42–52)
HEMOGLOBIN: 13.1 G/DL (ref 14–18)
LYMPHOCYTES ABSOLUTE: 0 K/UL (ref 1–4.8)
LYMPHOCYTES RELATIVE PERCENT: 1.5 %
MAGNESIUM: 1.6 MG/DL (ref 1.7–2.4)
MCH RBC QN AUTO: 30.2 PG (ref 27–31.3)
MCHC RBC AUTO-ENTMCNC: 34.2 % (ref 33–37)
MCV RBC AUTO: 88.4 FL (ref 80–100)
METAMYELOCYTES RELATIVE PERCENT: 3 %
MONOCYTES ABSOLUTE: 0 K/UL (ref 0.2–0.8)
MONOCYTES RELATIVE PERCENT: 3.3 %
NEUTROPHILS ABSOLUTE: 15.2 K/UL (ref 1.4–6.5)
NEUTROPHILS RELATIVE PERCENT: 78 %
PDW BLD-RTO: 13.8 % (ref 11.5–14.5)
PLATELET # BLD: 199 K/UL (ref 130–400)
PLATELET SLIDE REVIEW: NORMAL
POIKILOCYTES: ABNORMAL
POTASSIUM REFLEX MAGNESIUM: 2.6 MEQ/L (ref 3.4–4.9)
POTASSIUM SERPL-SCNC: 3 MEQ/L (ref 3.4–4.9)
RBC # BLD: 4.34 M/UL (ref 4.7–6.1)
SODIUM BLD-SCNC: 136 MEQ/L (ref 135–144)
SODIUM BLD-SCNC: 140 MEQ/L (ref 135–144)
WBC # BLD: 15 K/UL (ref 4.8–10.8)

## 2021-04-12 PROCEDURE — 36415 COLL VENOUS BLD VENIPUNCTURE: CPT

## 2021-04-12 PROCEDURE — 80048 BASIC METABOLIC PNL TOTAL CA: CPT

## 2021-04-12 PROCEDURE — 2580000003 HC RX 258: Performed by: PHYSICIAN ASSISTANT

## 2021-04-12 PROCEDURE — 87081 CULTURE SCREEN ONLY: CPT

## 2021-04-12 PROCEDURE — 94761 N-INVAS EAR/PLS OXIMETRY MLT: CPT

## 2021-04-12 PROCEDURE — 6360000002 HC RX W HCPCS: Performed by: PHYSICAL MEDICINE & REHABILITATION

## 2021-04-12 PROCEDURE — APPSS30 APP SPLIT SHARED TIME 16-30 MINUTES: Performed by: NURSE PRACTITIONER

## 2021-04-12 PROCEDURE — 6360000002 HC RX W HCPCS: Performed by: NURSE PRACTITIONER

## 2021-04-12 PROCEDURE — 6360000002 HC RX W HCPCS: Performed by: NEUROLOGICAL SURGERY

## 2021-04-12 PROCEDURE — 1210000000 HC MED SURG R&B

## 2021-04-12 PROCEDURE — 85025 COMPLETE CBC W/AUTO DIFF WBC: CPT

## 2021-04-12 PROCEDURE — 92507 TX SP LANG VOICE COMM INDIV: CPT

## 2021-04-12 PROCEDURE — 94667 MNPJ CHEST WALL 1ST: CPT

## 2021-04-12 PROCEDURE — 6370000000 HC RX 637 (ALT 250 FOR IP): Performed by: PHYSICAL MEDICINE & REHABILITATION

## 2021-04-12 PROCEDURE — 92526 ORAL FUNCTION THERAPY: CPT

## 2021-04-12 PROCEDURE — 71045 X-RAY EXAM CHEST 1 VIEW: CPT

## 2021-04-12 PROCEDURE — 2580000003 HC RX 258: Performed by: PHYSICAL MEDICINE & REHABILITATION

## 2021-04-12 PROCEDURE — 2580000003 HC RX 258: Performed by: NURSE PRACTITIONER

## 2021-04-12 PROCEDURE — 99232 SBSQ HOSP IP/OBS MODERATE 35: CPT | Performed by: PHYSICAL MEDICINE & REHABILITATION

## 2021-04-12 PROCEDURE — 83735 ASSAY OF MAGNESIUM: CPT

## 2021-04-12 PROCEDURE — 94664 DEMO&/EVAL PT USE INHALER: CPT

## 2021-04-12 PROCEDURE — 94640 AIRWAY INHALATION TREATMENT: CPT

## 2021-04-12 PROCEDURE — 94668 MNPJ CHEST WALL SBSQ: CPT

## 2021-04-12 PROCEDURE — 2580000003 HC RX 258: Performed by: NEUROLOGICAL SURGERY

## 2021-04-12 RX ORDER — ALBUTEROL SULFATE 2.5 MG/3ML
2.5 SOLUTION RESPIRATORY (INHALATION) EVERY 6 HOURS
Status: DISCONTINUED | OUTPATIENT
Start: 2021-04-12 | End: 2021-04-15 | Stop reason: HOSPADM

## 2021-04-12 RX ORDER — ALBUTEROL SULFATE 2.5 MG/3ML
2.5 SOLUTION RESPIRATORY (INHALATION)
Status: DISCONTINUED | OUTPATIENT
Start: 2021-04-12 | End: 2021-04-12

## 2021-04-12 RX ORDER — ALBUTEROL SULFATE 2.5 MG/3ML
2.5 SOLUTION RESPIRATORY (INHALATION) 2 TIMES DAILY
Status: DISCONTINUED | OUTPATIENT
Start: 2021-04-12 | End: 2021-04-12

## 2021-04-12 RX ORDER — POTASSIUM CHLORIDE 7.45 MG/ML
10 INJECTION INTRAVENOUS
Status: COMPLETED | OUTPATIENT
Start: 2021-04-12 | End: 2021-04-12

## 2021-04-12 RX ORDER — SODIUM CHLORIDE AND POTASSIUM CHLORIDE .9; .15 G/100ML; G/100ML
SOLUTION INTRAVENOUS CONTINUOUS
Status: DISCONTINUED | OUTPATIENT
Start: 2021-04-12 | End: 2021-04-15

## 2021-04-12 RX ORDER — LABETALOL HYDROCHLORIDE 5 MG/ML
10 INJECTION, SOLUTION INTRAVENOUS EVERY 4 HOURS PRN
Status: DISCONTINUED | OUTPATIENT
Start: 2021-04-12 | End: 2021-04-15 | Stop reason: HOSPADM

## 2021-04-12 RX ORDER — HYDRALAZINE HYDROCHLORIDE 20 MG/ML
10 INJECTION INTRAMUSCULAR; INTRAVENOUS EVERY 4 HOURS PRN
Status: DISCONTINUED | OUTPATIENT
Start: 2021-04-12 | End: 2021-04-15 | Stop reason: HOSPADM

## 2021-04-12 RX ADMIN — SODIUM CHLORIDE: 9 INJECTION, SOLUTION INTRAVENOUS at 08:23

## 2021-04-12 RX ADMIN — GLYCERIN 2 G: 2 SUPPOSITORY RECTAL at 18:51

## 2021-04-12 RX ADMIN — POTASSIUM CHLORIDE 10 MEQ: 7.46 INJECTION, SOLUTION INTRAVENOUS at 18:35

## 2021-04-12 RX ADMIN — HYDRALAZINE HYDROCHLORIDE 10 MG: 20 INJECTION INTRAMUSCULAR; INTRAVENOUS at 08:26

## 2021-04-12 RX ADMIN — POTASSIUM CHLORIDE 10 MEQ: 7.46 INJECTION, SOLUTION INTRAVENOUS at 08:23

## 2021-04-12 RX ADMIN — CEFEPIME 1000 MG: 1 INJECTION, POWDER, FOR SOLUTION INTRAMUSCULAR; INTRAVENOUS at 20:52

## 2021-04-12 RX ADMIN — ALBUTEROL SULFATE 2.5 MG: 2.5 SOLUTION RESPIRATORY (INHALATION) at 12:24

## 2021-04-12 RX ADMIN — Medication 10 ML: at 20:53

## 2021-04-12 RX ADMIN — POTASSIUM CHLORIDE AND SODIUM CHLORIDE: 900; 150 INJECTION, SOLUTION INTRAVENOUS at 18:35

## 2021-04-12 RX ADMIN — POTASSIUM CHLORIDE 10 MEQ: 7.46 INJECTION, SOLUTION INTRAVENOUS at 10:00

## 2021-04-12 RX ADMIN — VANCOMYCIN HYDROCHLORIDE 1500 MG: 1 INJECTION, POWDER, LYOPHILIZED, FOR SOLUTION INTRAVENOUS at 11:32

## 2021-04-12 RX ADMIN — POTASSIUM CHLORIDE 10 MEQ: 7.46 INJECTION, SOLUTION INTRAVENOUS at 23:51

## 2021-04-12 RX ADMIN — POTASSIUM CHLORIDE 10 MEQ: 7.46 INJECTION, SOLUTION INTRAVENOUS at 20:52

## 2021-04-12 RX ADMIN — Medication 10 ML: at 08:24

## 2021-04-12 RX ADMIN — VANCOMYCIN HYDROCHLORIDE 1250 MG: 5 INJECTION, POWDER, LYOPHILIZED, FOR SOLUTION INTRAVENOUS at 23:50

## 2021-04-12 RX ADMIN — BISACODYL 10 MG: 10 SUPPOSITORY RECTAL at 20:55

## 2021-04-12 RX ADMIN — ALBUTEROL SULFATE 2.5 MG: 2.5 SOLUTION RESPIRATORY (INHALATION) at 07:26

## 2021-04-12 RX ADMIN — POTASSIUM CHLORIDE 10 MEQ: 7.46 INJECTION, SOLUTION INTRAVENOUS at 10:01

## 2021-04-12 RX ADMIN — LEVETIRACETAM 500 MG: 100 INJECTION, SOLUTION INTRAVENOUS at 12:37

## 2021-04-12 RX ADMIN — ALBUTEROL SULFATE 2.5 MG: 2.5 SOLUTION RESPIRATORY (INHALATION) at 19:56

## 2021-04-12 RX ADMIN — POTASSIUM CHLORIDE 10 MEQ: 7.46 INJECTION, SOLUTION INTRAVENOUS at 11:24

## 2021-04-12 RX ADMIN — CEFEPIME 1000 MG: 1 INJECTION, POWDER, FOR SOLUTION INTRAMUSCULAR; INTRAVENOUS at 10:52

## 2021-04-12 RX ADMIN — POTASSIUM CHLORIDE 10 MEQ: 7.46 INJECTION, SOLUTION INTRAVENOUS at 19:46

## 2021-04-12 SDOH — HEALTH STABILITY: PHYSICAL HEALTH: ON AVERAGE, HOW MANY DAYS PER WEEK DO YOU ENGAGE IN MODERATE TO STRENUOUS EXERCISE (LIKE A BRISK WALK)?: 0 DAYS

## 2021-04-12 SDOH — HEALTH STABILITY: MENTAL HEALTH
STRESS IS WHEN SOMEONE FEELS TENSE, NERVOUS, ANXIOUS, OR CAN'T SLEEP AT NIGHT BECAUSE THEIR MIND IS TROUBLED. HOW STRESSED ARE YOU?: ONLY A LITTLE

## 2021-04-12 ASSESSMENT — PAIN SCALES - GENERAL
PAINLEVEL_OUTOF10: 4
PAINLEVEL_OUTOF10: 6

## 2021-04-12 NOTE — PROGRESS NOTES
Patient: Ronit Kellogg  Unit/Bed: Q404/W450-15  YOB: 1960  MRN: [id number] Acct: [de-identified]   Admitting Diagnosis: [facility] ([facility]ach[facility] ([facility] [I60.9]  Admit Date:  4/7/2021  Hospital Day: 5    Current Medications:    Scheduled Meds:   albuterol  2.5 mg Nebulization Q6H    vancomycin  1,250 mg Intravenous Q12H    cefepime  1,000 mg Intravenous Q12H    vancomycin  1,500 mg Intravenous Once    vancomycin (VANCOCIN) intermittent dosing (placeholder)   Other RX Placeholder    bisacodyl  10 mg Rectal Every Other Day    glycerin (ADULT)  1 suppository Rectal Every Other Day    docusate sodium  100 mg Oral BID    senna  1 tablet Oral Nightly    levetiracetam  500 mg Intravenous Q12H    sodium chloride flush  10 mL Intravenous 2 times per day     Continuous Infusions:   sodium chloride 75 mL/hr at 04/12/21 1003    sodium chloride       PRN Meds:.hydrALAZINE, labetalol, bisacodyl, sodium chloride flush, sodium chloride, promethazine **OR** ondansetron, acetaminophen  .  sodium chloride 75 mL/hr at 04/12/21 1003    sodium chloride          Recent Labs     04/11/21  0456 04/12/21  0927   WBC 7.8 15.0*   HGB 11.7* 13.1*   HCT 34.5* 38.4*   MCV 89.2 88.4    199     Recent Labs     04/10/21  0706 04/11/21  0456 04/12/21  0449    145* 140   K 3.2* 3.1* 2.6*   * 110* 105   CO2 22 21 25   BUN 13 12 7   CREATININE 0.31* 0.30* 0.29*     No results for input(s): AST, ALT, ALB, BILIDIR, BILITOT, ALKPHOS in the last 72 hours. No results for input(s): LIPASE, AMYLASE in the last 72 hours. No results for input(s): PROT, INR in the last 72 hours. Imaging Results:    Xr Elbow Right (min 3 Views)    Result Date: 4/7/2021  COMPARISON: August 30, 2016 HISTORY:    fall PATIENT NAME: GUERRERO BEE KASEYT: TECHNIQUE: XR ELBOW RIGHT (MIN 3 VIEWS) FINDINGS: The joint spaces are maintained. Swelling is seen over the dorsum and medial aspect of the elbow.  A small calcification is again seen on the dorsal aspect of the elbow near the olecranon. No definite effusion is visualized. No acute fracture    Ct Head Wo Contrast    Result Date: 4/12/2021  EXAMINATION: CT CERVICAL SPINE WO CONTRAST   DATE AND TIME:4/7/2021 6:44 PM CLINICAL HISTORY:Acute posterior neck pain  trauma  COMPARISON: None TECHNIQUE:Helical scanning was performed from the skull base through the remainder of the cervical spine without intravenous contrast. Sagittal and coronal reformats were obtained. The lack of contrast limits CT sensitivity of the soft tissues. All CT scans at this facility use dose modulation, iterative reconstruction, and/or weight based dosing when appropriate to reduce radiation dose to as low as reasonably achievable. [facility] FINDINGS  Severe motion artifact limits exam quality inaccuracy Fracture:Extreme motion artifact motion artifact. No obvious fracture. Consider plain film radiography the C-spine if clinical concerns persist Dense atlas:Dens atlas relationship is unremarkable. Soft tissues:Airway patent. No soft tisssue mass or adenopathy. Other findings: Normal age-related bony changes. Extreme motion artifact. No definite fracture. EXAMINATION: CT CERVICAL SPINE WO CONTRAST  DATE AND TIME:4/7/2021 6:44 PM CLINICAL HISTORY: Severe headache.  trauma  COMPARISON: None TECHNIQUE:Axial CT from skull base to vertex without  contrast..  All CT scans at this facility use dose modulation, iterative reconstruction, and/or weight based dosing when appropriate to reduce radiation dose to as low as reasonably achievable. FINDINGS. Bilateral frontal areas of hemorrhagic contusion with bifrontal frontal subarachnoid and subdural hemorrhage. Small subdural hemorrhages extend along the inner calvarium bilaterally extending approximately 4 mm deep to the inner calvarium. There is no midline shift. Small amount of subdural blood along the anterior falx.  Small focus of hemorrhagic contusion along the left anterior temporal lobe. No intraventricular blood. No significant parenchymal edema demonstrated at this time. No other areas of acute hemorrhage. 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 . Globes intact. No acute fracture. Paranasal sinuses and mastoids are clear. These findings were discussed with the ER doctor IMPRESSION: BIFRONTAL HEMORRHAGIC CONTUSIONS WITH SMALL BILATERAL SUBDURAL HEMATOMAS. NO ACUTE FRACTURE     Ct Head Wo Contrast    Result Date: 4/11/2021  CT HEAD WO CONTRAST : 4/11/2021 CLINICAL HISTORY:  Change in mental status in setting of known head bleed . COMPARISON: 4/9/2021. TECHNIQUE: Spiral unenhanced images were obtained of the head, with routine multiplanar reconstructions performed. All CT scans at this facility use dose modulation, iterative reconstruction, and/or weight based dosing when appropriate to reduce radiation dose to as low as reasonably achievable. FINDINGS: A small predominantly low-density left frontoparietal subdural hematoma/hygroma has mildly increased measuring up to 5 mm on the coronal reconstructions. There is no significant midline shift, hydrocephalus, or other significant interval change identified. Bifrontal hemorrhagic contusions anteriorly measuring up to 2 cm on the right and mild subarachnoid hemorrhage appear unchanged. A nondisplaced occipital fracture extending to the foramen magnum to the right of midline is again noted. MILDLY INCREASING UP TO 5 MM LEFT FRONTOPARIETAL SUBDURAL HYGROMA/HEMATOMA. OTHERWISE, STABLE HEAD CT FROM 4/9/2021. Ct Head Wo Contrast    Result Date: 4/9/2021  CT HEAD WO CONTRAST : 4/9/2021 CLINICAL HISTORY:  SDH follow-up . COMPARISON: 4/8/2021. TECHNIQUE: Spiral unenhanced images were obtained of the head, with routine multiplanar reconstructions performed.  All CT scans at this facility use dose modulation, iterative reconstruction, and/or weight based dosing when appropriate to reduce radiation dose to as low as reasonably achievable. FINDINGS: Bifrontal hemorrhagic contusions measuring up to approximately 2 cm on the right, mild predominantly superolateral subarachnoid hemorrhage and up to 3 mm small left frontal subdural hematomas laterally and along the left side of the anterior falx appear unchanged There is no midline shift, hydrocephalus, or other significant changes identified. The mastoid air cells and visualized paranasal sinuses are essentially clear. STABLE ACUTE INTRACRANIAL HEMORRHAGES FROM YESTERDAY. NO EVIDENCE OF INTERVAL COMPLICATION IDENTIFIED. Ct Head Wo Contrast    Result Date: 4/8/2021  CT HEAD WO CONTRAST CLINICAL HISTORY: Bifrontal subdural hematoma with subarachnoid hemorrhage., Follow-up. Comparison: April 7, 2021 1838 hours TECHNIQUE: Multiple unenhanced serial axial images of the brain from the vertex of the skull to the base of the skull were performed. FINDINGS: The ventricles are dilated. Unchanged size configuration. Mo.  No mass. No midline shift. The cisterns are patent. There is developing area of hemorrhagic parenchymal contusion involving the left posterior inferior aspect of the parietal lobe. There is small subarachnoid hemorrhage noted. Again note is made of bifrontal subdural hematomas with extension of the subdural hematoma into the interhemispheric fissure. The left subdural hematoma extends overlying the left frontal lobe to the level of the posterior aspect of the left frontal lobe. There is associated bifrontal hemorrhagic contusions. The multiple parenchymal contusions are best appreciated on the coronal reconstructions series 601 image 34. There is been some interval enlargement of the right as compared to the prior examination.  There is now some increasing surrounding vasogenic edema There are now some scattered areas increased attenuation seen within the base of the left frontal lobe series 2 image 15 and small foci within the superior aspect of the left frontal lobe, series 2 image 2523. Either represent areas of hemorrhage. The visualized osseous structures are unremarkable. The visualized portion of the paranasal sinuses, and mastoids are unremarkable. IMPRESSION 1. INTERVAL DEVELOPMENT OF ACUTE HEMORRHAGIC PARENCHYMAL CONTUSIONS WITH SMALL SUBARACHNOID COMPONENTS IN THE LEFT POSTERIOR INFERIOR PARIETAL REGION. 2. THERE IS SMALL AREAS OF NEW INCREASED ATTENUATION DESCRIBED ABOVE WITHIN THE LEFT FRONTAL LOBE. FINDINGS REPRESENT SMALL AREAS OF PARENCHYMAL CONTUSION, HEMORRHAGE. 3. BIFRONTAL SUBDURAL HEMATOMAS WITH ASSOCIATED PARENCHYMAL HEMORRHAGIC CONTUSIONS AS DESCRIBED ABOVE. RECOMMEND MRI TO FURTHER EVALUATE All CT scans at this facility use dose modulation, iterative reconstruction, and/or weight based dosing when appropriate to reduce radiation dose to as low as reasonably achievable. Ct Head Wo Contrast    Result Date: 4/7/2021  COMPARISON: September 18, 2020. HISTORY:  fell off a wheel chair slight hematoma  occiput TECHNIQUE: The study was performed without contrast FINDINGS: In the bifrontal regions there is hyperdensity seen in the sulci and falx. The ventricular system is midline with no evidence for hydrocephalus. There is soft tissue prominence also seen The visualized paranasal sinuses and orbits appear unremarkable. No lytic or blastic changes seen in the calvarium. Bifrontal subarachnoid hemorrhage is seen. This was discussed with Dr. Santamaria Ards on today's date at 1:35 All CT scans at this facility use dose modulation, iterative reconstruction, and/or weight based dosing when appropriate to reduce radiation dose to as low as reasonably achievable. Cta Neck W Wo Contrast    Result Date: 4/12/2021  EXAMINATION: CTA neck CLINICAL HISTORY: Subarachnoid hemorrhage FINDINGS: Study was performed after the patient received 300 mL of Isovue-300. 2-D and 3-D reconstructions of the extracranial carotid vasculature obtained from the arch to the skull base.  Right carotid vasculature: Right common carotid artery, bifurcation, right internal and external carotid arteries widely patent free of significant stenosis. Left carotid vasculature: Left common carotid artery, bifurcation, left internal and external carotid arteries widely patent from the arch to the skull base. Vertebral arteries: Left vertebral artery dominant vessel. No focal stenosis in the vertebral vasculature. Nondisplaced fracture through the posterior right occipital bone just off the midline noted. Severe degenerative changes overlie the mid cervical spine most prominent at the C4-C5 level subjacent to the fused C5-C7 vertebral bodies. UNREMARKABLE CTA OF THE NECK. NASCET CRITERIA EMPLOYED. Ct Cervical Spine Wo Contrast    Result Date: 4/12/2021  EXAMINATION: CT CERVICAL SPINE WO CONTRAST   DATE AND TIME:4/7/2021 6:44 PM CLINICAL HISTORY:Acute posterior neck pain  trauma  COMPARISON: None TECHNIQUE:Helical scanning was performed from the skull base through the remainder of the cervical spine without intravenous contrast. Sagittal and coronal reformats were obtained. The lack of contrast limits CT sensitivity of the soft tissues. All CT scans at this facility use dose modulation, iterative reconstruction, and/or weight based dosing when appropriate to reduce radiation dose to as low as reasonably achievable. [facility] FINDINGS  Severe motion artifact limits exam quality inaccuracy Fracture:Extreme motion artifact motion artifact. No obvious fracture. Consider plain film radiography the C-spine if clinical concerns persist Dense atlas:Dens atlas relationship is unremarkable. Soft tissues:Airway patent. No soft tisssue mass or adenopathy. Other findings: Normal age-related bony changes. Extreme motion artifact. No definite fracture.    EXAMINATION: CT CERVICAL SPINE WO CONTRAST  DATE AND TIME:4/7/2021 6:44 PM CLINICAL HISTORY: Severe headache.  trauma  COMPARISON: None TECHNIQUE:Axial CT from skull base to vertex without  contrast..  All CT scans at this facility use dose modulation, iterative reconstruction, and/or weight based dosing when appropriate to reduce radiation dose to as low as reasonably achievable. FINDINGS. Bilateral frontal areas of hemorrhagic contusion with bifrontal frontal subarachnoid and subdural hemorrhage. Small subdural hemorrhages extend along the inner calvarium bilaterally extending approximately 4 mm deep to the inner calvarium. There is no midline shift. Small amount of subdural blood along the anterior falx. Small focus of hemorrhagic contusion along the left anterior temporal lobe. No intraventricular blood. No significant parenchymal edema demonstrated at this time. No other areas of acute hemorrhage. 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 . Globes intact. No acute fracture. Paranasal sinuses and mastoids are clear. These findings were discussed with the ER doctor IMPRESSION: BIFRONTAL HEMORRHAGIC CONTUSIONS WITH SMALL BILATERAL SUBDURAL HEMATOMAS. NO ACUTE FRACTURE     Xr Chest Portable    Result Date: 4/12/2021  EXAMINATION: XR CHEST PORTABLE REASON FOR EXAM: tachypnea FINDINGS: Frontal view of the chest reveals patchy subsegmental infiltrate developing at the right base suspicious for pneumonia. There is bibasilar parenchymal scarring noted as well. Heart normal in size. Central pulmonary vasculature within normal limits. RIGHT LOWER LOBE PNEUMONIA/ATELECTASIS. /65   Pulse 78   Temp 97.7 °F (36.5 °C) (Axillary)   Resp 18   Ht 6' 1\" (1.854 m)   Wt 137 lb 9.1 oz (62.4 kg)   SpO2 96%   BMI 18.15 kg/m²     More alert today. Awake attentive slow in movement but following commands with upper extremities and and with tongue protrusion. Less tachypneic    CAT scan from yesterday was reviewed by me along with chest x-ray and UA results. Status post traumatic anterior contrecoup subdural intraparenchymal hematoma stable.   Mild increase in left temporal hygroma but with minimal pressure. Currently on antibiotics for atelectasis/pneumonia possible UTI. Per primary service. Discussed with the patient's wife regarding his clinical condition. From neurosurgical standpoint I am glad he is clinically showing some improvement. We will continue to follow with you.

## 2021-04-12 NOTE — PROGRESS NOTES
[facility] EMERGENCY [facility] AT [place] Respiratory Therapy Evaluation   Current Order:  Albuterol Q6 PRN     Home Regimen: BID      Ordering Physician: Baron Richard  Re-evaluation Date: 4/15    Diagnosis: [address]      Patient Status: Stable / Unstable + Physician notified    The following MDI Criteria must be met in order to convert aerosol to MDI with spacer. If unable to meet, MDI will be converted to aerosol:  []  Patient able to demonstrate the ability to use MDI effectively  []  Patient alert and cooperative  []  Patient able to take deep breath with 5-10 second hold  []  Medication(s) available in this delivery method   []  Peak flow greater than or equal to 200 ml/min            Current Order Substituted To  (same drug, same frequency)   Aerosol to MDI [] Albuterol Sulfate 0.083% unit dose by aerosol Albuterol Sulfate MDI 2 puffs by inhalation with spacer    [] Levalbuterol 1.25 mg unit dose by aerosol Levalbuterol MDI 2 puffs by inhalation with spacer    [] Levalbuterol 0.63 mg unit dose by aerosol Levalbuterol MDI 2 puffs by inhalation with spacer    [] Ipratropium Bromide 0.02% unit dose by aerosol Ipratropium Bromide MDI 2 puffs by inhalation with spacer    [] Duoneb (Ipratropium + Albuterol) unit dose by aerosol Ipratropium MDI + Albuterol MDI 2 puffs by inhalation w/spacer   MDI to Aerosol [] Albuterol Sulfate MDI Albuterol Sulfate 0.083% unit dose by aerosol    [] Levalbuterol MDI 2 puffs by inhalation Levalbuterol 1.25 mg unit dose by aerosol    [] Ipratropium Bromide MDI by inhalation Ipratropium Bromide 0.02% unit dose by aerosol    [] Combivent (Ipratropium + Albuterol) MDI by inhalation Duoneb (Ipratropium + Albuterol) unit dose by aerosol       Treatment Assessment [Frequency/Schedule]:  Change frequency to: _______Albuterol BID & Q2 PRN___________________________________________per Protocol, P&T, MEC      Points 0 1 2 3 4   Pulmonary Status  Non-Smoker  [x]   Smoking history   < 20 pack years  []   Smoking history  ?  20 pack years  []   Pulmonary Disorder  (acute or chronic)  []   Severe or Chronic w/ Exacerbation  []     Surgical Status No [x]   Surgeries     General []   Surgery Lower []   Abdominal Thoracic or []   Upper Abdominal Thoracic with  PulmonaryDisorder  []     Chest X-ray Clear/Not  Ordered     []  Chronic Changes  Results Pending  [x]  Infiltrates, atelectasis, pleural effusion, or edema  []  Infiltrates in more than one lobe []  Infiltrate + Atelectasis, &/or pleural effusion  []    Respiratory Pattern Regular,  RR = 12-20 [x]  Increased,  RR = 21-25 []  SHANNON, irregular,  or RR = 26-30 []  Decreased FEV1  or RR = 31-35 []  Severe SOB, use  of accessory muscles, or RR ? 35  []    Mental Status Alert, oriented,  Cooperative [x]  Confused but Follows commands []  Lethargic or unable to follow commands []  Obtunded  []  Comatose  []    Breath Sounds Clear to  auscultation  [x]  Decreased unilaterally or  in bases only []  Decreased  bilaterally  []  Crackles or intermittent wheezes []  Wheezes []    Cough Strong, Spontan., & nonproductive [x]  Strong,  spontaneous, &  productive []  Weak,  Nonproductive []  Weak, productive or  with wheezes []  No spontaneous  cough or may require suctioning []    Level of Activity Ambulatory []  Ambulatory w/ Assist  []  Non-ambulatory []  Paraplegic [x]  Quadriplegic []    Total    Score:___4____     Triage Score:____5____      Tri       Triage:     1. (>20) Freq: Q3    2. (16-20) Freq: Q4   3. (11-15) Freq: QID & Albuterol Q2 PRN    4. (6-10) Freq: TID & Albuterol Q2 PRN    5. (0-5) Freq Q4prn

## 2021-04-12 NOTE — PROGRESS NOTES
Comprehensive Nutrition Assessment    Type and Reason for Visit:  Reassess    Nutrition Recommendations/Plan: Continue NPO, Start Tube Feeding    Nutrition Assessment:  Pt unable to take po diet at this time d/t dysphagia per SLP, currently NPO status. Corpak planned for EN support. Recommend intiate TF via coprak (after electrolytes are corrected) @ 25ml/hr x 24hrs, then increase to goal of 50ml/hr, if tolerated and electrolytes are wnl. Malnutrition Assessment:  Malnutrition Status:  Severe malnutrition    Context:  Acute Illness     Findings of the 6 clinical characteristics of malnutrition:  Energy Intake:  No significant decrease in energy intake  Weight Loss:  Unable to assess     Body Fat Loss:  7 - Moderate body fat loss Orbital, Buccal region   Muscle Mass Loss:  7 - Moderate muscle mass loss Temples (temporalis), Clavicles (pectoralis & deltoids)  Fluid Accumulation:  Unable to assess     Strength:  Not Performed    Estimated Daily Nutrient Needs:  Energy (kcal):  ~1860 kcals @ 30 kcal/kg; Weight Used for Energy Requirements:  Current     Protein (g):  81 g protein @ 1.3 g/kg; Weight Used for Protein Requirements:  Admission(62 kg)        Fluid (ml/day):  ~1900; Method Used for Fluid Requirements:  1 ml/kcal      Nutrition Related Findings: PMHx- spinal cord injury/paraplegia, s/p fall, found to have bifrontal subarachnoid hemorrhage. Per wife, his baseline mentation is A&Ox4. Pt's wife stated pt mostly took 'nutritious smoothies' pta with some solid foods and 'vit/min supplements' per [facility] MD. Pt currently A&O x 1. Pt made NPO today d/t pocketing food noted/SLP to re-evaluate. Labs (4/12)- K-2.6, Mg-1.6, K and Mg replacement ordered. IVF @ 75ml/hr.       Wounds:  (abrasion from fall, back of head)       Current Nutrition Therapies:    Diet NPO Effective Now    Anthropometric Measures:  · Height: 6' 1\" (185.4 cm)  · Current Body Weight: 137 lb 8 oz (62.4 kg)(4/10)   · Admission Body Weight: 137 lb (62.1 kg)(method unknwn)    · Usual Body Weight: 155 lb (70.3 kg)(9/2020 stated; 150lb (11/2019 [facility]))     · Ideal Body Weight: 184 lbs; % Ideal Body Weight   93%  · BMI: 18.1  · Adjusted Body Weight: 147.8; Paraplegia   · Adjusted BMI: 19.5    · BMI Categories: Normal weight      Nutrition Diagnosis:   · Inadequate oral intake related to acute injury/trauma, swallowing difficulty as evidenced by NPO or clear liquid status due to medical condition, swallow study results    Nutrition Interventions:   Food and/or Nutrient Delivery:  Continue NPO, Start Tube Feeding(Recommend intiate TF (once electrolytes are corrected) @ 25ml/hr x 24hrs and increase to goal of 50ml/hr, if tolerated and electrolytes are wnl.)  Nutrition Education/Counseling:  No recommendation at this time   Coordination of Nutrition Care:  Continue to monitor while inpatient    Goals:  EN to meet estimated nutritional needs. wt gain. electrolytes wnl. Nutrition Monitoring and Evaluation:   Food/Nutrient Intake Outcomes:  Enteral Nutrition Intake/Tolerance  Physical Signs/Symptoms Outcomes:  Weight, Biochemical Data, Chewing or Swallowing     Discharge Planning:     Too soon to determine     Electronically signed by Ramin Blum, VAISHALI, LD on 4/12/21 at 1:39 PM EDT

## 2021-04-12 NOTE — PLAN OF CARE
Nutrition Problem #1: Inadequate oral intake  Intervention: Continue NPO, Start Tube Feeding  Nutritional Goals: EN to meet estimated nutritional needs. wt gain. electrolytes wnl.

## 2021-04-12 NOTE — PROGRESS NOTES
Physical Therapy Missed Treatment   Facility/Department: [facility] MED SURG Q096/K732-87    NAME: Silvano Mckeon    : 1960 (62 y.o.)  MRN: [id number]    Account: [de-identified]  Gender: male      PT referral received. Chart reviewed. Pt's spouse present and deferred PT eval due to pt unable to follow instructions and due to spouse declined eval at this time. Spouse provided social functional information. RN made    Will follow and attempt PT evaluation again at earliest availability.        iMky Lomas, PT, 21 at 10:21 AM

## 2021-04-12 NOTE — PROGRESS NOTES
Subjective: The patient complains of severe acute on chronic progressive fatigue and confusion partially relieved by rest, PT, OT and meds and exacerbated by exertion and recent illness. I am concerned about patients medical complexities-bleeding critically low potassium-at 2.6. However patient was able to be moved out of the ICU. Her it appears that he now has an infection and his wife understands it it may be pneumonia may be a bladder infection we discussed the possibilities of both. He is on Maxipime and IV vancomycin. He is also getting potassium IV for his low potassium level. I reviewed his current antibiotic treatment with her. He is currently in n.p.o. status his lethargy she states that he has a baseline oral pharyngeal dysphagia and is on puréed diet at home. Reviewed his care at length with the trauma surgeon. We agree that he needs a feeding tube placed as he cannot safely swallow and he has aspiration pneumonia and chest x-ray from yesterday was reviewed showing RIGHT LOWER LOBE PNEUMONIA/ATELECTASIS. Reviewed recent nursing note and discussed current status and planned care with acute care providers, \"   Patient having recurrent episodes of tremor like activity. \".  Has been pocketing food and needing suctioning he is possibly n.p.o. but he had a tight swallow which cleared him for purées and thins-he likely needs a modified barium swallow    ROS x10: The patient also complains of severely impaired mobility and activities of daily living. Otherwise no new problems with vision, hearing, nose, mouth, throat, dermal, cardiovascular, GI, , pulmonary, musculoskeletal, psychiatric or neurological. See Rehab consult on Rehab chart .        Vital signs:  BP (!) 151/105   Pulse 87   Temp 97.7 °F (36.5 °C) (Axillary)   Resp 26   Ht 6' 1\" (1.854 m)   Wt 137 lb 9.1 oz (62.4 kg)   SpO2 94%   BMI 18.15 kg/m²   I/O:   PO/Intake:  NPO--dt lethargic-sepsis    Bowel/Bladder:  incontinent,  Dey-UTI versus colonization  General:  Patient is well developed, adequately nourished, non-obese and     well kempt. HEENT:    PERRLA, hearing intact to loud voice, external inspection of ear     and nose benign. Inspection of lips, tongue and gums benign  Musculoskeletal: No significant change in strength or tone. All joints stable. Inspection and palpation of digits and nails show no clubbing,       cyanosis or inflammatory conditions. Neuro/Psychiatric: Affect: flat-   lethargic right gaze preference and oriented to self and     Situation with max cues. No significant change in deep tendon reflexes or     sensation  Lungs:  Diminished, CTA-B. Respiration effort is normal at rest.  Right lower lobe pneumonia  Heart:   S1 = S2,   RRR. No loud murmurs. Abdomen:  Soft, non-tender, no enlargement of liver or spleen. Extremities:  No significant lower extremity edema or tenderness. Skin:    BUE bruises dt blood draws, no visualized or palpated problems.     Rehabilitation:  Physical therapy: FIMS:  Bed Mobility:      Transfers:  ,  ,      FIMS:  ,  ,      Occupational therapy: FIMS:   ,  ,      Speech therapy: FIMS:        Lab/X-ray studies reviewed, analyzed and discussed with patient and staff:   Recent Results (from the past 24 hour(s))   POCT Glucose    Collection Time: 04/11/21 11:47 AM   Result Value Ref Range    POC Glucose 142 (H) 60 - 115 mg/dl    Performed on ACCU-CHEK    POCT Glucose    Collection Time: 04/11/21  4:32 PM   Result Value Ref Range    POC Glucose 109 60 - 115 mg/dl    Performed on ACCU-CHEK    Urinalysis    Collection Time: 04/11/21  7:00 PM   Result Value Ref Range    Color, UA Yellow Straw/Yellow    Clarity, UA Clear Clear    Glucose, Ur 500 (A) Negative mg/dL    Bilirubin Urine Negative Negative    Ketones, Urine 15 (A) Negative mg/dL    Specific Gravity, UA 1.013 1.005 - 1.030    Blood, Urine Negative Negative    pH, UA 5.5 5.0 - 9.0    Protein, UA Negative Negative mg/dL    Urobilinogen, Urine 0.2 <2.0 E.U./dL    Nitrite, Urine POSITIVE (A) Negative    Leukocyte Esterase, Urine Negative Negative   Microscopic Urinalysis    Collection Time: 04/11/21  7:00 PM   Result Value Ref Range    Bacteria, UA MANY (A) Negative /HPF    Hyaline Casts, UA 1-3 0 - 5 /HPF    WBC, UA 10-20 (A) 0 - 5 /HPF    RBC, UA 0-2 0 - 5 /HPF    Epithelial Cells, UA 0-2 0 - 5 /HPF   Basic Metabolic Panel w/ Reflex to MG    Collection Time: 04/12/21  4:49 AM   Result Value Ref Range    Sodium 140 135 - 144 mEq/L    Potassium reflex Magnesium 2.6 (LL) 3.4 - 4.9 mEq/L    Chloride 105 95 - 107 mEq/L    CO2 25 20 - 31 mEq/L    Anion Gap 10 9 - 15 mEq/L    Glucose 117 (H) 70 - 99 mg/dL    BUN 7 6 - 20 mg/dL    CREATININE 0.29 (L) 0.70 - 1.20 mg/dL    GFR Non-African American >60.0 >60    GFR  >60.0 >60    Calcium 7.9 (L) 8.5 - 9.9 mg/dL   Magnesium    Collection Time: 04/12/21  4:49 AM   Result Value Ref Range    Magnesium 1.6 (L) 1.7 - 2.4 mg/dL       Previous extensive, complex labs, notes and diagnostics reviewed and analyzed. ALLERGIES:    Allergies as of 04/07/2021 - Review Complete 04/07/2021   Allergen Reaction Noted    Vicodin [hydrocodone-acetaminophen] Nausea Only 05/18/2012      (please also verify by checking [facility])     Complex Physical Medicine & Rehab Issues Assess & Plan:   1. Severe abnormality of gait and mobility and impaired self-care and ADL's secondary to bilateral subarachnoid hemorrhage with subdural hematoma and contusions TBI status post old C7 complete quadriplegia. Functional and medical status reassessed regarding patients ability to participate in therapies and patient found to be able to participate in  acute intensive comprehensive inpatient rehabilitation program including PT/OT to improve balance, ambulation, ADLs, and to improve the P/AROM.    It is my opinion that they will be able to tolerate 3 hours of therapy a day and benefit from it at an acute level. 2. Bowel constipation and Bladder dysfunction overactive bladder:  frequent toileting, ambulate to bathroom with assistance, check post void residuals. Check for C.difficile x1 if >2 loose stools in 24 hours, continue bowel & bladder program.  Monitor for UTI symptoms including lethargy and confusion  3. Severe headache and generalized OA pain: reassess pain every shift and prior to and after each therapy session, give prn  Tylenol, modalities prn in therapy, consider Lidoderm, K-pad prn.   4. Skin breakdown risk:  continue pressure relief program.  Daily skin exams and reports from nursing. 5. Severe fatigue due to immobility and nutritional deficits: He will get a feeding tube placed because of right lower lobe pneumonia probable aspiration and significant swallowing deficits at baseline and much worse since recent TBI add vitamin B12 vitamin D and CoQ10 titrate dosing and add protein supplementation with low carb content. 6. Complex discharge planning:   Await medical stability patient either has a UTI and/or colonization. Change Dey catheter recheck UA correct potassium. Patient also at is high risk for oral pharyngeal dysphagia as he has oropharyngeal dysphagia at baseline with a new traumatic brain injury he is n.p.o. may need feeding tube because he already appeared fairly malnourished. Discussed at length with his wife and I will attempt to get a hold of his family doctor Dr. Albert Hernandez with the [facility] clinic. Agree with n.p.o. status. I however disagree with the skilled plan at discharge she is too complicated from a physiatric as well as a medical standpoint to go to a skilled center at this point. He needs to come to acute rehab once he stabilizes. Complex Active General Medical Issues that complicate care Assess & Plan:     1.  Principal Problem:    SAH (subarachnoid hemorrhage) (HCC)  Active Problems:    Depression    Severe protein-calorie malnutrition ([facility] [address]    C7 spinal cord injury ([facility] [address]    Cognitive change    Neurogenic bowel    Neurogenic bladder    Acute pain due to trauma    Acute metabolic encephalopathy    Hypokalemia    Acute cystitis with hematuria  Resolved Problems:    Hyponatremia        Jez Martin D.O., PM&R     Attending    [address]

## 2021-04-12 NOTE — PROGRESS NOTES
should be treated as presumptive and,   if inconsistent with clinical signs and symptoms or necessary for   patient management, should be tested with an alternative molecular   assay. Negative results do not preclude SARS-CoV-2 infection and   should not be used as the sole basis for patient management decisions. This test has been authorized by the FDA under an Emergency Use   Authorization (EUA) for use by authorized laboratories. Fact sheet for Healthcare Providers:   Medhat.zackary   Fact sheet for Patients: Medhat.zackary     METHODOLOGY: Isothermal Nucleic Acid Amplification           Ht Readings from Last 1 Encounters:   04/10/21 6' 1\" (1.854 m)        Wt Readings from Last 1 Encounters:   04/10/21 137 lb 9.1 oz (62.4 kg)       Body mass index is 18.15 kg/m². Estimated Creatinine Clearance: 242 mL/min (A) (based on SCr of 0.29 mg/dL (L)). Goal Trough Level: 10-20 mcg/mL    Assessment/Plan:  Will initiate Vancomycin with a one time loading dose of 1500 mg x1, followed by 1250 mg IV every 12 hours. Timing of trough level will be determined based on culture results, renal function, and clinical response. Obtain TR before 4th total dose on 4/13 @ 2230. Insight-Rx predicts an AUC of 462 with a Ctr 12.4, Pauc 65%, Pconc 19%, and tox 8%. Thank you for the consult. Will continue to follow.     Keron Jacobsen, PharmD, BCPS, [facility]  Staff Pharmacist  4/12/2021 10:37 AM

## 2021-04-12 NOTE — PROGRESS NOTES
TRAUMA DAILY PROGRESS NOTE      Patient Name: Raleigh Del Angel  Admission Date 4/7/2021    Hospital Day: 5  Patient seen and examined on 4/12/2021    INTERVAL HISTORY/EVENTS     Background:  Jackie Luna is a 62 y. o. male with a PMHx of paraplegia presented as a transfer from [facility] on 4/7/21 after he was found to have bifrontal subarachnoid hemorrhage. Per reports patient was found down at his home by a [employer] . It was suspected that he feel backwards out of his wheelchair. Upon arrival to [facility] ED, pt noted to be behavioral, impulsive, and altered from baseline mentation. A&Ox1 (self), pulling at line, and physically/verbally combative with staff requiring Ativan and soft restraints. Per wife, his baseline mentation is A&Ox4 and he holds a job with [employer]. He was admitted to the Trauma Service with Neurosurgery (Dr. Emily Isaac) consulted.  [facility] Course:  4/7/2021: Found to have bifrontal SAH at [facility] and transferred to [facility] s/p found down at home by [employer] . AMS and hyponatremia noted upon arrival. NSGY consulted. 4/8/2021: Precedex gtt started for impulsivity and safety. Remains A&Ox0, GCS 2/3/5. [address] with interval development of bifrontal SAH and new area of SAH at left parietal region. 4/9/2021: Repeat CT head completed-stable. Precedex gtt weaned off but had to be resumed in the evening due to agitation/restlessness.   4/10/2021: Improving mentation. Precedex infusion discontinued. Transferred to the [facility]  4/11/2021: Worsening mentation on AM examination. Repeat CT head completed with slight increase in L frontal SDH/hygroma but otherwise stable. Required placement of O2 overnight due to drop in O2 saturations to 97%. CXR ordered by NSGY with concern for RLL consolidation/PNA    24 Hour Events:  Per nursing, patient pocketing food yesterday evening when being feed. He was noted to have desaturations on room air in to the 70's.   Patient placed on supplemental O2 and orally suctioned for ~300 mL of thin tan colored fluid. O2 saturations improved. No complaints on examination this morning. Per wife, who is at bedside, reports that patient complained of pain to her this morning. Subjective portion of the examination limited due to patient's mental status. Labs reviewed. New leukocytosis this AM 15 (7.8). H and H stable increased today 13.1/38.4 (11.7/34.5). Platelets stable 413 (209). BMP with hypokalemia 2.6 (3.1) despite electrolyte replacement yesterday. Magnesium 1.6. Remainder of electrolytes are acceptable and BUN/creatinine remains stable 7/0.29 (12/0.30)    Intake: None documented  Output: 4150 mL   BM x0 occurrences. No documented BM since admit on 2021      PHYSICAL EXAM     Vitals Average, Min and Max for last 24 hours:  Temp: Temp: 97.7 °F (36.5 °C); Temp  Av.9 °F (36.6 °C)  Min: 97.7 °F (36.5 °C)  Max: 98 °F (36.7 °C)  Respirations: Resp  Av  Min: 16  Max: 26  Pulse: Pulse  Av.6  Min: 81  Max: 91  Blood Pressure: Systolic (51FZY), IBX:327 , Min:137 , FMB:144   ; Diastolic (23TYW), NAHID:89, Min:89, Max:112    SpO2: SpO2  Av.5 %  Min: 93 %  Max: 97 %    24hr Intake/Output:     Intake/Output Summary (Last 24 hours) at 2021 1020  Last data filed at 2021 0606  Gross per 24 hour   Intake --   Output 3600 ml   Net -3600 ml       Vitals: BP (!) 151/105   Pulse 87   Temp 97.7 °F (36.5 °C) (Axillary)   Resp 26   Ht 6' 1\" (1.854 m)   Wt 137 lb 9.1 oz (62.4 kg)   SpO2 94%   BMI 18.15 kg/m²     Physical Exam:    Constitutional: Lying in bed, HOB elevated.  Sleepy but arrousable to verbal stimuli. Minimally interactive this AM. Appears comfortable, in NAD. HEENT: Atraumatic normocephalic. Cardiovascular: Regular rate and rhythm. Palpable radial and DP pulses bilaterally. Pulmonary: Rhonchi noted throughout bilaterally upper> lower. Diminished in bilateral bases.  No wheezing or rales. Increased work of breathing appreciated this AM. Acceptable O2 saturations on nasal cannula. Abdominal: Soft. Non-distended. Non-tender. : Indwelling toscano catheter in place (chronic) , draining clear, yellow urine.   Musculoskeletal: Moves BUE spontaneously and to command with persistence, no motor in BLE at baseline secondary to prior SCI.  No peripheral edema appreciated. Neurological: Sleepy, arouses to verbal stimuli. Speech more limited this AM than prior examinations-attempts to say first and last name but unable to complete vocalize. Oriented to self only. Face grossly symmetric.  OD 3 mm and brisk OS 5mm and brisk. Does not participate in EOM testing this AM.  Gaze up and to the right but able to cross midline. Able to follow commands with BUE this AM but decreased strength appreciated from prior examinations. Hands contracted/spastic at baseline.  No motor in BLE secondary to prior SCI.  Sensation intact to BUE.    Skin: Warm and dry.     LABORATORY RESULTS (LAST 24 HOURS)     CBC with Differential:    Lab Results   Component Value Date    WBC 15.0 04/12/2021    RBC 4.34 04/12/2021    HGB 13.1 04/12/2021    HCT 38.4 04/12/2021     04/12/2021    MCV 88.4 04/12/2021    MCH 30.2 04/12/2021    MCHC 34.2 04/12/2021    RDW 13.8 04/12/2021    LYMPHOPCT 5.4 04/08/2021    MONOPCT 9.3 04/08/2021    BASOPCT 0.1 04/08/2021    MONOSABS 0.4 04/08/2021    LYMPHSABS 0.3 04/08/2021    EOSABS 0.0 04/08/2021    BASOSABS 0.0 04/08/2021     CMP:    Lab Results   Component Value Date     04/12/2021    K 2.6 04/12/2021     04/12/2021    CO2 25 04/12/2021    BUN 7 04/12/2021    CREATININE 0.29 04/12/2021    GFRAA >60.0 04/12/2021    LABGLOM >60.0 04/12/2021    GLUCOSE 117 04/12/2021    PROT 7.0 04/07/2021    LABALBU 4.5 04/07/2021    CALCIUM 7.9 04/12/2021    BILITOT 0.6 04/07/2021    ALKPHOS 76 04/07/2021    AST 22 04/07/2021    ALT 23 04/07/2021     Magnesium:    Lab Results   Component Value Date    MG 1.6 04/12/2021     PT/INR:    Lab Results Component Value Date    PROTIME 13.8 04/07/2021    INR 1.1 04/07/2021     PTT:  No results found for: APTT, PTT    IMAGING RESULTS (PERSONALLY REVIEWED)     4/11/2021 CXR: Frontal view of the chest reveals patchy subsegmental infiltrate developing at the right base suspicious for pneumonia. There is bibasilar parenchymal scarring noted as well. Heart normal in size. Central pulmonary vasculature within normal limits. 4/11/2021 CTA neck: Right carotid vasculature: Right common carotid artery, bifurcation, right internal and external carotid arteries widely patent free of significant stenosis. Left common carotid artery, bifurcation, left internal and external carotid arteries widely patent from the arch to the skull base. Left vertebral artery dominant vessel. No focal stenosis in the vertebral vasculature. Nondisplaced fracture through the posterior right occipital bone just off the midline noted. Severe degenerative changes overlie the mid cervical spine most prominent at the C4-C5 level subjacent to the fused C5-C7 vertebral bodies. ASSESSMENT & PLAN     Diagnoses:  1. S/p found down at home on 4/7/21  2. Bifrontal subarachnoid hemorrhage (NSGY, non-op)  3. Acute metabolic encephalopathy  4. Left parietal subarachnoid hemorrhage (NSGY, non-op)  5. Hypokalemia   6. Neurogenic bladder  7. Neurogenic bowel  8. Occipital skull fracture extending to the foramen magnum (CTA negative)  9. Leukocytosis  10. Aspiration PNA     PMHx: Seizure disorder, chronic pain     Incidental Findings: None (Reviewed by JLR, 4/8/21)        ASSESSMENT/PLAN:  Neurological:   Bifrontal subarachnoid hemorrhage. [facility] with interval development of bifrontal contusions and new area of SAH at left parietal region as well as bifrontal SDH, stable on subsequent imaging. Per wife, patient with chronic back pain and worsened at baseline when in supine position.  Occipital skull fracture extending to the foramen magnum. CTA negative for BCVI. - NSGY consulted, appreciate recs: continue with keppra x 1 week, change to PO when able, Q4 neuro checks  - Continue IV Keppra 500mg BID x 1 week-transition to PO when patient more awake  - Continue seizure precautions  - SLP consulted for cognitive evaluation and swallow evaluation, appreciate recs. - Continue PO Tylenol 650mg q 4hrs prn mild/moderate pain  - Avoiding opioid medications in the setting or worsening mental status today     Cardiovascular:   No acute cardiovascular issues. BP's have been acceptable with no tachycardia appreciated.   - Continue IV Hydralazine and Labetalol PRN SBP greater than 160mmHg     Respiratory:   Desaturations overnight, correlated with PO intake and patient pocketing food. Now requiring supplemental O2 since last night. CXR completed 4/11 with RLL consolidation and concern for aspiration PNA. - Maintain O2 sat >92%  - Continue pulmonary toilet  - Vest therapy Q6h scheduled with albuterol treatments  - Repeat CXR in AM      GI/Diet:  Bedside swallow completed 4/10 by SLP with diet recommendations. Patient with worsening mental status and concern for aspiration.  - Will change diet to NPO in setting of worsening mental status an concern for aspiration  - Repeat SLP swallow evaluation once mentation improved  - Continue bowel regimen (colace BID, senna QD, dulcolax suppository QOD with digital stimulation)     Renal/Electrolytes:   Hyponatremia resolved, hypokalemic this AM despite electrolyte replacement yesterday.  Chronic indwelling toscano catheter for neurogenic bladder in the setting of prior spinal cord injury.    - Increase NS to 100cc/hr in setting of poor PO intake  - Replete electrolytes PRN-give additional 2 grams IV Mag and 40mq KCl IV, once enteral access obtained will given additional 40meq KCL via corpak  - Repeat BMP/Mag in AM   - Maintain indwelling toscano catheter for history of neurogenic bladder     ID:   Patient with leukocytosis on AM CBC although remains afebrile. CXR with RLL consolidation concerning for PNA   - Start Vanco and cefepime.  - Pharmacy consult for vanco dosing  - Daily CBC for now     Heme:   No acute hematologic issues. H and H remains acceptable. - No indication for transfusion.  - Repeat CBC in AM to trend H and H.      Endocrine:   No acute glycemic/endocrine issues.     MSK:   PMHx of paraplegia.   - Activity: OOB to chair TID  - Spines: Clear  - Weight bearing restrictions: None  - PT/OT: Attempted evaluations on prior days but unable to complete secondary to mental status. Appreciate PT/OT for passive ROM/exercises     Tubes/Lines/Drains:  -Maintain PIVs  -Maintain toscano catheter.  Patient has chronic, indwelling toscano catheter that he presented to hospital with, for history of neurogenic bladder in the setting of spinal cord injury.      Prophylaxis:   - SCDs only, No chemo PPx given bleed risk and most recent CT head only showed stability on 4/6/[id number] discuss timing of DVT chemoprophylaxis with NSGY today  - No GI PPx indicated     Dispo: Continue care on RNF while optimizing respiratory status, monitoring neurological status and for initiation of IV antibiotics for presumed PNA. Will need eventual PT/OT evaluations to assist with appropriate dispo planning. Patient's wife, Mansi Velazquez at bedside during evaluation. She was updated on patient's condition and ongoing plan of care. All questions were answered.      Follow up with Neurosurgery (Dr. Sohail Sevilla) in TBD  No Trauma follow up needed        Please call for any questions or concerns.        e [address]  [phone] ([phone])  [phone]     This patient's plan of care was discussed and made in collaboration with Trauma Attending physician, Rachel Grimaldo MD    Teaching Physician Note:  I have personally performed a face to face diagnostic  evaluation on this patient.       I have reviewed and agree with the care plan. History and exam by me demonstrates:  A pt with paraplegia who now is on the trauma service after fall resulting in the following injuries:  Bifrontal subarachnoid hemorrhage.   . rCTH 4/8/21 with interval development of bifrontal contusions and new area of SAH at left parietal region as well as bifrontal SDH, stable on subsequent imaging. Per wife, patient with chronic back pain and worsened at baseline when in supine position. Occipital skull fracture extending to the foramen magnum. Plan/MDM:  We had two separate conversations with his wife as well. We spent about 20 - 25 minutes answering her questions. He does seem to have an RLL infiltrate and increased leukocytosis; thus we started him on Abs for a pneumonia; We also will place corpak to get tubefeeds started.     Quan Wagner

## 2021-04-12 NOTE — PROGRESS NOTES
[address]  Facility/Department: [facility]  Speech Language Pathology   Treatment Note          Deuce Luna  1960  [id number]/[id number]    Medical Dx: [facility] (subarachnoid hemorrhage) ([facility] [address] [I60.9]  Speech Dx: Dysphagia and Aphasia     4/12/2021    Subjective:  Alert, Distractible and Confused        Interventions used this date:  Expressive Language, Receptive Language, Cognitive Skill Development and Dysphagia Treatment    Objective/Assessment:  Patient progressing towards goals:  Goal 1: Pt will follow 1 step directions given orally with 75% accuracy with mod cues to increase the pt's ability to follow directions provided by caregivers for safe follow through with ADLs. Pt followed 1 step directions related to oral motor movements with delay and intermittent perseveration with 60% accuracy. Goal 2: Pt will identify objects/pictures within a field of 2-4 with 70% accuracy with mod cues in order to increase his understanding of objects in his environment for safer and more independent completion of ADLs. Pt kept head and eye gaze to upper right side. Pt perseverating on describing pocket mask container hanging on wall, stating \"yellow container with black lines\". Total assist to bring head midline- unable to maintain. Goal 3: Pt will complete confrontational naming tasks with 60% accuracy with mod verbal cues to help the patient express his basic wants and needs. Pt able to gaze at a picture of him and his wife when placed in upper right. Named himself in picture, stating \"me\" then \"Cam\". Unable to name his wife. Goal 4: Pt will complete automatic speech tasks with 70% accuracy with mod verbal cues to help the patient express his basic wants and needs. Pt counted 1-10 x 2 trials. Max cues for numbers 11-15. Able to repeat. Goal 5:  To decrease cognitive deficits and improve attention to tasks for safe completion of ADLs, pt will sustain attention to participate in functional activities for up to 5 minutes with mild redirection. Pt able to maintain attention to varying tasks for approximately 2-3 minutes with max cues. Continued to perseverate on pocket mask on wall. Goal 6: Pt will answer simple level yes/no questions with 70% accuracy with mild cues to assist the caregiver in obtaining important information regarding the patient's personal, medical, and safety needs. Pt answered simple yes/no questions with 70% accuracy. Pt did not agree that he was in the hospital when orienting pt. Pt was not aware lights were on in room. Pt was made NPO due to pocketing food and suctioning that was required. Orders for BSE and MBS placed. Pt not maintaining secretions and drooling occurred on right side. Pt performed oral motor movements with delayed initiation and decreased labial/lingual strength, decreased lingual ROM and coordination. Pt swallowed secretions when prompted with max tactile cues and delayed swallow, audible swallow, fair laryngeal elevation. Pt was given trial of 1/4 tsp applesauce in which he had fair labial seal around spoon, increased time for bolus propulsion, and delayed initiation of swallow. Pt attempted to swallow again but was unable. Vocal quality unchanged and no oral residue. Pt presents with mild wet respirations due to current pneumonia. No further trials performed due to severity of swallow and change of status since BSE was performed. Rec: NPO with alternative means of nutrition and hydration. Pt not appropriate for MBS this date. Discussed with Sonny Mccullough RN. Update swallow goals to the followin. Pt will perform oral motor labial/lingual ROM/strengthening exercises 5x/each with mod cues. 2. Pt will tolerate therapeutic trials of puree/honey via spoon with no overt s/s of difficulty or aspiration on 100% of trials. 3. Pt will exhibit swallow response following thermal tactile stim 5/5 trials. 4.  MBS to be performed when deemed appropriate by

## 2021-04-12 NOTE — FLOWSHEET NOTE
Patient's wife unhappy that cor pac was unable to be placed today. She stated \"it was not placed not because PA could not place, but because her technique was bad\" and then stormed off back into patient's room. Wife initially refused to leave once visiting hours were over, but eventually complied.

## 2021-04-12 NOTE — FLOWSHEET NOTE
Patient was pocketing food and required suctioning over night. Patient is NPO at this time until speech can evaluate him. Patient is able to respond to some questions with delayed response. LS diminished with rhonchi present. BS hypoactive. Hayley NP at bedside.

## 2021-04-13 ENCOUNTER — APPOINTMENT (OUTPATIENT)
Dept: GENERAL RADIOLOGY | Age: 61
DRG: 085 | End: 2021-04-13
Payer: COMMERCIAL

## 2021-04-13 LAB
ANION GAP SERPL CALCULATED.3IONS-SCNC: 11 MEQ/L (ref 9–15)
BASOPHILS ABSOLUTE: 0 K/UL (ref 0–0.2)
BASOPHILS RELATIVE PERCENT: 0 %
BUN BLDV-MCNC: 11 MG/DL (ref 6–20)
CALCIUM SERPL-MCNC: 7.5 MG/DL (ref 8.5–9.9)
CHLORIDE BLD-SCNC: 104 MEQ/L (ref 95–107)
CO2: 23 MEQ/L (ref 20–31)
CREAT SERPL-MCNC: 0.29 MG/DL (ref 0.7–1.2)
EOSINOPHILS ABSOLUTE: 0 K/UL (ref 0–0.7)
EOSINOPHILS RELATIVE PERCENT: 0 %
GFR AFRICAN AMERICAN: >60
GFR NON-AFRICAN AMERICAN: >60
GLUCOSE BLD-MCNC: 111 MG/DL (ref 70–99)
GLUCOSE BLD-MCNC: 135 MG/DL (ref 60–115)
GLUCOSE BLD-MCNC: 137 MG/DL (ref 60–115)
HCT VFR BLD CALC: 36.5 % (ref 42–52)
HEMOGLOBIN: 12.3 G/DL (ref 14–18)
LYMPHOCYTES ABSOLUTE: 0.4 K/UL (ref 1–4.8)
LYMPHOCYTES RELATIVE PERCENT: 3 %
MAGNESIUM: 1.7 MG/DL (ref 1.7–2.4)
MCH RBC QN AUTO: 30 PG (ref 27–31.3)
MCHC RBC AUTO-ENTMCNC: 33.8 % (ref 33–37)
MCV RBC AUTO: 88.9 FL (ref 80–100)
MONOCYTES ABSOLUTE: 0.5 K/UL (ref 0.2–0.8)
MONOCYTES RELATIVE PERCENT: 3.5 %
NEUTROPHILS ABSOLUTE: 12.3 K/UL (ref 1.4–6.5)
NEUTROPHILS RELATIVE PERCENT: 93.5 %
PDW BLD-RTO: 13.7 % (ref 11.5–14.5)
PERFORMED ON: ABNORMAL
PERFORMED ON: ABNORMAL
PLATELET # BLD: 206 K/UL (ref 130–400)
POTASSIUM REFLEX MAGNESIUM: 3.7 MEQ/L (ref 3.4–4.9)
RBC # BLD: 4.1 M/UL (ref 4.7–6.1)
SODIUM BLD-SCNC: 138 MEQ/L (ref 135–144)
WBC # BLD: 13.1 K/UL (ref 4.8–10.8)

## 2021-04-13 PROCEDURE — 1210000000 HC MED SURG R&B

## 2021-04-13 PROCEDURE — 80048 BASIC METABOLIC PNL TOTAL CA: CPT

## 2021-04-13 PROCEDURE — 94761 N-INVAS EAR/PLS OXIMETRY MLT: CPT

## 2021-04-13 PROCEDURE — 6360000002 HC RX W HCPCS: Performed by: NEUROLOGICAL SURGERY

## 2021-04-13 PROCEDURE — 94760 N-INVAS EAR/PLS OXIMETRY 1: CPT

## 2021-04-13 PROCEDURE — 2580000003 HC RX 258: Performed by: NURSE PRACTITIONER

## 2021-04-13 PROCEDURE — 71045 X-RAY EXAM CHEST 1 VIEW: CPT

## 2021-04-13 PROCEDURE — 92526 ORAL FUNCTION THERAPY: CPT

## 2021-04-13 PROCEDURE — 94640 AIRWAY INHALATION TREATMENT: CPT

## 2021-04-13 PROCEDURE — 85025 COMPLETE CBC W/AUTO DIFF WBC: CPT

## 2021-04-13 PROCEDURE — 2580000003 HC RX 258: Performed by: NEUROLOGICAL SURGERY

## 2021-04-13 PROCEDURE — 6360000002 HC RX W HCPCS: Performed by: NURSE PRACTITIONER

## 2021-04-13 PROCEDURE — 92507 TX SP LANG VOICE COMM INDIV: CPT

## 2021-04-13 PROCEDURE — 94668 MNPJ CHEST WALL SBSQ: CPT

## 2021-04-13 PROCEDURE — 36415 COLL VENOUS BLD VENIPUNCTURE: CPT

## 2021-04-13 PROCEDURE — APPSS30 APP SPLIT SHARED TIME 16-30 MINUTES: Performed by: PHYSICIAN ASSISTANT

## 2021-04-13 PROCEDURE — 99232 SBSQ HOSP IP/OBS MODERATE 35: CPT | Performed by: PHYSICAL MEDICINE & REHABILITATION

## 2021-04-13 PROCEDURE — 83735 ASSAY OF MAGNESIUM: CPT

## 2021-04-13 PROCEDURE — 97167 OT EVAL HIGH COMPLEX 60 MIN: CPT

## 2021-04-13 PROCEDURE — 6360000002 HC RX W HCPCS: Performed by: PHYSICIAN ASSISTANT

## 2021-04-13 PROCEDURE — 2580000003 HC RX 258: Performed by: PHYSICIAN ASSISTANT

## 2021-04-13 PROCEDURE — 97163 PT EVAL HIGH COMPLEX 45 MIN: CPT

## 2021-04-13 RX ORDER — POTASSIUM CHLORIDE 7.45 MG/ML
10 INJECTION INTRAVENOUS ONCE
Status: COMPLETED | OUTPATIENT
Start: 2021-04-13 | End: 2021-04-14

## 2021-04-13 RX ORDER — MAGNESIUM SULFATE IN WATER 40 MG/ML
2000 INJECTION, SOLUTION INTRAVENOUS ONCE
Status: COMPLETED | OUTPATIENT
Start: 2021-04-13 | End: 2021-04-13

## 2021-04-13 RX ORDER — KETOROLAC TROMETHAMINE 15 MG/ML
15 INJECTION, SOLUTION INTRAMUSCULAR; INTRAVENOUS EVERY 6 HOURS
Status: DISCONTINUED | OUTPATIENT
Start: 2021-04-13 | End: 2021-04-15

## 2021-04-13 RX ORDER — METHOCARBAMOL 100 MG/ML
750 INJECTION, SOLUTION INTRAMUSCULAR; INTRAVENOUS EVERY 8 HOURS
Status: DISCONTINUED | OUTPATIENT
Start: 2021-04-13 | End: 2021-04-15

## 2021-04-13 RX ADMIN — POTASSIUM CHLORIDE AND SODIUM CHLORIDE: 900; 150 INJECTION, SOLUTION INTRAVENOUS at 06:35

## 2021-04-13 RX ADMIN — MAGNESIUM SULFATE HEPTAHYDRATE 2000 MG: 40 INJECTION, SOLUTION INTRAVENOUS at 17:11

## 2021-04-13 RX ADMIN — VANCOMYCIN HYDROCHLORIDE 1250 MG: 5 INJECTION, POWDER, LYOPHILIZED, FOR SOLUTION INTRAVENOUS at 13:55

## 2021-04-13 RX ADMIN — LEVETIRACETAM 500 MG: 100 INJECTION, SOLUTION INTRAVENOUS at 01:07

## 2021-04-13 RX ADMIN — ALBUTEROL SULFATE 2.5 MG: 2.5 SOLUTION RESPIRATORY (INHALATION) at 07:47

## 2021-04-13 RX ADMIN — LEVETIRACETAM 500 MG: 100 INJECTION, SOLUTION INTRAVENOUS at 13:28

## 2021-04-13 RX ADMIN — CEFEPIME 1000 MG: 1 INJECTION, POWDER, FOR SOLUTION INTRAMUSCULAR; INTRAVENOUS at 12:28

## 2021-04-13 RX ADMIN — KETOROLAC TROMETHAMINE 15 MG: 15 INJECTION, SOLUTION INTRAMUSCULAR; INTRAVENOUS at 18:57

## 2021-04-13 RX ADMIN — Medication 10 ML: at 12:29

## 2021-04-13 RX ADMIN — ALBUTEROL SULFATE 2.5 MG: 2.5 SOLUTION RESPIRATORY (INHALATION) at 11:43

## 2021-04-13 RX ADMIN — ALBUTEROL SULFATE 2.5 MG: 2.5 SOLUTION RESPIRATORY (INHALATION) at 01:18

## 2021-04-13 RX ADMIN — ALBUTEROL SULFATE 2.5 MG: 2.5 SOLUTION RESPIRATORY (INHALATION) at 19:18

## 2021-04-13 ASSESSMENT — PAIN SCALES - GENERAL: PAINLEVEL_OUTOF10: 8

## 2021-04-13 ASSESSMENT — PAIN SCALES - PAIN ASSESSMENT IN ADVANCED DEMENTIA (PAINAD)
BREATHING: 1
FACIALEXPRESSION: 2
BODYLANGUAGE: 1
NEGVOCALIZATION: 1
TOTALSCORE: 6

## 2021-04-13 ASSESSMENT — PAIN DESCRIPTION - PAIN TYPE: TYPE: ACUTE PAIN

## 2021-04-13 ASSESSMENT — PAIN DESCRIPTION - FREQUENCY: FREQUENCY: INTERMITTENT

## 2021-04-13 ASSESSMENT — PAIN DESCRIPTION - DESCRIPTORS: DESCRIPTORS: ACHING;DISCOMFORT

## 2021-04-13 NOTE — PROGRESS NOTES
Patient: Quentin Rea  Unit/Bed: C892/A732-07  YOB: 1960  MRN: [id number] Acct: [de-identified]   Admitting Diagnosis: SA[facility] (subarachnoid hemorrhage) ([facility] [I60.9]  Admit Date:  4/7/2021  Hospital Day: 6    Current Medications:    Scheduled Meds:   potassium chloride  10 mEq Intravenous Once    magnesium sulfate  2,000 mg Intravenous Once    albuterol  2.5 mg Nebulization Q6H    vancomycin  1,250 mg Intravenous Q12H    cefepime  1,000 mg Intravenous Q12H    vancomycin (VANCOCIN) intermittent dosing (placeholder)   Other RX Placeholder    bisacodyl  10 mg Rectal Every Other Day    glycerin (ADULT)  1 suppository Rectal Every Other Day    docusate sodium  100 mg Oral BID    senna  1 tablet Oral Nightly    levetiracetam  500 mg Intravenous Q12H    sodium chloride flush  10 mL Intravenous 2 times per day     Continuous Infusions:   0.9% NaCl with KCl 20 mEq 75 mL/hr at 04/13/21 0635    sodium chloride       PRN Meds:.hydrALAZINE, labetalol, bisacodyl, sodium chloride flush, sodium chloride, promethazine **OR** ondansetron, acetaminophen  .  0.9% NaCl with KCl 20 mEq 75 mL/hr at 04/13/21 0635    sodium chloride          Recent Labs     04/11/21  0456 04/12/21  0927 04/13/21  0454   WBC 7.8 15.0* 13.1*   HGB 11.7* 13.1* 12.3*   HCT 34.5* 38.4* 36.5*   MCV 89.2 88.4 88.9    199 206     Recent Labs     04/12/21  0449 04/12/21  1804 04/13/21  0454    136 138   K 2.6* 3.0* 3.7    102 104   CO2 25 27 23   BUN 7 9 11   CREATININE 0.29* 0.32* 0.29*     No results for input(s): AST, ALT, ALB, BILIDIR, BILITOT, ALKPHOS in the last 72 hours. No results for input(s): LIPASE, AMYLASE in the last 72 hours. No results for input(s): PROT, INR in the last 72 hours.     Imaging Results:    Xr Elbow Right (min 3 Views)    Result Date: 4/7/2021  COMPARISON: August 30, 2016 HISTORY:    fall PATIENT NAME: GUERRERO BEE KASEYT: TECHNIQUE: XR ELBOW RIGHT (MIN 3 VIEWS) FINDINGS: The joint spaces are reconstruction, and/or weight based dosing when appropriate to reduce radiation dose to as low as reasonably achievable. FINDINGS: Bifrontal hemorrhagic contusions measuring up to approximately 2 cm on the right, mild predominantly superolateral subarachnoid hemorrhage and up to 3 mm small left frontal subdural hematomas laterally and along the left side of the anterior falx appear unchanged There is no midline shift, hydrocephalus, or other significant changes identified. The mastoid air cells and visualized paranasal sinuses are essentially clear. STABLE ACUTE INTRACRANIAL HEMORRHAGES FROM YESTERDAY. NO EVIDENCE OF INTERVAL COMPLICATION IDENTIFIED. Ct Head Wo Contrast    Result Date: 4/8/2021  CT HEAD WO CONTRAST CLINICAL HISTORY: Bifrontal subdural hematoma with subarachnoid hemorrhage., Follow-up. Comparison: April 7, 2021 1838 hours TECHNIQUE: Multiple unenhanced serial axial images of the brain from the vertex of the skull to the base of the skull were performed. FINDINGS: The ventricles are dilated. Unchanged size configuration. Mo.  No mass. No midline shift. The cisterns are patent. There is developing area of hemorrhagic parenchymal contusion involving the left posterior inferior aspect of the parietal lobe. There is small subarachnoid hemorrhage noted. Again note is made of bifrontal subdural hematomas with extension of the subdural hematoma into the interhemispheric fissure. The left subdural hematoma extends overlying the left frontal lobe to the level of the posterior aspect of the left frontal lobe. There is associated bifrontal hemorrhagic contusions. The multiple parenchymal contusions are best appreciated on the coronal reconstructions series 601 image 34. There is been some interval enlargement of the right as compared to the prior examination.  There is now some increasing surrounding vasogenic edema There are now some scattered areas increased attenuation seen within the base of the left frontal lobe series 2 image 15 and small foci within the superior aspect of the left frontal lobe, series 2 image 2523. Either represent areas of hemorrhage. The visualized osseous structures are unremarkable. The visualized portion of the paranasal sinuses, and mastoids are unremarkable. IMPRESSION 1. INTERVAL DEVELOPMENT OF ACUTE HEMORRHAGIC PARENCHYMAL CONTUSIONS WITH SMALL SUBARACHNOID COMPONENTS IN THE LEFT POSTERIOR INFERIOR PARIETAL REGION. 2. THERE IS SMALL AREAS OF NEW INCREASED ATTENUATION DESCRIBED ABOVE WITHIN THE LEFT FRONTAL LOBE. FINDINGS REPRESENT SMALL AREAS OF PARENCHYMAL CONTUSION, HEMORRHAGE. 3. BIFRONTAL SUBDURAL HEMATOMAS WITH ASSOCIATED PARENCHYMAL HEMORRHAGIC CONTUSIONS AS DESCRIBED ABOVE. RECOMMEND MRI TO FURTHER EVALUATE All CT scans at this facility use dose modulation, iterative reconstruction, and/or weight based dosing when appropriate to reduce radiation dose to as low as reasonably achievable. Ct Head Wo Contrast    Result Date: 4/7/2021  COMPARISON: September 18, 2020. HISTORY:  fell off a wheel chair slight hematoma  occiput TECHNIQUE: The study was performed without contrast FINDINGS: In the bifrontal regions there is hyperdensity seen in the sulci and falx. The ventricular system is midline with no evidence for hydrocephalus. There is soft tissue prominence also seen The visualized paranasal sinuses and orbits appear unremarkable. No lytic or blastic changes seen in the calvarium. Bifrontal subarachnoid hemorrhage is seen. This was discussed with Dr. [facility] on today's date at 1:35 All CT scans at this facility use dose modulation, iterative reconstruction, and/or weight based dosing when appropriate to reduce radiation dose to as low as reasonably achievable.      Cta Neck W Wo Contrast    Result Date: 4/12/2021  EXAMINATION: CTA neck CLINICAL HISTORY: Subarachnoid hemorrhage FINDINGS: Study was performed after the patient received 300 mL of Isovue-300. 2-D and 3-D reconstructions of the extracranial carotid vasculature obtained from the arch to the skull base. Right carotid vasculature: Right common carotid artery, bifurcation, right internal and external carotid arteries widely patent free of significant stenosis. Left carotid vasculature: Left common carotid artery, bifurcation, left internal and external carotid arteries widely patent from the arch to the skull base. Vertebral arteries: Left vertebral artery dominant vessel. No focal stenosis in the vertebral vasculature. Nondisplaced fracture through the posterior right occipital bone just off the midline noted. Severe degenerative changes overlie the mid cervical spine most prominent at the C4-C5 level subjacent to the fused C5-C7 vertebral bodies. UNREMARKABLE CTA OF THE NECK. NASCET CRITERIA EMPLOYED. Ct Cervical Spine Wo Contrast    Result Date: 4/12/2021  EXAMINATION: CT CERVICAL SPINE WO CONTRAST   DATE AND TIME:4/7/2021 6:44 PM CLINICAL HISTORY:Acute posterior neck pain  trauma  COMPARISON: None TECHNIQUE:Helical scanning was performed from the skull base through the remainder of the cervical spine without intravenous contrast. Sagittal and coronal reformats were obtained. The lack of contrast limits CT sensitivity of the soft tissues. All CT scans at this facility use dose modulation, iterative reconstruction, and/or weight based dosing when appropriate to reduce radiation dose to as low as reasonably achievable. [facility] FINDINGS  Severe motion artifact limits exam quality inaccuracy Fracture:Extreme motion artifact motion artifact. No obvious fracture. Consider plain film radiography the C-spine if clinical concerns persist Dense atlas:Dens atlas relationship is unremarkable. Soft tissues:Airway patent. No soft tisssue mass or adenopathy. Other findings: Normal age-related bony changes. Extreme motion artifact. No definite fracture.    EXAMINATION: CT CERVICAL SPINE WO CONTRAST  DATE AND TIME:4/7/2021 6:44 PM CLINICAL HISTORY: Severe headache.  trauma  COMPARISON: None TECHNIQUE:Axial CT from skull base to vertex without  contrast..  All CT scans at this facility use dose modulation, iterative reconstruction, and/or weight based dosing when appropriate to reduce radiation dose to as low as reasonably achievable. FINDINGS. Bilateral frontal areas of hemorrhagic contusion with bifrontal frontal subarachnoid and subdural hemorrhage. Small subdural hemorrhages extend along the inner calvarium bilaterally extending approximately 4 mm deep to the inner calvarium. There is no midline shift. Small amount of subdural blood along the anterior falx. Small focus of hemorrhagic contusion along the left anterior temporal lobe. No intraventricular blood. No significant parenchymal edema demonstrated at this time. No other areas of acute hemorrhage. 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 . Globes intact. No acute fracture. Paranasal sinuses and mastoids are clear. These findings were discussed with the ER doctor IMPRESSION: BIFRONTAL HEMORRHAGIC CONTUSIONS WITH SMALL BILATERAL SUBDURAL HEMATOMAS. NO ACUTE FRACTURE     Xr Chest Portable    Result Date: 4/13/2021  EXAMINATION: XR CHEST PORTABLE CLINICAL HISTORY: FEEDING TUBE PLACEMENT COMPARISONS: APRIL 13, 2021, 0447 HOURS, APRIL 12, 2020 FINDINGS: Feeding tube now identified with tip in caudal esophagus. Degenerative change left glenohumeral joint. Cardiopericardial silhouette normal. Blunting right costophrenic angle with ill-defined area of increased opacity identified right lower lobe. Increased opacity also visualized left midlung. TIP OF FEEDING TUBE IN CAUDAL ESOPHAGUS RECOMMEND ADVANCING FEEDING TUBE 20 CM. RIGHT PLEURAL EFFUSION WITH RIGHT LOWER LOBE PNEUMONIA. LEFT MIDLUNG PNEUMONIA. ABOVE FINDINGS DISCUSSED TELEPHONE WITH PATIENT'S NURSE [id number] [facility], APRIL 13, 2021, AT 1010 HOURS.     Xr Chest Portable    Result Date: 4/13/2021  Exam: XR CHEST PORTABLE History: Pneumonia Technique: AP portable view of the chest obtained. Comparison: Portable chest radiograph from April 12, 2020 Findings: The cardiomediastinal silhouette is within normal limits. Interval progression of left midlung airspace opacities. Right lung opacities and ossific change. Question small right pleural effusion. No pneumothorax. Degenerative changes of the spine and shoulders. No acute osseous abnormality. Interval progression of left lung infiltrate. No significant interval change of right lung infiltrate and possible small right pleural effusion. Xr Chest Portable    Result Date: 4/12/2021  Exam: XR CHEST PORTABLE History: Corpak placement Technique: AP portable view of the chest obtained. Comparison: Radiographs from earlier on the same date Findings: Corpak is not visualized. The cardiomediastinal silhouette is within normal limits. Unchanged bilateral airspace opacities. Question small right pleural effusion. No pneumothorax. No acute osseous abnormality. Degenerative changes of the spine. No significant interval change in bilateral infiltrate. Xr Chest Portable    Result Date: 4/12/2021  EXAMINATION: XR CHEST PORTABLE CLINICAL HISTORY: FEEDING TUBE PLACEMENT COMPARISONS: CHEST RADIOGRAPH APRIL 11, 2021 FINDINGS: No feeding tube is identified on the current study. Thoracic scoliosis convexity to right apex T6-T7. Interval increase in opacity found in right mid and right lower lung, with blunting right costophrenic angle. Portion of right cardiac silhouette, and right diaphragm now obscured. Ill-defined area of increased opacity visualized left mid lung, with surgical clips also visualized left midlung zone, unchanged. INTERVAL INCREASE IN RIGHT MIDDLE AND RIGHT LOWER LOBE PNEUMONIA VERSUS ASPIRATION. SMALL RIGHT PLEURAL EFFUSION. LEFT MIDLUNG ATELECTASIS/PNEUMONIA VERSUS POSTSURGICAL CHANGE.     Xr Chest Portable    Result Date: 4/12/2021  EXAMINATION: XR CHEST PORTABLE REASON FOR EXAM: tachypnea FINDINGS: Frontal view of the chest reveals patchy subsegmental infiltrate developing at the right base suspicious for pneumonia. There is bibasilar parenchymal scarring noted as well. Heart normal in size. Central pulmonary vasculature within normal limits. RIGHT LOWER LOBE PNEUMONIA/ATELECTASIS.       BP (!) 161/86   Pulse 79   Temp 98.4 °F (36.9 °C) (Axillary)   Resp 18   Ht 6' 1\" (1.854 m)   Wt 137 lb 9.1 oz (62.4 kg)   SpO2 98%   BMI 18.15 kg/m²     Sleeping when seen. But easily arousable with voice. Keeps his eyes open. Pupils equal reactive. Answering simple questions yes and no. Following commands with tongue protrusion and moving upper extremities. Status post fall with the traumatic anterior subdural and intraparenchymal hematoma. Stable. Neurosurgical standpoint patient again is stable with improving neurological status. On antibiotics per primary for pneumonia as well as UTI. From neurosurgical standpoint, patient can go to rehab whenever patient is cleared from medically.     Would recommend 1 week total of Keppra

## 2021-04-13 NOTE — CARE COORDINATION
Met with patient's wife regarding inpatient rehab referral, program explained and all questions answered. Freedom of choice offered, wife states she needs to discuss options with patient's family as he has been to [facility] before and that might be their choice. [employer] and contact info provided. Will continue to follow.  Electronically signed by Radames Garcia RN on 4/13/21 at 11:41 AM EDT

## 2021-04-13 NOTE — PROGRESS NOTES
TRAUMA DAILY PROGRESS NOTE      Patient Name: Willow Kingston  Admission Date 4/7/2021    Hospital Day: 6  Patient seen and examined on 4/13/2021    INTERVAL HISTORY/EVENTS     Background:  Jake Gipson is a 62 y. o. male with a PMHx of paraplegia presented as a transfer from [facility] on 4/7/21 after he was found to have bifrontal subarachnoid hemorrhage. Per reports patient was found down at his home by a [employer] . It was suspected that he feel backwards out of his wheelchair. Upon arrival to [facility] [place] [facility], pt noted to be behavioral, impulsive, and altered from baseline mentation. A&Ox1 (self), pulling at line, and physically/verbally combative with staff requiring Ativan and soft restraints. Per wife, his baseline mentation is A&Ox4 and he holds a job with [employer]. He was admitted to the Trauma Service with Neurosurgery (Dr. Luba Espinoza) consulted.  [facility] Course:  4/7/2021: Found to have bifrontal SAH at [facility] and transferred to [facility] s/p found down at home by [employer] . AMS and hyponatremia noted upon arrival. NSGY consulted. 4/8/2021: Precedex gtt started for impulsivity and safety. Remains A&Ox0, GCS 2/3/5. [address] with interval development of bifrontal SAH and new area of SAH at left parietal region. 4/9/2021: Repeat CT head completed-stable. Precedex gtt weaned off but had to be resumed in the evening due to agitation/restlessness.   4/10/2021: Improving mentation. Precedex infusion discontinued. Transferred to the [facility]  4/11/2021: Worsening mentation on AM examination. Repeat CT head completed with slight increase in L frontal SDH/hygroma but otherwise stable. Required placement of O2 overnight due to drop in O2 saturations to 97%. CXR ordered by NSGY with concern for RLL consolidation/PNA  4/12/2021: New leukocytosis and O2 requirements overnight with desaturation. Continued poor neuro exam. Abx initiated for PNA.     24 Hour Events:  Overnight, patient's wife requested transfer of patient HEENT: Atraumatic normocephalic. Corpak placed this AM at 50cm and advanced to 70cm. Cardiovascular: Regular rate and rhythm. Palpable radial and DP pulses bilaterally. Pulmonary: Rhonchi noted throughout bilaterally upper> lower. Diminished in bilateral bases. No wheezing or rales. Increased work of breathing appreciated this AM.  Acceptable O2 saturations on nasal cannula. Abdominal: Soft. Non-distended. Non-tender. : Indwelling toscano catheter in place (chronic) , draining clear, yellow urine.   Musculoskeletal: Moves BUE spontaneously and to command with persistence, no motor in BLE at baseline secondary to prior SCI.  No peripheral edema appreciated. Neurological: Sleepy, arouses to verbal stimuli. Speech slightly improved from yesterday's documentation. Provides appropriate/brief responses to Trauma Staff. Clearly state name and birth date. Notes \"location\" as [place]. Does not respond to year. Face grossly symmetric. PERRL. EOMI. Able to follow commands with BUE this AM but decreased strength appreciated from prior examinations. Hands contracted/spastic at baseline.  No motor in BLE secondary to prior SCI.  Sensation intact to BUE.   Skin: Warm and dry.     LABORATORY RESULTS (LAST 24 HOURS)     CBC with Differential:    Lab Results   Component Value Date    WBC 13.1 04/13/2021    RBC 4.10 04/13/2021    HGB 12.3 04/13/2021    HCT 36.5 04/13/2021     04/13/2021    MCV 88.9 04/13/2021    MCH 30.0 04/13/2021    MCHC 33.8 04/13/2021    RDW 13.7 04/13/2021    BANDSPCT 20 04/12/2021    METASPCT 3 04/12/2021    LYMPHOPCT 3.0 04/13/2021    MONOPCT 3.5 04/13/2021    BASOPCT 0.0 04/13/2021    MONOSABS 0.5 04/13/2021    LYMPHSABS 0.4 04/13/2021    EOSABS 0.0 04/13/2021    BASOSABS 0.0 04/13/2021     CMP:    Lab Results   Component Value Date     04/13/2021    K 3.7 04/13/2021     04/13/2021    CO2 23 04/13/2021    BUN 11 04/13/2021    CREATININE 0.29 04/13/2021    GFRAA >60.0 04/13/2021 4/8/21)        ASSESSMENT/PLAN:  Neurological:   Bifrontal subarachnoid hemorrhage. Given 500cc bolus of Keppra at [facility] with interval development of bifrontal contusions and new area of SAH at left parietal region as well as bifrontal SDH, stable on subsequent imaging. Per wife, patient with chronic back pain and worsened at baseline when in supine position. Occipital skull fracture extending to the foramen magnum. CTA negative for BCVI. - NSGY consulted, appreciate recs: continue with keppra x 1 week, change to PO when able, Q4 neuro checks   - Continue IV Keppra 500mg BID x 1 week-transition to PO when patient more awake (end date 4/14, afternoon dose)  - Continue seizure precautions  - SLP consulted for cognitive evaluation and swallow evaluation: repeat eval on 4/13 consistent with previous eval. Continue NPO due to aspiration risk. Not appropriate for MBS at this time. - Continue PO Tylenol 650mg q 4hrs prn mild/moderate pain  - Avoiding opioid medications in the setting of worsening mental status on 4/12     Cardiovascular:   No acute cardiovascular issues. BP's have been acceptable with no tachycardia appreciated.   - Continue IV Hydralazine and Labetalol PRN SBP greater than 160mmHg (not required in last 24 hrs)     Respiratory:   Maintained appropriate saturations of 92-98% on 3L NC overnight. Increased supplemental O2 on 4/12 correlated with PO intake and patient pocketing food. CXR completed 4/11 with RLL consolidation and interval progression of left lung infiltrate and concern for aspiration PNA. - Maintain O2 sat >92%  - Continue pulmonary toilet  - Vest therapy Q6h scheduled with albuterol treatments  - Repeat CXR in AM      GI/Diet:  Bedside swallow completed 4/10 and repeat eval on 4/13 by SLP with diet recommendations.  Patient with worsening mental status and concern for aspiration.  - Maintain NPO in setting of worsening mental status an concern for aspiration. Corpak placed this AM, maintain.  - Start Tube feeds at 25mL/hr for 24hrs. Goal of 50mL/hr.  - Repeat SLP swallow evaluation once mentation improved  - Continue bowel regimen (colace BID, senna QD, dulcolax suppository QOD with digital stimulation)     Renal/Electrolytes:   Hyponatremia resolved, hypokalemic improved this AM but still at 3.7. Mag at 1.7. Chronic indwelling toscano catheter for neurogenic bladder in the setting of prior spinal cord injury.    - Decrease NS to 80cc/hr in setting of NPO  - Replete electrolytes PRN-give additional 2 grams IV Mag and 10mq KCl IV  - Repeat BMP/Mag in AM  - Maintain indwelling toscano catheter for history of neurogenic bladder     ID:   Patient with improving leukocytosis on AM CBC. Remains afebrile. CXR with RLL and left lobe consolidation concerning for PNA. UA and urine cultures positive for Gm (-) bacteria and nitrites but patient does have history of chronic indwelling catheter.  - Continue Vanco and cefepime.  - Pharmacy consult for vanco dosing  - Daily CBC for now      Heme:   No acute hematologic issues. H and H remains acceptable. - No indication for transfusion.  - Repeat CBC in AM to trend H and H.      Endocrine:   No acute glycemic/endocrine issues.     MSK:   PMHx of paraplegia.   - Activity: OOB to chair TID  - Spines: Clear  - Weight bearing restrictions: None  - PT/OT: Attempted evaluations on prior days but unable to complete secondary to mental status.   Appreciate PT/OT for passive ROM/exercises     Tubes/Lines/Drains:  -Maintain PIVs  -Maintain toscano catheter.  Patient has chronic, indwelling toscano catheter that he presented to hospital with, for history of neurogenic bladder in the setting of spinal cord injury.      Prophylaxis:   - SCDs only, No chemo PPx given bleed risk and most recent CT head only showed stability on 4/6/[id number]-tony discuss timing of DVT chemoprophylaxis with NSGY  - No GI PPx indicated     Dispo: Continue care on RNF while optimizing respiratory status, monitoring neurological status and for initiation of IV antibiotics for presumed PNA. Will need eventual PT/OT evaluations to assist with appropriate dispo planning. Patient's wife, Higinio Matthews at bedside during evaluation. She was updated on patient's condition and ongoing plan of care.   All questions were answered.      Follow up with Neurosurgery (Dr. Alyssa Ramos) in TBD  No Trauma follow up needed        Please call for any questions or concerns.       Marshel Buerger, [address]  Emergency General Surgery  [phone] (1g-1p) [phone]       This patient's plan of care was discussed and made in collaboration with Trauma Attending physician, Quan Wagner MD.

## 2021-04-13 NOTE — PROGRESS NOTES
Diagnosis Date    Bursitis of shoulder, left 04/29/2008    acute    Hemorrhoids, external 08/05/2004    Hemorrhoids, internal 07/18/2003    Paraplegia ([facility] [address] 04/29/2008    permanent    Quadriplegia, C5-C7, complete ([facility] [address]     Spinal cord injury 07/23/2009    with paraplegia     Past Surgical History:   Procedure Laterality Date    COLONOSCOPY  07/18/2003    HEMORRHOID SURGERY  08/05/2004    Dr. Yaakov Sawant  07/2008    left/Dr. Ambrosio Retort        Restrictions  Restrictions/Precautions: Fall Risk     Safety Devices: Safety Devices  Safety Devices in place: Yes  Type of devices: All fall risk precautions in place        Subjective  Pre Treatment Pain Screening  Pain at present: 6  Scale Used: Numeric Score  Intervention List: Patient able to continue with treatment    Pain Reassessment:   Pain Assessment  Patient Currently in Pain: Yes  Pain Assessment: 0-10  Pain Level: 6  Pain Type: Acute pain  Pain Location: Arm  Pain Orientation: Left  Pain Descriptors: Aching, Discomfort  Pain Frequency: Intermittent       Prior Level of Function:  Social/Functional History  Lives With: Spouse  Type of Home: House  Home Layout: One level  Home Access: Level entry  Bathroom Shower/Tub: Walk-in shower, Shower chair with back  Bathroom Toilet: Handicap height  Bathroom Equipment: Shower chair, Grab bars in shower  Bathroom Accessibility: Wheelchair accessible  Home Equipment: [employer]  ADL Assistance: Independent  Homemaking Assistance: Independent  Ambulation Assistance: (manual w/c)  Transfer Assistance: Independent  Active : Yes  Occupation: Full time employment  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  Does have lift for the car - information obtained from pt and from record    OBJECTIVE:     Orientation Status:  Orientation  Overall Orientation Status: Impaired  Orientation Hand General: 3-/5  RUE Strength Comment: 3-/5 chest press    Coordination, Tone, Quality of Movement:    Tone RUE  RUE Tone: Hypertonic  Tone LUE  LUE Tone: Hypertonic  Coordination  Coordination and Movement description: Fine motor impairments, Gross motor impairments, Decreased accuracy, Right UE, Left UE    Hand Dominance:  Hand Dominance  Hand Dominance: Right    ADL Status:  ADL  Feeding: NPO  Grooming: Verbal cueing, Maximum assistance  UE Bathing: Verbal cueing, Moderate assistance  LE Bathing: Dependent/Total, Verbal cueing  UE Dressing: Maximum assistance, Verbal cueing  LE Dressing: Dependent/Total, Verbal cueing  Toileting: Dependent/Total  Additional Comments: Simulated all ADLs as above  Toilet Transfers  Toilet Transfer: Unable to assess  Toilet Transfers Comments: NT for safety       Therapy key for assistance levels -   Independent = Pt. is able to perform task with no assistance but may require a device   Stand by assistance = Pt. does not perform task at an independent level but does not need physical assistance, requires verbal cues  Minimal, Moderate, Maximal Assistance = Pt. requires physical assistance (25%, 50%, 75% assist from helper) for task but is able to actively participate in task   Dependent = Pt. requires total assistance with task and is not able to actively participate with task completion     Functional Mobility:  Functional Mobility  Functional Mobility Comments: NT for safety  Transfers  Sit to stand: Unable to assess  Stand to sit: Unable to assess  Transfer Comments: NT for safety    Bed Mobility  Bed mobility  Supine to Sit: Dependent/Total, 2 Person assistance  Sit to Supine: Dependent/Total, 2 Person assistance  Comment: Pt able to assist with scooting but demonstrates increased difficulty to follow commands, requires hand over hand assist    Seated and Standing Balance:  Balance  Sitting Balance: Dependent/Total(x2)  Standing Balance: Dependent/Total    Functional Endurance:  Activity Tolerance  Activity Tolerance: Treatment limited secondary to decreased cognition  Activity Tolerance: fair    D/C Recommendations:  OT D/C RECOMMENDATIONS  REQUIRES OT FOLLOW UP: Yes    Equipment Recommendations:  OT Equipment Recommendations  Other: continue to assess    OT Education:   OT Education  OT Education: OT Role, Plan of Care    OT Follow Up:  OT D/C RECOMMENDATIONS  REQUIRES OT FOLLOW UP: Yes       Assessment/Discharge Disposition:  Assessment: Pt is a 61 y/o previously independent at w/c level who presents to [facility] with the above deficits which impact his independence during ADLs and IADLs. Pt would benefit from OT services to maximize independence and safety during ADLs and IADLs.   Performance deficits / Impairments: Decreased functional mobility , Decreased strength, Decreased endurance, Decreased coordination, Decreased high-level IADLs, Decreased ADL status, Decreased ROM, Decreased cognition, Decreased balance, Decreased fine motor control, Decreased posture  Prognosis: Fair  Discharge Recommendations: Continue to assess pending progress  Decision Making: High Complexity  History: Pt's medical history is moderately complex  Exam: 12 perf deficits  Assistance / Modification: Dependent x2    Six Click Score   How much help for putting on and taking off regular lower body clothing?: Total  How much help for Bathing?: A Lot  How much help for Toileting?: Total  How much help for putting on and taking off regular upper body clothing?: A Lot  How much help for taking care of personal grooming?: A Lot  How much help for eating meals?: A Lot  AM-[facility] Inpatient Daily Activity Raw Score: 10  AM-PAC Inpatient ADL T-Scale Score : 27.31  ADL Inpatient CMS 0-100% Score: 74.7    Plan:  Plan  Times per week: 1-3x  Plan weeks: length of acute stay  Current Treatment Recommendations: Strengthening, Functional Mobility Training, Cognitive Reorientation, Patient/Caregiver Education & Training, Home Management Training, Positioning, Cognitive/Perceptual Training, Endurance Training, ROM, Equipment Evaluation, Education, & procurement, Neuromuscular Re-education, Safety Education & Training, Self-Care / ADL    Goals:   Patient will:    - Improve functional endurance to tolerate/complete 60 mins of ADL's  - Be SBA in UB ADLs   - Be Mod A in LB ADLs  - Be Mod A in ADL transfers without LOB  - Be Mod A in toileting tasks  - Improve B hand fine motor coordination to [facility] in order to manage clothing fasteners/self-care containers in a timely manner  - Improve B UE strength and endurance to [facility] in order to participate in self-care activities as projected. - Access appropriate D/C site with as few architectural barriers as possible.   - Sequence self-care tasks with min verbal cues    Patient Goal: Patient goals : \"to be independent\"     Discussed and agreed upon: Yes Comments:     Therapy Time:   OT Individual Minutes  Time In: 0943  Time Out: 4452  Minutes: 20    Eval: 20 minutes     Electronically signed by:    DOMINICK Sommer  4/13/2021, 4:33 PM

## 2021-04-13 NOTE — PROGRESS NOTES
Physical Therapy Med Surg Initial Assessment  Facility/Department: [address] [place] [address] Ricardo Garcia [address]  Room: [place] [id number]       NAME: Meek Arrington  : 1960 (62 y.o.)  MRN: [id number]  CODE STATUS: Full Code    Date of Service: 2021    Patient Diagnosis(es): SAH (subarachnoid hemorrhage) [facility]) [I60.9]   Chief Complaint   Patient presents with    Head Injury     transfer from [facility] for Head injury SA bleed     Patient Active Problem List    Diagnosis Date Noted    Acute cystitis with hematuria 2021    Aspiration pneumonia ([address] 2021    Leukocytosis 2021    Dysphagia, oropharyngeal phase 2021    Right lower lobe pneumonia 2021    Hypomagnesemia 2021    C7 spinal cord injury ([address] 04/10/2021    Cognitive change 04/10/2021    Neurogenic bowel 04/10/2021    Neurogenic bladder 04/10/2021    Acute pain due to trauma 04/10/2021    Acute metabolic encephalopathy     Hypokalemia 04/10/2021    SAH (subarachnoid hemorrhage) ([address] 2021    Pulmonary nodule, left 2019    Dysuria 2018    Urinary tract infection without hematuria 2018    Bilateral presbyopia 10/19/2017    Family history of glaucoma in father 10/19/2017    Myopia, bilateral 10/19/2017    Pressure ulcer of contiguous site of back, buttock and hip, stage 2 ([address] 2016    Severe protein-calorie malnutrition ([address] 2016    Restrictive lung disease 2016    Depression 2014    Low testosterone 2014    Osteopenia 2014    Shoulder arthritis 2014    Hypertrophy of nasal turbinates 2013    Aspiration into respiratory tract 10/24/2013    Neuromuscular respiratory weakness 10/24/2013    Chronic ethmoidal sinusitis 07/10/2013    Bilateral hand pain 2013    Wrist pain, chronic 2013    Right arm weakness 2013    Hemorrhoids, internal     Hemorrhoids, external     Bursitis of shoulder, left         Past Medical History:   Diagnosis Date    Bursitis of shoulder, left 04/29/2008    acute    Hemorrhoids, external 08/05/2004    Hemorrhoids, internal 07/18/2003    Paraplegia ([facility] 04/29/2008    permanent    Quadriplegia, C5-C7, complete ([facility]     Spinal cord injury 07/23/2009    with paraplegia     Past Surgical History:   Procedure Laterality Date    COLONOSCOPY  07/18/2003    HEMORRHOID SURGERY  08/05/2004    Dr. Rachael Valiente  07/2008    left/Dr. Karyle Carl       Chart Reviewed: Yes  Family / Caregiver Present: No  General Comment  Comments: Pt resting in bed - agreeable to PT evaluation    Restrictions:  Restrictions/Precautions: Fall Risk     SUBJECTIVE: Subjective: \"I'm a C6-C7 spinal cord injury. I'm very independent. \" Pt repeating this throughout assessment. Generally garbled speech. Pain  Pre Treatment Pain Screening  Pain at present: 6  Scale Used: Numeric Score  Intervention List: Patient able to continue with treatment;Patient declined any intervention;Nurse/physician notified  Comments / Details: \"all over\"    Post Treatment Pain Screening:   Pain Assessment  Pain Assessment: (unchanged)    Prior Level of Function:  Social/Functional History  Lives With: Spouse  Type of Home: House  Home Layout: One level  Home Access: Level entry  Bathroom Shower/Tub: Walk-in shower, Shower chair with back  Bathroom Toilet: Handicap height  Bathroom Equipment: Shower chair, Grab bars in shower  Bathroom Accessibility: Wheelchair accessible  Home Equipment: [employer]  ADL Assistance: Independent  Homemaking Assistance: Independent  Ambulation Assistance: (Manual WC)  Transfer Assistance: Independent  Active : Yes  Occupation: Full time employment  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  Does have lift for the car - information obtained from pt and from record    OBJECTIVE: Vision: (Pt attends to full field of vision with Max cues. Poor gaze stability. Poor visual attention.)  Hearing: Within functional limits    Cognition:  Orientation Level: Disoriented to time, Disoriented to situation, Disoriented to place, Oriented to person  Follows Commands: Impaired    Observation/Palpation  Observation: No acute distress noted. Pt lethargic, requiring frequent cues for attention to task. Rancho V level. ROM:  RLE PROM: WFL  LLE PROM: WFL  RUE General PROM: flexion contractures at hand  RUE General AROM: stiffness noted throughout shoulder (elevation)  LUE General PROM: flexion contractures at hand  LUE General AROM: stiffness noted throughout shoulder (elevation)    Strength:  Strength RLE  Comment: flaccid paralysis  Strength LLE  Comment: flaccid paralysis  Strength RUE  Comment: 2/5 wrist; 3-/5 elbow; 3/5 shoulder depression  Strength LUE  Comment: 2/5 wrist; 3-/5 elbow; 3/5 shoulder depression  Strength Other  Other: Lacking gross muscular tone of trunk. Is able to demonstrate trace scapular retraction L>R. Neuro:  Balance  Sitting - Static: Poor  Comments: Pt sits EOB with depenent support from therapist. Attempting to place UEs for propping, however pt unable to follow instruction, achieve position or sustain attention. Motor Control  Gross Motor?: (decreased attention)  Sensation  Overall Sensation Status: (Pt reporting no sensation B LEs)    Bed mobility  Rolling to Left: Dependent/Total;2 Person assistance  Rolling to Right: 2 Person assistance  Supine to Sit: Dependent/Total;2 Person assistance  Sit to Supine: Dependent/Total;2 Person assistance  Scootin Person assistance;Maximal assistance(cranially in bed)  Comment: Pt unable to follow commands effectively to assist in transitions. Able to assist with scooting up in bed with Max cues, hand over hand assist, and multiple attempts. Transfers  Comment: Unsafe at this time.  Attempted seated push up to initiate lateral scooting, however pt unable to follow instruction effectively. Also lacking in gross trunk control. Ambulation  Ambulation?: No    Stairs/Curb  Stairs?: No         Activity Tolerance  Activity Tolerance: Patient limited by cognitive status          PT Education  PT Education: Goals;PT Role;Plan of Care    ASSESSMENT:   Body structures, Functions, Activity limitations: Decreased functional mobility ; Decreased safe awareness;Decreased cognition;Decreased coordination;Decreased balance;Decreased strength;Decreased endurance; Increased pain;Decreased posture  Decision Making: High Complexity  History: High  Exam: High  Clinical Presentation: High    Prognosis: Fair;Good  Barriers to Learning: cog    DISCHARGE RECOMMENDATIONS:  Discharge Recommendations: Continue to assess pending progress, Patient would benefit from continued therapy after discharge    Assessment: Continued PT indicated to progress mobility and facilitate DC at highest level of indep and safety. REQUIRES PT FOLLOW UP: Yes      PLAN OF CARE:  Plan  Times per week: 3-6  Current Treatment Recommendations: Strengthening, ROM, Balance Training, ADL/Self-care Training, Transfer Training, Functional Mobility Training, Gait Training, Stair training, Endurance Training, Neuromuscular Re-education, Home Exercise Program, Safety Education & Training, Patient/Caregiver Education & Training, Equipment Evaluation, Education, & procurement, Positioning  Safety Devices  Type of devices:  All fall risk precautions in place    Goals:  Patient goals : none stated at this time  Short term goals  Short term goal 1: Pt to sit EOB with ModA X 3min  Long term goals  Long term goal 1: Pt to complete slideboard transfer bed<>WC with ModA  Long term goal 2: Pt to complete supine rolling with ModA upper body, and MaxA lower body  Long term goal 3: Pt to complete transition sit<>supine with ModA    AMPAC (6 CLICK) BASIC MOBILITY  AM-PAC Inpatient Mobility Raw Score : 6 Therapy Time:   Individual   Time In 1500   Time Out 1538   Minutes 821 [address], 04/13/21 at 4:08 PM         Definitions for assistance levels  Independent = pt does not require any physical supervision or assistance from another person for activity completion. Device may be needed.   Stand by assistance = pt requires verbal cues or instructions from another person, close to but not touching, to perform the activity  Minimal assistance= pt performs 75% or more of the activity; assistance is required to complete the activity  Moderate assistance= pt performs 50% of the activity; assistance is required to complete the activity  Maximal assistance = pt performs 25% of the activity; assistance is required to complete the activity  Dependent = pt requires total physical assistance to accomplish the task

## 2021-04-13 NOTE — PROGRESS NOTES
Subjective: The patient complains of severe acute on chronic progressive fatigue and confusion partially relieved by rest, PT, OT and meds and exacerbated by exertion and recent illness. I am concerned about patients medical complexities-bleeding critically low potassium-at 2.6. Now improved. Please been on antibiotics for infection and sepsis White blood cell count now is 13-down from 15 yesterday. I called his PCP at the [facility] clinic and let them know what was going on. They seem to have a vague idea who is apparently he has not seen his family doctor in 2 years. I discussed the need for PEG tube with his attending physician Dr. Anurag Mead. We agree he will likely need a PEG tube in the future. There is a question of whether [facility] today we are actually recommending that he be stabilized here and go to acute rehab. They are here at [facility]. Reviewed recent nursing note and discussed current status and planned care with acute care providers, \" Patients wife called wanting patient to transfer to [facility] because of concerns with his care. Trauma physician notified of the request. Physician stated that the request can be looked into in the morning\"         \". Has been pocketing food and needing suctioning he is possibly n.p.o. but he had a tight swallow which cleared him for purées and thins-he likely needs a modified barium swallow    ROS x10: The patient also complains of severely impaired mobility and activities of daily living. Otherwise no new problems with vision, hearing, nose, mouth, throat, dermal, cardiovascular, GI, , pulmonary, musculoskeletal, psychiatric or neurological. See Rehab consult on Rehab chart .        Vital signs:  BP (!) 161/86   Pulse 79   Temp 98.4 °F (36.9 °C) (Axillary)   Resp 18   Ht 6' 1\" (1.854 m)   Wt 137 lb 9.1 oz (62.4 kg)   SpO2 98%   BMI 18.15 kg/m²   I/O:   PO/Intake:  NPO--dt pvinrklen-[id number]-ZhhDvs placed for feeding    Bowel/Bladder:  incontinent,  Dey-UTI versus colonization  General:  Patient is well developed, adequately nourished, non-obese and     well kempt. HEENT:    PERRLA, hearing intact to loud voice, external inspection of ear     and nose benign. Inspection of lips, tongue and gums benign  Musculoskeletal: No significant change in strength or tone. All joints stable. Inspection and palpation of digits and nails show no clubbing,       cyanosis or inflammatory conditions. Neuro/Psychiatric: Affect: flat-   lethargic right gaze preference and oriented to self and     Situation with max cues. No significant change in deep tendon reflexes or     sensation  Lungs:  Diminished, CTA-B. Respiration effort is normal at rest.  Right lower lobe pneumonia  Heart:   S1 = S2,   RRR. No loud murmurs. Abdomen:  Soft, non-tender, no enlargement of liver or spleen. Extremities:  No significant lower extremity edema or tenderness. Skin:    BUE bruises dt blood draws, no visualized or palpated problems.     Rehabilitation:  Physical therapy: FIMS:  Bed Mobility:      Transfers:  ,  ,      FIMS:  ,  ,      Occupational therapy: FIMS:   ,  ,      Speech therapy: FIMS:        Lab/X-ray studies reviewed, analyzed and discussed with patient and staff:   Recent Results (from the past 24 hour(s))   Basic Metabolic Panel    Collection Time: 04/12/21  6:04 PM   Result Value Ref Range    Sodium 136 135 - 144 mEq/L    Potassium 3.0 (LL) 3.4 - 4.9 mEq/L    Chloride 102 95 - 107 mEq/L    CO2 27 20 - 31 mEq/L    Anion Gap 7 (L) 9 - 15 mEq/L    Glucose 116 (H) 70 - 99 mg/dL    BUN 9 6 - 20 mg/dL    CREATININE 0.32 (L) 0.70 - 1.20 mg/dL    GFR Non-African American >60.0 >60    GFR  >60.0 >60    Calcium 7.8 (L) 8.5 - 9.9 mg/dL   Basic Metabolic Panel w/ Reflex to MG    Collection Time: 04/13/21  4:54 AM   Result Value Ref Range    Sodium 138 135 - 144 mEq/L    Potassium reflex Magnesium 3.7 3.4 - 4.9 mEq/L    Chloride 104 95 - 107 mEq/L    CO2 23 20 - 31 mEq/L    Anion Gap 11 9 - 15 mEq/L    Glucose 111 (H) 70 - 99 mg/dL    BUN 11 6 - 20 mg/dL    CREATININE 0.29 (L) 0.70 - 1.20 mg/dL    GFR Non-African American >60.0 >60    GFR  >60.0 >60    Calcium 7.5 (L) 8.5 - 9.9 mg/dL   Magnesium    Collection Time: 04/13/21  4:54 AM   Result Value Ref Range    Magnesium 1.7 1.7 - 2.4 mg/dL   CBC Auto Differential    Collection Time: 04/13/21  4:54 AM   Result Value Ref Range    WBC 13.1 (H) 4.8 - 10.8 K/uL    RBC 4.10 (L) 4.70 - 6.10 M/uL    Hemoglobin 12.3 (L) 14.0 - 18.0 g/dL    Hematocrit 36.5 (L) 42.0 - 52.0 %    MCV 88.9 80.0 - 100.0 fL    MCH 30.0 27.0 - 31.3 pg    MCHC 33.8 33.0 - 37.0 %    RDW 13.7 11.5 - 14.5 %    Platelets 823 107 - 614 K/uL    Neutrophils % 93.5 %    Lymphocytes % 3.0 %    Monocytes % 3.5 %    Eosinophils % 0.0 %    Basophils % 0.0 %    Neutrophils Absolute 12.3 (H) 1.4 - 6.5 K/uL    Lymphocytes Absolute 0.4 (L) 1.0 - 4.8 K/uL    Monocytes Absolute 0.5 0.2 - 0.8 K/uL    Eosinophils Absolute 0.0 0.0 - 0.7 K/uL    Basophils Absolute 0.0 0.0 - 0.2 K/uL       Previous extensive, complex labs, notes and diagnostics reviewed and analyzed. ALLERGIES:    Allergies as of 04/07/2021 - Review Complete 04/07/2021   Allergen Reaction Noted    Vicodin [hydrocodone-acetaminophen] Nausea Only 05/18/2012      (please also verify by checking [facility])     Complex Physical Medicine & Rehab Issues Assess & Plan:   1. Severe abnormality of gait and mobility and impaired self-care and ADL's secondary to bilateral subarachnoid hemorrhage with subdural hematoma and contusions TBI status post old C7 complete quadriplegia.   Functional and medical status reassessed regarding patients ability to participate in therapies and patient found to be able to participate in  acute intensive comprehensive inpatient rehabilitation program including PT/OT to improve balance, ambulation, ADLs, and to improve the P/AROM. It is my opinion that they will be able to tolerate 3 hours of therapy a day and benefit from it at an acute level. 2. Bowel constipation and Bladder dysfunction overactive bladder:  frequent toileting, ambulate to bathroom with assistance, check post void residuals. Check for C.difficile x1 if >2 loose stools in 24 hours, continue bowel & bladder program.  Monitor for UTI symptoms including lethargy and confusion  3. Severe headache and generalized OA pain: reassess pain every shift and prior to and after each therapy session, give prn  Tylenol, modalities prn in therapy, consider Lidoderm, K-pad prn.   4. Skin breakdown risk: Strict side to side turns continue pressure relief program.  Daily skin exams and reports from nursing. Add CorPak feedings progress toward PEG tube  5. Severe fatigue due to immobility and nutritional deficits: He will get a feeding tube placed because of right lower lobe pneumonia probable aspiration and significant swallowing deficits at baseline and much worse since recent TBI add vitamin B12 vitamin D and CoQ10 titrate dosing and add protein supplementation with low carb content. 6. Complex discharge planning:   Await medical stability patient either has a UTI and/or colonization. Falls aspiration pneumonia on dual antibiotic therapy change Dey catheter recheck UA correct potassium. Patient also at is high risk for oral pharyngeal dysphagia as he has oropharyngeal dysphagia at baseline with a new traumatic brain injury he is n.p.o. may need feeding tube because he already appeared fairly malnourished. Discussed at length with his  trauma team.  m primary care team Agree with n.p.o. status. I however disagree with the skilled plan at discharge she is too complicated from a physiatric as well as a medical standpoint to go to a skilled center at this point. He needs to come to acute rehab once he stabilizes.     Complex Active General Medical Issues that complicate care Assess & Plan:     1.  Principal Problem:    SAH (subarachnoid hemorrhage) (HCC)  Active Problems:    Aspiration into respiratory tract    Depression    Severe protein-calorie malnutrition ([address]    C7 spinal cord injury ([facility]    Cognitive change    Neurogenic bowel    Neurogenic bladder    Acute pain due to trauma    Acute metabolic encephalopathy    Hypokalemia    Acute cystitis with hematuria    Aspiration pneumonia (HCC)    Leukocytosis    Dysphagia, oropharyngeal phase    Right lower lobe pneumonia    Hypomagnesemia  Resolved Problems:    Hyponatremia        Hakan Lara D.O., PM&R     Attending    [address]

## 2021-04-13 NOTE — PROGRESS NOTES
[facility]  Facility/Department: [facility]  Speech Language Pathology   Treatment Note    Song Signs Michellet  1960  [id number]    Medical Dx: [facility] (subarachnoid hemorrhage) ([facility] [address] [I60.9]  Speech Dx: Dysphagia and Aphasia     4/13/2021    Subjective:  Alert, Cooperative and Confused    Inconsistent lethargy. Interventions used this date:  Expressive Language, Receptive Language, Cognitive Skill Development and Dysphagia Treatment    Objective/Assessment:  Patient progressing towards goals:  Goal 1: Pt will follow 1 step directions given orally with 75% accuracy with mod cues to increase the pt's ability to follow directions provided by caregivers for safe follow through with ADLs. Pt followed 1-step directions with 50% accuracy with repetition of direction needed (simple directions I.e open eyes, squeeze hand, stick out tongue, etc.) and occasional verbal prompts to maintain alertness. Goal 2: Pt will identify objects/pictures within a field of 2-4 with 70% accuracy with mod cues in order to increase his understanding of objects in his environment for safer and more independent completion of ADLs. Not addressed  Goal 3: Pt will complete confrontational naming tasks with 60% accuracy with mod verbal cues to help the patient express his basic wants and needs. When asked to name a picture or object, patient would close his eyes and state \"yes\" for each attempt. Goal 4: Pt will complete automatic speech tasks with 70% accuracy with mod verbal cues to help the patient express his basic wants and needs. Pt able to state his name and birthday. For subsequent automatic speech tasks, pt continued to perseverate on his name and birthday despite verbal models. Goal 5: To decrease cognitive deficits and improve attention to tasks for safe completion of ADLs, pt will sustain attention to participate in functional activities for up to 5 minutes with mild redirection.   Pt required consistent re-orientation to task throughout the treatment session. Goal 6: Pt will answer simple level yes/no questions with 70% accuracy with mild cues to assist the caregiver in obtaining important information regarding the patient's personal, medical, and safety needs. Pt answered simple yes/no questions with 60% accuracy. 1. Pt will perform oral motor labial/lingual ROM/strengthening exercises 5x/each with mod cues. Pt unable to follow directions to complete despite max verbal and tactile cues. 2. Pt will tolerate therapeutic trials of puree/honey via spoon with no overt s/s of difficulty or aspiration on 100% of trials. SLP offered pt moistened toothette. Pt opened his mouth following verbal prompt, however pt made no attempts to manipulate despite max verbal cues. SLP attempted again with ice chip and 1/4 tsp of puree. Pt began to open and close his mouth, however no attempts for lingual manipulation of bolus was observed. Each bolus feel into sublingual space and was removed by SLP. Additional PO trials not deemed safe at this time. 3. Pt will exhibit swallow response following thermal tactile stim 5/5 trials. SLP attempted to complete thermal stimulation of anterior faucial pillars, however pt immediately closed his mouth upon all SLP attempts. 4. MBS to be performed when deemed appropriate by treating SLP. Pt is not appropriate for an MBS at this time due to absent bolus manipulation and no attempts to produce a pharyngeal swallow at bedside. Discussed with RN [id number]. Treatment/Activity Tolerance:  Patient tolerated treatment well    Plan: Alter current POC (see above)    Pain Assessment:  Initial Assessment:  Patient denies pain. Re-assessment:  Patient denies pain. RN informed. Patient/Caregiver Education:  Patient educated on session and progression towards goals. Education needs reinforcement.     Safety Devices:  Bed alarm in place and Call light within reach      Therapy Time  Time in: Marlen Garces 134  Time out: Judit 53 Minutes: 10  Dysphagia Minutes: 10    Signature: Electronically signed by Eugenio Edmond SLP on 4/13/2021 at 9:22 AM

## 2021-04-13 NOTE — PROGRESS NOTES
Patients wife called wanting patient to transfer to [facility] because of concerns with his care. Trauma physician notified of the request. Physician stated that the request can be looked into in the morning.

## 2021-04-14 ENCOUNTER — APPOINTMENT (OUTPATIENT)
Dept: GENERAL RADIOLOGY | Age: 61
DRG: 085 | End: 2021-04-14
Payer: COMMERCIAL

## 2021-04-14 ENCOUNTER — APPOINTMENT (OUTPATIENT)
Dept: CT IMAGING | Age: 61
DRG: 085 | End: 2021-04-14
Payer: COMMERCIAL

## 2021-04-14 LAB
ANION GAP SERPL CALCULATED.3IONS-SCNC: 6 MEQ/L (ref 9–15)
BASOPHILS ABSOLUTE: 0 K/UL (ref 0–0.2)
BASOPHILS RELATIVE PERCENT: 0 %
BUN BLDV-MCNC: 16 MG/DL (ref 6–20)
CALCIUM SERPL-MCNC: 8.8 MG/DL (ref 8.5–9.9)
CHLORIDE BLD-SCNC: 111 MEQ/L (ref 95–107)
CO2: 28 MEQ/L (ref 20–31)
CREAT SERPL-MCNC: 0.32 MG/DL (ref 0.7–1.2)
EOSINOPHILS ABSOLUTE: 0 K/UL (ref 0–0.7)
EOSINOPHILS RELATIVE PERCENT: 0 %
GFR AFRICAN AMERICAN: >60
GFR NON-AFRICAN AMERICAN: >60
GLUCOSE BLD-MCNC: 112 MG/DL (ref 60–115)
GLUCOSE BLD-MCNC: 138 MG/DL (ref 70–99)
GLUCOSE BLD-MCNC: 156 MG/DL (ref 60–115)
HCT VFR BLD CALC: 33 % (ref 42–52)
HEMOGLOBIN: 11.3 G/DL (ref 14–18)
LYMPHOCYTES ABSOLUTE: 0.3 K/UL (ref 1–4.8)
LYMPHOCYTES RELATIVE PERCENT: 2.8 %
MAGNESIUM: 2 MG/DL (ref 1.7–2.4)
MCH RBC QN AUTO: 30.7 PG (ref 27–31.3)
MCHC RBC AUTO-ENTMCNC: 34.4 % (ref 33–37)
MCV RBC AUTO: 89.3 FL (ref 80–100)
MONOCYTES ABSOLUTE: 0.4 K/UL (ref 0.2–0.8)
MONOCYTES RELATIVE PERCENT: 4.2 %
MRSA CULTURE ONLY: NORMAL
NEUTROPHILS ABSOLUTE: 9.8 K/UL (ref 1.4–6.5)
NEUTROPHILS RELATIVE PERCENT: 93 %
ORGANISM: ABNORMAL
PDW BLD-RTO: 13.6 % (ref 11.5–14.5)
PERFORMED ON: ABNORMAL
PERFORMED ON: NORMAL
PLATELET # BLD: 229 K/UL (ref 130–400)
POTASSIUM REFLEX MAGNESIUM: 3.6 MEQ/L (ref 3.4–4.9)
RBC # BLD: 3.69 M/UL (ref 4.7–6.1)
SARS-COV-2, NAAT: NOT DETECTED
SODIUM BLD-SCNC: 145 MEQ/L (ref 135–144)
URINE CULTURE, ROUTINE: ABNORMAL
VANCOMYCIN TROUGH: 6.9 UG/ML (ref 10–20)
WBC # BLD: 10.5 K/UL (ref 4.8–10.8)

## 2021-04-14 PROCEDURE — 6360000002 HC RX W HCPCS: Performed by: PSYCHIATRY & NEUROLOGY

## 2021-04-14 PROCEDURE — 94640 AIRWAY INHALATION TREATMENT: CPT

## 2021-04-14 PROCEDURE — 2580000003 HC RX 258: Performed by: PHYSICIAN ASSISTANT

## 2021-04-14 PROCEDURE — 71045 X-RAY EXAM CHEST 1 VIEW: CPT

## 2021-04-14 PROCEDURE — 6360000002 HC RX W HCPCS: Performed by: PHYSICIAN ASSISTANT

## 2021-04-14 PROCEDURE — 99291 CRITICAL CARE FIRST HOUR: CPT | Performed by: SURGERY

## 2021-04-14 PROCEDURE — 6360000002 HC RX W HCPCS

## 2021-04-14 PROCEDURE — 83735 ASSAY OF MAGNESIUM: CPT

## 2021-04-14 PROCEDURE — 70450 CT HEAD/BRAIN W/O DYE: CPT

## 2021-04-14 PROCEDURE — 6360000002 HC RX W HCPCS: Performed by: NURSE PRACTITIONER

## 2021-04-14 PROCEDURE — 2580000003 HC RX 258: Performed by: PSYCHIATRY & NEUROLOGY

## 2021-04-14 PROCEDURE — 6370000000 HC RX 637 (ALT 250 FOR IP): Performed by: PHYSICIAN ASSISTANT

## 2021-04-14 PROCEDURE — 85025 COMPLETE CBC W/AUTO DIFF WBC: CPT

## 2021-04-14 PROCEDURE — 6370000000 HC RX 637 (ALT 250 FOR IP): Performed by: NEUROLOGICAL SURGERY

## 2021-04-14 PROCEDURE — 80048 BASIC METABOLIC PNL TOTAL CA: CPT

## 2021-04-14 PROCEDURE — 87635 SARS-COV-2 COVID-19 AMP PRB: CPT

## 2021-04-14 PROCEDURE — 2000000000 HC ICU R&B

## 2021-04-14 PROCEDURE — 94668 MNPJ CHEST WALL SBSQ: CPT

## 2021-04-14 PROCEDURE — 99232 SBSQ HOSP IP/OBS MODERATE 35: CPT | Performed by: PHYSICAL MEDICINE & REHABILITATION

## 2021-04-14 PROCEDURE — 94761 N-INVAS EAR/PLS OXIMETRY MLT: CPT

## 2021-04-14 PROCEDURE — 36415 COLL VENOUS BLD VENIPUNCTURE: CPT

## 2021-04-14 PROCEDURE — 95816 EEG AWAKE AND DROWSY: CPT

## 2021-04-14 PROCEDURE — 99254 IP/OBS CNSLTJ NEW/EST MOD 60: CPT | Performed by: PSYCHIATRY & NEUROLOGY

## 2021-04-14 PROCEDURE — 2580000003 HC RX 258: Performed by: NURSE PRACTITIONER

## 2021-04-14 PROCEDURE — 80202 ASSAY OF VANCOMYCIN: CPT

## 2021-04-14 RX ORDER — PHENYTOIN SODIUM 50 MG/ML
100 INJECTION, SOLUTION INTRAMUSCULAR; INTRAVENOUS EVERY 8 HOURS
Status: DISCONTINUED | OUTPATIENT
Start: 2021-04-14 | End: 2021-04-15 | Stop reason: HOSPADM

## 2021-04-14 RX ORDER — LORAZEPAM 2 MG/ML
2 INJECTION INTRAMUSCULAR ONCE
Status: COMPLETED | OUTPATIENT
Start: 2021-04-14 | End: 2021-04-14

## 2021-04-14 RX ORDER — POTASSIUM CHLORIDE 7.45 MG/ML
10 INJECTION INTRAVENOUS
Status: DISPENSED | OUTPATIENT
Start: 2021-04-14 | End: 2021-04-14

## 2021-04-14 RX ORDER — SODIUM CHLORIDE, SODIUM LACTATE, POTASSIUM CHLORIDE, AND CALCIUM CHLORIDE .6; .31; .03; .02 G/100ML; G/100ML; G/100ML; G/100ML
1000 INJECTION, SOLUTION INTRAVENOUS ONCE
Status: COMPLETED | OUTPATIENT
Start: 2021-04-14 | End: 2021-04-14

## 2021-04-14 RX ORDER — SENNOSIDES 8.8 MG/5ML
5 LIQUID ORAL NIGHTLY
Status: DISCONTINUED | OUTPATIENT
Start: 2021-04-14 | End: 2021-04-15 | Stop reason: HOSPADM

## 2021-04-14 RX ORDER — CLONAZEPAM 1 MG/1
2 TABLET ORAL 2 TIMES DAILY
Status: DISCONTINUED | OUTPATIENT
Start: 2021-04-14 | End: 2021-04-15

## 2021-04-14 RX ORDER — LORAZEPAM 2 MG/ML
INJECTION INTRAMUSCULAR
Status: COMPLETED
Start: 2021-04-14 | End: 2021-04-14

## 2021-04-14 RX ADMIN — CLONAZEPAM 2 MG: 1 TABLET ORAL at 21:18

## 2021-04-14 RX ADMIN — POTASSIUM CHLORIDE AND SODIUM CHLORIDE: 900; 150 INJECTION, SOLUTION INTRAVENOUS at 21:11

## 2021-04-14 RX ADMIN — KETOROLAC TROMETHAMINE 15 MG: 15 INJECTION, SOLUTION INTRAMUSCULAR; INTRAVENOUS at 21:18

## 2021-04-14 RX ADMIN — POTASSIUM CHLORIDE 10 MEQ: 7.46 INJECTION, SOLUTION INTRAVENOUS at 02:33

## 2021-04-14 RX ADMIN — LEVETIRACETAM 500 MG: 100 INJECTION, SOLUTION INTRAVENOUS at 02:14

## 2021-04-14 RX ADMIN — ALBUTEROL SULFATE 2.5 MG: 2.5 SOLUTION RESPIRATORY (INHALATION) at 13:54

## 2021-04-14 RX ADMIN — ALBUTEROL SULFATE 2.5 MG: 2.5 SOLUTION RESPIRATORY (INHALATION) at 01:51

## 2021-04-14 RX ADMIN — SENNOSIDES 8.8 MG: 8.8 LIQUID ORAL at 21:11

## 2021-04-14 RX ADMIN — DOCUSATE SODIUM 100 MG: 50 LIQUID ORAL at 12:07

## 2021-04-14 RX ADMIN — VANCOMYCIN HYDROCHLORIDE 1250 MG: 5 INJECTION, POWDER, LYOPHILIZED, FOR SOLUTION INTRAVENOUS at 05:10

## 2021-04-14 RX ADMIN — CEFEPIME 1000 MG: 1 INJECTION, POWDER, FOR SOLUTION INTRAMUSCULAR; INTRAVENOUS at 12:05

## 2021-04-14 RX ADMIN — ALBUTEROL SULFATE 2.5 MG: 2.5 SOLUTION RESPIRATORY (INHALATION) at 19:24

## 2021-04-14 RX ADMIN — KETOROLAC TROMETHAMINE 15 MG: 15 INJECTION, SOLUTION INTRAMUSCULAR; INTRAVENOUS at 12:07

## 2021-04-14 RX ADMIN — CEFEPIME 1000 MG: 1 INJECTION, POWDER, FOR SOLUTION INTRAMUSCULAR; INTRAVENOUS at 03:26

## 2021-04-14 RX ADMIN — LORAZEPAM: 2 INJECTION INTRAMUSCULAR; INTRAVENOUS at 08:28

## 2021-04-14 RX ADMIN — PHENYTOIN SODIUM 100 MG: 50 INJECTION INTRAMUSCULAR; INTRAVENOUS at 23:19

## 2021-04-14 RX ADMIN — LORAZEPAM 2 MG: 2 INJECTION INTRAMUSCULAR; INTRAVENOUS at 08:27

## 2021-04-14 RX ADMIN — CLONAZEPAM 2 MG: 1 TABLET ORAL at 12:47

## 2021-04-14 RX ADMIN — KETOROLAC TROMETHAMINE 15 MG: 15 INJECTION, SOLUTION INTRAMUSCULAR; INTRAVENOUS at 02:52

## 2021-04-14 RX ADMIN — POTASSIUM CHLORIDE AND SODIUM CHLORIDE: 900; 150 INJECTION, SOLUTION INTRAVENOUS at 02:55

## 2021-04-14 RX ADMIN — SODIUM CHLORIDE, POTASSIUM CHLORIDE, SODIUM LACTATE AND CALCIUM CHLORIDE 1000 ML: 600; 310; 30; 20 INJECTION, SOLUTION INTRAVENOUS at 14:46

## 2021-04-14 RX ADMIN — Medication 10 ML: at 02:18

## 2021-04-14 RX ADMIN — PHENYTOIN SODIUM 100 MG: 50 INJECTION INTRAMUSCULAR; INTRAVENOUS at 17:19

## 2021-04-14 RX ADMIN — METHOCARBAMOL 750 MG: 100 INJECTION INTRAMUSCULAR; INTRAVENOUS at 02:18

## 2021-04-14 RX ADMIN — Medication 10 ML: at 21:11

## 2021-04-14 RX ADMIN — PHENYTOIN SODIUM 1200 MG: 50 INJECTION INTRAMUSCULAR; INTRAVENOUS at 12:59

## 2021-04-14 RX ADMIN — VANCOMYCIN HYDROCHLORIDE 1000 MG: 1 INJECTION, POWDER, LYOPHILIZED, FOR SOLUTION INTRAVENOUS at 21:26

## 2021-04-14 RX ADMIN — DOCUSATE SODIUM 100 MG: 50 LIQUID ORAL at 12:47

## 2021-04-14 RX ADMIN — VANCOMYCIN HYDROCHLORIDE 1000 MG: 1 INJECTION, POWDER, LYOPHILIZED, FOR SOLUTION INTRAVENOUS at 12:06

## 2021-04-14 RX ADMIN — DOCUSATE SODIUM 100 MG: 50 LIQUID ORAL at 21:18

## 2021-04-14 ASSESSMENT — PAIN SCALES - PAIN ASSESSMENT IN ADVANCED DEMENTIA (PAINAD)
FACIALEXPRESSION: 2
FACIALEXPRESSION: 2

## 2021-04-14 NOTE — DISCHARGE SUMMARY
[address]  [address]  Tan Hemant, Dede Walker  MRN: [id number]  YOB: 1960  62 y.o.male      Attending  No att. providers found ? Date of Admission  4/7/2021 Date of Discharge  4/16/2021      ? DIAGNOSES:  Principal Problem:    SAH (subarachnoid hemorrhage) (HCC)  Active Problems:    Aspiration into respiratory tract    Depression    Severe protein-calorie malnutrition (HCC)    C7 spinal cord injury ([facility] [address]    Cognitive change    Neurogenic bowel    Neurogenic bladder    Acute pain due to trauma    Acute metabolic encephalopathy    Hypokalemia    Acute cystitis with hematuria    Aspiration pneumonia (HCC)    Leukocytosis    Dysphagia, oropharyngeal phase    Right lower lobe pneumonia    Hypomagnesemia    Status epilepticus (HCC)    Traumatic hemorrhage of right cerebrum with loss of consciousness of 30 minutes or less ([facility] [address]    Spinal cord injury at T7-T12 level with complete spinal cord lesion [facility])  Resolved Problems:    Hyponatremia      PROCEDURES:  4/15/2021: Anesthesiology with Marco Lomeli MD  - endotrachial intubation (due to respiratory failure manifested by increasing serum CO2 and mental status. Failed trial of bipap). ?    DISCHARGE MEDICATIONS:   Alexandra Forte   Home Medication Instructions SYA:[id number]    Printed on:04/16/21 1432   Medication Information                      albuterol (PROVENTIL) (2.5 MG/3ML) 0.083% nebulizer solution  USE 1 VIAL IN NEBULIZER TWICE DAILY             alendronate (FOSAMAX) 70 MG tablet  Take 70 mg by mouth every 7 days             Alendronate Sodium (FOSAMAX PO)  Take by mouth             Calcium Carbonate-Vitamin D (CALCIUM + D PO)  Take 600 mg by mouth daily. cetirizine-psuedoephedrine (ZYRTEC-D) 5-120 MG per extended release tablet  Take 1 tablet by mouth 2 times daily             clonazePAM (KLONOPIN) 2 MG tablet  Take 1 tablet by mouth 2 times daily as needed for Anxiety. ferrous sulfate 325 (65 FE) MG tablet  Take 325 mg by mouth daily. ibuprofen (ADVIL;MOTRIN) 600 MG tablet  Take 600 mg by mouth as needed. Multiple Vitamins-Minerals (CENTRUM SILVER PO)  Take  by mouth daily. Omega-3 Fatty Acids (FISH OIL) 500 MG CAPS  Take 500 mg by mouth daily. Risedronate Sodium (ACTONEL) 150 MG TABS  Use  One tablet monthly. No current facility-administered medications for this encounter. Current Outpatient Medications:     cetirizine-psuedoephedrine (ZYRTEC-D) 5-120 MG per extended release tablet, Take 1 tablet by mouth 2 times daily, Disp: , Rfl:     albuterol (PROVENTIL) (2.5 MG/3ML) 0.083% nebulizer solution, USE 1 VIAL IN NEBULIZER TWICE DAILY, Disp: , Rfl:     alendronate (FOSAMAX) 70 MG tablet, Take 70 mg by mouth every 7 days, Disp: , Rfl:     clonazePAM (KLONOPIN) 2 MG tablet, Take 1 tablet by mouth 2 times daily as needed for Anxiety. , Disp: 60 tablet, Rfl: 1    Alendronate Sodium (FOSAMAX PO), Take by mouth, Disp: , Rfl:     Risedronate Sodium (ACTONEL) 150 MG TABS, Use  One tablet monthly. (Patient not taking: Reported on 9/25/2020), Disp: 1 tablet, Rfl: 6    ferrous sulfate 325 (65 FE) MG tablet, Take 325 mg by mouth daily. , Disp: , Rfl:     Calcium Carbonate-Vitamin D (CALCIUM + D PO), Take 600 mg by mouth daily. , Disp: , Rfl:     Omega-3 Fatty Acids (FISH OIL) 500 MG CAPS, Take 500 mg by mouth daily. , Disp: , Rfl:     ibuprofen (ADVIL;MOTRIN) 600 MG tablet, Take 600 mg by mouth as needed. , Disp: , Rfl:     Multiple Vitamins-Minerals (CENTRUM SILVER PO), Take  by mouth daily. , Disp: , Rfl:        REASON FOR HOSPITALIZATION:  Armond Luna is a 62 y. o. male with a PMHx of paraplegia presented as a transfer from [facility] on 4/7/21 after he was found to have bifrontal subarachnoid hemorrhage. Per reports patient was found down at his home by a [employer] .  It was suspected that he feel backwards out of his wheelchair. Upon arrival to [facility] [place] [facility], pt noted to be behavioral, impulsive, and altered from baseline mentation. A&Ox1 (self), pulling at line, and physically/verbally combative with staff requiring Ativan and soft restraints. Per wife, his baseline mentation is A&Ox4 and he holds a job with [employer]. He was admitted to the Trauma Service with Neurosurgery (Dr. Yesica Waite) consulted. SIGNIFICANT FINDINGS:  Catalog of Injuries:   1. S/p found down at home on 4/7/21  2. Bifrontal subarachnoid hemorrhage (NSGY, non-op)  3. Acute metabolic encephalopathy  4. Left parietal subarachnoid hemorrhage (NSGY, non-op)  5. Hypokalemia   6. Neurogenic bladder  7. Neurogenic bowel  8. Occipital skull fracture extending to the foramen magnum (CTA negative)  9. Leukocytosis  10. Aspiration PNA  11. Status Epilepticus  12. Respiratory failure  13. Respiratory acidosis (intubated 4/15/21 by anesthesiology)    Incidental Findings: None (Reviewed by JLR, 4/8/21)     HOSPITAL COURSE:  4/7/2021: Found to have bifrontal SAH at [facility] and transferred to [address] [place] [address] s/p found down at home by [employer] . AMS and hyponatremia noted upon arrival. NSGY consulted. 4/8/2021: Precedex gtt started for impulsivity and safety. Remains A&Ox0, GCS 2/3/5. [address] with interval development of bifrontal SAH and new area of SAH at left parietal region. 4/9/2021: Repeat CT head completed-stable. Precedex gtt weaned off but had to be resumed in the evening due to agitation/restlessness.   4/10/2021: Improving mentation. Precedex infusion discontinued. Transferred to the [facility]  4/11/2021: Worsening mentation on AM examination. Repeat CT head completed with slight increase in L frontal SDH/hygroma but otherwise stable. Required placement of O2 overnight due to drop in O2 saturations to 97%. CXR ordered by NSGY with concern for RLL consolidation/PNA  4/12/2021: New leukocytosis and O2 requirements overnight with desaturation. Continued poor neuro exam. Abx initiated for PNA. 4/13/2021: Corpak paced, leukocytosis improving, continued RLL PNA with worsening L lung consolidation  4/14/2021: Moved to ICU in AM for concerns of seizures/status epilepticus. No additional seizures after x1 dose ativan, starting dilantin, and increasing keppra. Neurology consulted. [address] stable. EEG negative for further seizures. GCS 9 (E3, V1, M5)  4/15/2021: Patient with respiratory failure due to CO2 retention. Failed trial of Bipap and required intubation. ABG improved s/p intubation. Mental status improved today. GCS 13 (E3, V4, M6). Started on propofol and levo. Patient's wife requesting transfer to [facility] on 4/14. [facility] attending discussed patient with [facility] trauma attending. [facility] will accept patient to trauma service. Wife agreeable with transfer. Patient transferred to [facility] via [facility] critical care on 4/15. The patient was seen and examined on the day of discharge with the following findings:  Constitutional: Lying in bed, HOB elevated. Drowsy/on propofol but arousal to voice. HEENT: Atraumatic normocephalic. Corpak in place. L pupil >R pupil, reactive to light (consistent with previous pupil exams)  Cardiovascular: Regular rate and rhythm. Palpable radial and DP pulses bilaterally. Pulmonary: Intubated with ETT. On AC/VC mechanical ventilation. Rhonchi noted throughout bilaterally upper> lower. Diminished in bilateral bases. No wheezing or rales.   Abdominal: Soft. Non-distended. Non-tender. : Indwelling toscano catheter in place (chronic) , draining clear, yellow urine.   Musculoskeletal: PROM intact BUE, not following commands to move BUE, no motor in BLE at baseline secondary to prior SCI.  No peripheral edema appreciated. Neurological: Open eyes with verbal stimuli, Patient nodding/shaking head appropriately to yes/no questions, grasps hands to command. GCS 13 (E3, V4, M6).  Weakness in BUE compared to previous exams with minimal symmetric  strength. Paraplegia with absence of voluntary motor in BLE. Of note, hands contracted/spastic at baseline. Skin: Warm and dry      ANTICIPATED FOLLOW UP:  Follow up with Neurosurgery (Dr. Philippe Johnson) in TBD  Follow up with Neurology TBD  No Trauma follow up needed    VTE RISK AT DISCHARGE:  At time of discharge, chemo ppx held in setting of recent TBI and altered mental status. Will need discussion about when chemo ppx can be started at new facility. --  Jonh Falcon PA-C  Trauma/Critical Care  Emergency General Surgery  [phone] ([phone])  [phone]      20 minutes were spent on the discharge of this patient including final examination of the patient, discussion of the hospital stay, instructions for continuing care to all relevant caregivers, preparation of discharge records, prescriptions and referral forms, and clear identification of reasons to return to clinic or to emergency room.

## 2021-04-14 NOTE — PROGRESS NOTES
TRAUMA DAILY PROGRESS NOTE      Patient Name: Ronit Kellogg  Admission Date 4/7/2021    Hospital Day: 7  Patient seen and examined on 4/14/2021    INTERVAL HISTORY/EVENTS     Background:  Luis M Luna is a 62 y. o. male with a PMHx of paraplegia presented as a transfer from [facility] on 4/7/21 after he was found to have bifrontal subarachnoid hemorrhage. Per reports patient was found down at his home by a [employer] . It was suspected that he feel backwards out of his wheelchair. Upon arrival to [place] ED, pt noted to be behavioral, impulsive, and altered from baseline mentation. A&Ox1 (self), pulling at line, and physically/verbally combative with staff requiring Ativan and soft restraints. Per wife, his baseline mentation is A&Ox4 and he holds a job with [employer]. He was admitted to the Trauma Service with Neurosurgery (Dr. Raúl Smith) consulted.  [facility] Course:  4/7/2021: Found to have bifrontal SAH at [facility] and transferred to [facility] s/p found down at home by [employer] . AMS and hyponatremia noted upon arrival. NSGY consulted. 4/8/2021: Precedex gtt started for impulsivity and safety. Remains A&Ox0, GCS 2/3/5. [address] with interval development of bifrontal SAH and new area of SAH at left parietal region. 4/9/2021: Repeat CT head completed-stable. Precedex gtt weaned off but had to be resumed in the evening due to agitation/restlessness.   4/10/2021: Improving mentation. Precedex infusion discontinued. Transferred to the [facility]  4/11/2021: Worsening mentation on AM examination. Repeat CT head completed with slight increase in L frontal SDH/hygroma but otherwise stable. Required placement of O2 overnight due to drop in O2 saturations to 97%. CXR ordered by NSGY with concern for RLL consolidation/PNA  4/12/2021: New leukocytosis and O2 requirements overnight with desaturation. Continued poor neuro exam. Abx initiated for PNA.   4/13/2021: Corpak paced, leukocytosis improving, continued RLL PNA with worsening L lung consolidation    24 Hour Events:  Nursing staff called Trauma APPs at [place] [address] regarding seizure like activity. Per report, patient had 9 seizures overnight. Patient does not have a history of seizures. Seizures were described as upward gaze, posturing of upper extremities and tremors. Trauma attending contacted overnight, evaluated patient and felt that physical exam matched that of Monday's exam and did not require intervention at the time. Neurosurgery contacted by nursing staff for new orders in AM. Trauma team consulted neurology for EEG and recs. During encounter, patient actively seized twice. IV ativan given. Decision was made to move patient to the ICU. Repeat head CT stable. Patient's wife requesting transfer to [facility]. Trauma team arranged accepting physician by trauma team at [facility]. Labs reviewed. Leukocytosis resolved 10.5 (13.1,15, 7.8). Slight downtrend in H/H at 11.3/33 (12.3/36.5). Hypomagnesemia resolved at 2.0 (1.7). Potassium stable at 3.6 (3.7). BUN/Cr stable at 16/0. 32. Labs otherwise within normal range. Intake: 2834 mL  Output: 2350 mL   BM x0 occurrences. No documented BM since admit on 2021      PHYSICAL EXAM     Vitals Average, Min and Max for last 24 hours:  Temp: Temp: 99 °F (37.2 °C);  Temp  Av.4 °F (36.9 °C)  Min: 97.7 °F (36.5 °C)  Max: 99 °F (37.2 °C)  Respirations: Resp  Av.8  Min: 18  Max: 35  Pulse: Pulse  Av.3  Min: 92  Max: 103  Blood Pressure: Systolic (09SDI), BHV:726 , Min:130 , DXK:517   ; Diastolic (52ACY), LUT:03, Min:76, Max:102    SpO2: SpO2  Av.4 %  Min: 93 %  Max: 98 %    24hr Intake/Output:     Intake/Output Summary (Last 24 hours) at 2021 0717  Last data filed at 2021 0354  Gross per 24 hour   Intake 2834 ml   Output 2350 ml   Net 484 ml       Vitals: /77   Pulse 103   Temp 99 °F (37.2 °C) (Axillary)   Resp 20   Ht 6' 1\" (1.854 m)   Wt 137 lb 9.1 oz (62.4 kg)   SpO2 95%   BMI 18.15 kg/m²     Physical Exam:    Constitutional: Lying in bed, HOB elevated. Eyes open but not resopnsive to verbal stimuli. Diaphoretic. HEENT: Atraumatic normocephalic. Corpak in place. L pupil >R pupil, reactive to light (consistent with previous pupil exams)  Cardiovascular: Regular rate and rhythm. Palpable radial and DP pulses bilaterally. Pulmonary: Rhonchi noted throughout bilaterally upper> lower. Diminished in bilateral bases. No wheezing or rales. Increased work of breathing. O2 saturations >92% on nasal cannula  Abdominal: Soft. Non-distended. Non-tender. : Indwelling toscano catheter in place (chronic) , draining clear, yellow urine.   Musculoskeletal: Moves BUE spontaneously but not to command, no motor in BLE at baseline secondary to prior SCI.  No peripheral edema appreciated. Neurological: Seizure like activity x2 for ~2 minutes, involuntary facial twitching, upward gaze to right (confirmed by trauma attending to be similar to previous neuro exam on 4/12), involuntary posturing of BUE. Not following commands or answering questions. Retracted to pain at nail bed in LLE, but not RLE. GCS 9 (E4,V1,M4). Of note, hands contracted/spastic at baseline.   Skin: Warm and diaphoretic.     LABORATORY RESULTS (LAST 24 HOURS)     CBC with Differential:    Lab Results   Component Value Date    WBC 10.5 04/14/2021    RBC 3.69 04/14/2021    HGB 11.3 04/14/2021    HCT 33.0 04/14/2021     04/14/2021    MCV 89.3 04/14/2021    MCH 30.7 04/14/2021    MCHC 34.4 04/14/2021    RDW 13.6 04/14/2021    BANDSPCT 20 04/12/2021    METASPCT 3 04/12/2021    LYMPHOPCT 2.8 04/14/2021    MONOPCT 4.2 04/14/2021    BASOPCT 0.0 04/14/2021    MONOSABS 0.4 04/14/2021    LYMPHSABS 0.3 04/14/2021    EOSABS 0.0 04/14/2021    BASOSABS 0.0 04/14/2021     CMP:    Lab Results   Component Value Date     04/14/2021    K 3.6 04/14/2021     04/14/2021    CO2 28 04/14/2021    BUN 16 04/14/2021    CREATININE 0.32 04/14/2021 foramen magnum. CTA negative for BCVI. Stable repeat CT head 4/14. Seizure like activity.  - NSGY consulted, appreciate recs  - Keppra increased this AM to 500 mg q8hr. In setting of seizure like activity will continue per neurosurgery recs  - CT head obtained today and stable   - Neurology consulted for seizure like activity  - Started Dilantin 100 mg Q8hr IV  - EEG pending read   - IV ativan given for zeuizure like activity  - Restart home Klonopin 4 mg HS  - Continue seizure precautions  - SLP consulted for cognitive evaluation and swallow evaluation: repeat eval on 4/13 consistent with previous eval. Continue NPO due to aspiration risk. Not appropriate for MBS at this time. - Continue PO Tylenol 650mg q 4hrs prn mild/moderate pain  - Avoiding opioid medications in the setting of worsening mental status on 4/12     Cardiovascular:   No acute cardiovascular issues. BP's have been acceptable with no tachycardia appreciated.   - Continue IV Hydralazine and Labetalol PRN SBP greater than 160mmHg (not required in last 24 hrs)     Respiratory:   Maintained appropriate saturations of 92-98% on 3L NC overnight. Not on oxygen at home. CXR completed 4/11 with RLL consolidation and interval progression of left lung infiltrate and concern for aspiration PNA. - Maintain O2 sat >92%  - Continue pulmonary toilet  - Vest therapy Q6h scheduled with albuterol treatments  - Repeat CXR in AM      GI/Diet:  Bedside swallow completed 4/10 and repeat eval on 4/13 by SLP with diet recommendations. Concern for aspiration. No bowel movement recorded since admission.  - Maintain NPO in setting of worsening mental status and concern for aspiration. Maintain corpak. - Continue tube feeds at goal of 50cc/hr.   - Repeat SLP swallow evaluation once mentation improved  - Continue bowel regimen (colace BID, senna QD, dulcolax suppository QOD with digital stimulation, glycerin suppository QOD)     Renal/Electrolytes:   Hyponatremia resolved, hypokalemic stable this AM but still at 3.6. Mag at 2.0. Chronic indwelling toscano catheter for neurogenic bladder in the setting of prior spinal cord injury.    - Continue NS w/ KCl at 75cc/hr in setting of NPO  - Replete electrolytes PRN-give 20mq KCl IV  - Repeat BMP/Mag in AM  - Maintain indwelling toscano catheter for history of neurogenic bladder     ID:   Leukocytosis resolved. Remains afebrile. CXR with RLL and left lobe consolidation concerning for PNA. UA and urine cultures positive for Gm (-) bacteria and nitrites but patient does have history of chronic indwelling catheter.  - Continue Vanco and cefepime.  - Pharmacy consult for vanco dosing  - Daily CBC for now      Heme:   No acute hematologic issues. H and H remains acceptable. - No indication for transfusion.  - Repeat CBC in AM to trend H and H.      Endocrine:   No acute glycemic/endocrine issues.     MSK:   PMHx of paraplegia.   - Activity: OOB to chair TID  - Spines: Clear  - Weight bearing restrictions: None  - PT/OT: Attempted evaluations on prior days but unable to complete secondary to mental status. Appreciate PT/OT for passive ROM/exercises     Tubes/Lines/Drains:  -Maintain PIVs  -Maintain toscano catheter.  Patient has chronic, indwelling toscano catheter that he presented to hospital with, for history of neurogenic bladder in the setting of spinal cord injury.      Prophylaxis:   - SCDs only, Continue holding chemo PPx in setting of mental status change/seizure like activity-will discuss timing of DVT chemoprophylaxis with NSGY  - No GI PPx indicated     Dispo: Transfer patient to ICU due to seizure like activity. Will plan for patient transfer to [facility] per wife request. [facility] trauma attending to [facility] trauma attending sign out completed. April Cormier accepts patient. Currently pending transport    Patient's wife, Mary Lou Paredes updated on patient's condition and ongoing plan of care.   All questions were answered.      Follow up with Neurosurgery (Dr. Moris Sorensen) in TBD  No Trauma follow up needed     Please call for any questions or concerns.     Addendum: Critical care was necessary because of an illness or injury that acutely impaired one or more vital organs systems such that there was a high probability of imminent or life threatening deterioration in the patients condition. The following organ systems are involved: Neuro, respiratory    Critical care time was provided for >30 minutes by the Trauma DARRICK. The time involved in the performance of this care was exclusive of separately billable procedures, teaching time and treating other patients. This time was spent personally by the trauma DARRICK for the following activities: examination of patient, ordering and/or performing treatments, ordering and reviewing laboratory and radiographic studies, and if applicable, ventilatory management and blood gas interpretation. Ricardo Morales PA-C  Trauma/Critical Care  Emergency General Surgery  [phone] (2Z-2K)  [phone]       This patient's plan of care was discussed and made in collaboration with Trauma Attending physician, Claudia Moses MD.    Addendum 14:16   Nursing staff called for BP 60/30 with MAP in 40s. Ordered 500cc bolus. Patient evaluateded in ICU by Trauma DARRICK. Patient lying in bed in NAD. Respiratory rate improved from 30s to low 20s. Repeat BP improved to 109/60 MAP 80. Patient noted to be mouth breathing on 3L NC and noted to be 88% o2sat. Patient placed on non rebreather mask. O2sat improved to 98% at 5L. No other new orders at this time. Ricardo Morales PA-C  Trauma/Critical Care  Emergency General Surgery  [phone] (0E-7H)  [phone]    Teaching Physician Note:  I have personally performed a face to face diagnostic  evaluation on this patient. I have reviewed and agree with the care plan. In addition to the above. .. we had multiple discussions with his wife who asked for us to consider transfer to [facility] given that it is closer and it is where he has had care before. I spoke with [facility] ([facility] physicians to help facilitate transfer)    They agreed with transfer and initially spoke with Dr. Andrea Polanco on trauma who accepted; We moved him to the unit promptly in the morning for concerns for airway protection and possible seizures earlier in am around 3am  (3 seconds) and clear seizures in am around 730. No further seizures were identified after changing meds and ativan. Still concerned about long term prognosis; and feel that he will need a PEG and if he doesn't improve will potentially need intubation. I spent 45 minutes of ICU care on this patient today.     Asher Fox

## 2021-04-14 NOTE — PROGRESS NOTES
Physical Therapy Missed Treatment   Facility/Department: [facility] MED SURG [id number]    NAME: Nakul Soares    : 1960 (62 y.o.)  MRN: [id number]    Account: [de-identified]  Gender: male    Chart reviewed, attempted PT at 10:15A. Patient unavailable 2° to:    [x] Hold per nsg request. Nsg states pt has had multiple seizures this AM and will be transferring to ICU as soon as a bed becomes available. [] Pt declined     [] Nsg notified   [] Other notified    [] Pt. . off floor for test/procedure. [] Pt. Unavailable       Will attempt PT treatment again at earliest convenience.       Electronically signed by Maddie Leger PTA on 21 at 10:39 AM EDT

## 2021-04-14 NOTE — PROGRESS NOTES
Pharmacy Vancomycin Consult     Vancomycin Day: 3  Current Dosing: vancomycin 1250mg IV q12hr    Temp max:  99F    Recent Labs     04/12/21  1804 04/13/21  0454 04/14/21  0453   BUN 9 11  --    CREATININE 0.32* 0.29*  --    WBC  --  13.1* 10.5       Intake/Output Summary (Last 24 hours) at 4/14/2021 0616  Last data filed at 4/14/2021 0354  Gross per 24 hour   Intake 2834 ml   Output 2350 ml   Net 484 ml     Culture Date      Source                       Results  N/A    Ht Readings from Last 1 Encounters:   04/10/21 6' 1\" (1.854 m)        Wt Readings from Last 1 Encounters:   04/10/21 137 lb 9.1 oz (62.4 kg)       Body mass index is 18.15 kg/m². Estimated Creatinine Clearance: 242 mL/min (A) (based on SCr of 0.29 mg/dL (L)). Trough: 6.9    Assessment/Plan:  Trough subtherapeutic for treatment of aspiration pneumonia. Change to 1000mg IV q8hr. Trough prior to 4th new dose Pharmacokinetic calculations estimate trough around 12.3, AUC/PARMINDER 501. 3. Thank you,    Samy Finley, ROMÁN. Ph.  4/14/2021  6:22 AM

## 2021-04-14 NOTE — PROGRESS NOTES
2107: Sister of pt called and was given and update. She tolerated it well. 2130: Brother of the pt called and was given an update. He states he wants his brother transferred to [facility]. He was informed that patient's are not discharged from the hospital at night. He tolerated update well. 0138: Seizure activity noted. Lasted less than 5 seconds. Pt states trouble breathing; RT was called. SpO2 was 95% on 3L of O2 and RR 35. Shift assessment complete, see flowsheet. Will continue to monitor. 0215: Seizure activity noted. Lasted about 5 seconds. Keppra given. 0330: Seizure activity noted. Lasted about 3 seconds. 0345: Doctor Pancho Rhodes was called and notified of seizures and patient's respiratory status. No new orders at this time. 9920: Kiana called and reported pt had a seizure starting at 0556 and ending at 46. His airway is clear and he is in no distress. Will continue to monitor. 2580: Kiana reported another seizure lasting 45 seconds. Pt airway is clear. Dayshift nurse present.

## 2021-04-14 NOTE — PROGRESS NOTES
[id number] [facility] Occupational Therapy Department  Change in Status Communication Form      To the referring physician:    Due to an acute change in this patient's medical status, new Occupational Therapy Eval and Treatment orders are required. Pt. has transferred to ICU. Please reorder when pt. is medically stable to resume OOB activity, as appropriate. We thank you for your referral.     Talya [facility] OT Department.      Electronically signed by DOMINICK Harris on 4/14/21 at 3:06 PM EDT

## 2021-04-14 NOTE — PROGRESS NOTES
feeding-corpac feeds    Bowel/Bladder:  incontinent,  Dey-Klebsiella UTI   General:  Patient is well developed, adequately nourished, non-obese and     well kempt. HEENT:    PERRLA, hearing intact to loud voice, external inspection of ear     and nose benign. Inspection of lips, tongue and gums benign  Musculoskeletal: No significant change in strength or tone. All joints stable. Inspection and palpation of digits and nails show no clubbing,       cyanosis or inflammatory conditions. Neuro/Psychiatric: Affect: flat-   sedated. No significant change in deep tendon reflexes or     sensation  Lungs:  Diminished, CTA-B. Respiration effort is normal at rest.  Right lower lobe pneumonia  Heart:   S1 = S2,   RRR. No loud murmurs. Abdomen:  Soft, non-tender, no enlargement of liver or spleen. Extremities:  No significant lower extremity edema or tenderness. Skin:    BUE bruises dt blood draws, no visualized or palpated problems. Rehabilitation:  Physical therapy: FIMS:  Bed Mobility: Scootin Person assistance, Maximal assistance(cranially in bed)    Transfers:  ,  ,      FIMS:  ,  , Assessment: Continued PT indicated to progress mobility and facilitate DC at highest level of indep and safety. Occupational therapy: FIMS:   ,  , Assessment: Pt is a 63 y/o previously independent at w/c level who presents to [facility] with the above deficits which impact his independence during ADLs and IADLs. Pt would benefit from OT services to maximize independence and safety during ADLs and IADLs.     Speech therapy: FIMS:        Lab/X-ray studies reviewed, analyzed and discussed with patient and staff:   Recent Results (from the past 24 hour(s))   POCT Glucose    Collection Time: 21  5:23 PM   Result Value Ref Range    POC Glucose 135 (H) 60 - 115 mg/dl    Performed on ACCU-CHEK    POCT Glucose    Collection Time: 21  7:33 PM   Result Value Ref Range    POC Glucose 137 (H) 60 - 115 mg/dl    Performed on [place], TROUGH    Collection Time: 04/14/21  1:29 AM   Result Value Ref Range    Vancomycin Tr 6.9 (L) 10.0 - 20.0 ug/mL   POCT Glucose    Collection Time: 04/14/21  3:59 AM   Result Value Ref Range    POC Glucose 156 (H) 60 - 115 mg/dl    Performed on ACCU-CHEK    Basic Metabolic Panel w/ Reflex to MG    Collection Time: 04/14/21  4:53 AM   Result Value Ref Range    Sodium 145 (H) 135 - 144 mEq/L    Potassium reflex Magnesium 3.6 3.4 - 4.9 mEq/L    Chloride 111 (H) 95 - 107 mEq/L    CO2 28 20 - 31 mEq/L    Anion Gap 6 (L) 9 - 15 mEq/L    Glucose 138 (H) 70 - 99 mg/dL    BUN 16 6 - 20 mg/dL    CREATININE 0.32 (L) 0.70 - 1.20 mg/dL    GFR Non-African American >60.0 >60    GFR  >60.0 >60    Calcium 8.8 8.5 - 9.9 mg/dL   Magnesium    Collection Time: 04/14/21  4:53 AM   Result Value Ref Range    Magnesium 2.0 1.7 - 2.4 mg/dL   CBC Auto Differential    Collection Time: 04/14/21  4:53 AM   Result Value Ref Range    WBC 10.5 4.8 - 10.8 K/uL    RBC 3.69 (L) 4.70 - 6.10 M/uL    Hemoglobin 11.3 (L) 14.0 - 18.0 g/dL    Hematocrit 33.0 (L) 42.0 - 52.0 %    MCV 89.3 80.0 - 100.0 fL    MCH 30.7 27.0 - 31.3 pg    MCHC 34.4 33.0 - 37.0 %    RDW 13.6 11.5 - 14.5 %    Platelets 296 257 - 859 K/uL    Neutrophils % 93.0 %    Lymphocytes % 2.8 %    Monocytes % 4.2 %    Eosinophils % 0.0 %    Basophils % 0.0 %    Neutrophils Absolute 9.8 (H) 1.4 - 6.5 K/uL    Lymphocytes Absolute 0.3 (L) 1.0 - 4.8 K/uL    Monocytes Absolute 0.4 0.2 - 0.8 K/uL    Eosinophils Absolute 0.0 0.0 - 0.7 K/uL    Basophils Absolute 0.0 0.0 - 0.2 K/uL       Previous extensive, complex labs, notes and diagnostics reviewed and analyzed. ALLERGIES:    Allergies as of 04/07/2021 - Review Complete 04/07/2021   Allergen Reaction Noted    Vicodin [hydrocodone-acetaminophen] Nausea Only 05/18/2012      (please also verify by checking [facility])     Complex Physical Medicine & Rehab Issues Assess & Plan:   1.  Severe abnormality of gait and mobility and impaired self-care and ADL's secondary to bilateral subarachnoid hemorrhage with subdural hematoma and contusions TBI status post old C7 complete quadriplegia. Functional and medical status reassessed regarding patients ability to participate in therapies and patient found to be able to participate in  acute intensive comprehensive inpatient rehabilitation program including PT/OT to improve balance, ambulation, ADLs, and to improve the P/AROM. It is my opinion that they will be able to tolerate 3 hours of therapy a day and benefit from it at an acute level. 2. Bowel constipation and Bladder dysfunction overactive bladder:  frequent toileting, ambulate to bathroom with assistance, check post void residuals. Check for C.difficile x1 if >2 loose stools in 24 hours, continue bowel & bladder program.  Monitor for UTI symptoms including lethargy and confusion  3. Severe headache and generalized OA pain: reassess pain every shift and prior to and after each therapy session, give prn  Tylenol, modalities prn in therapy, consider Lidoderm, K-pad prn.   4. Skin breakdown risk: Strict side to side turns continue pressure relief program.  Daily skin exams and reports from nursing. Add CorPak feedings progress toward PEG tube  5. Severe fatigue due to immobility and nutritional deficits: He will get a feeding tube placed because of right lower lobe pneumonia probable aspiration and significant swallowing deficits at baseline and much worse since recent TBI add vitamin B12 vitamin D and CoQ10 titrate dosing and add protein supplementation with low carb content. 6. Complex discharge planning:   Await medical stability  -being moved to the ICU because of seizure disorder. Discussed with neurology. Complex Active General Medical Issues that complicate care Assess & Plan:     1.  Principal Problem:    SAH (subarachnoid hemorrhage) (HCC)  Active Problems:    Aspiration into respiratory tract Depression    Severe protein-calorie malnutrition ([facility] [address]    C7 spinal cord injury ([facility] [address]    Cognitive change    Neurogenic bowel    Neurogenic bladder    Acute pain due to trauma    Acute metabolic encephalopathy    Hypokalemia    Acute cystitis with hematuria    Aspiration pneumonia (HCC)    Leukocytosis    Dysphagia, oropharyngeal phase    Right lower lobe pneumonia    Hypomagnesemia  Resolved Problems:    Hyponatremia        Hakan Lara D.O., PM&R     Attending    [address]
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
## 2021-04-14 NOTE — FLOWSHEET NOTE
Called to room during the change of shift due to pt having a seizure. Once in room seizure stopped. Stayed in room. 745 am pt went into another seizure posturing and resp up to 49 eyes rolling to back of head and mouth opening wide with eyes closed very tightly. This continued fo 2 minutes then he finally relaxed vitalafter it was over was 100/66 HR 88 temp 98.7   resp 41% on 3l. .  758 pt hd another seizure lasting 1 minute, then relaxed. 805 pt had another seizure this one lasting 3 minutes and trauma team in the room . Pt starts with the rapid resp then both eyes roll back and mouth opens, eyes closed tightly,and put looks like he is crying ,then starts jerking and posturing. After 3 minutes you can see his muscles relax and pt calms down vital were 146/98 mapping at 109 HR 98  98% on 3l/NC resp 40.  816 pt had same type of seizure lasting 2 min  826 pt started another seizure last 1 1/2 min but was given 2 mg of Ativan by RN IV. B/p 121/85 HR 95 resp 40  And 92% on 3l/NC. Pt resting quietly at this sofia vital at 845 am are 135/87 mapping at 101 HR 93 Resp 32 and 92% on 3l N/C. Yamel Martel Pt off the floor for CT scan RN supervisor went with him vitals remain stable 142/86 HR 88 resp 30. Electronically signed by Bibiana Noyola LPN on 7/98/7205 at 7:15 AM   Pt returned from CT scan. Electronically signed by Bibiana Noyola LPN on 6/07/5343 at 6:93 AM

## 2021-04-14 NOTE — PROGRESS NOTES
- Nursing staff notified Trauma DARRICK at 4:20pm with concern for agonal breathing. Reports that Dr. Buzz Fontana was also in with patient at this time and also expressed concern. Vitals otherwise reported to be stable at this time with SBP of 98, HR in 80s, and SPO2 >92% on NRB 5L. - Trauma DARRICK evaluated patient at 4:45pm. Patient not noted to have agonal breathing at this time. Work of breathing consistent with previous exams over last two days. SBP in high 90s, HR in 80s, SPO2 >92% on NC 5L, RR low 20s (improved from 30s overnight and early this AM). Conversation held with nursing staff who agrees that breathing is now consistent with breathing status when presenting to ICU earlier in the day. No longer with agonal breathing. No change in care plan at this time. Trauma Attending called to communicate nursing's concern for earlier period of agonal breathing at 5pm shift change.       Anya Bahena Heady  [address] [facility]  Emergency General Surgery  [phone] ([phone]) [phone]

## 2021-04-14 NOTE — PROGRESS NOTES
[facility]   Facility/Department: [facility]  Speech Language Pathology    Soledad Luna  1960  [id number]    Date: 4/14/2021      Speech Therapy attempted to see Peri Argueta on this date for a/an:    Treatment    Pt was unable to be seen due to: Other: Dr. Kristine Kaye consult note states pt has had multiple seizures this AM and will be transferring to ICU as soon as a bed becomes available. Pt lethargic and no appropriate for tx at this time.          Electronically signed by MEGA Roblero on 4/14/21 at 10:46 AM EDT

## 2021-04-15 ENCOUNTER — ANESTHESIA EVENT (OUTPATIENT)
Dept: ICU | Age: 61
DRG: 085 | End: 2021-04-15
Payer: COMMERCIAL

## 2021-04-15 ENCOUNTER — ANESTHESIA (OUTPATIENT)
Dept: ICU | Age: 61
DRG: 085 | End: 2021-04-15
Payer: COMMERCIAL

## 2021-04-15 ENCOUNTER — APPOINTMENT (OUTPATIENT)
Dept: GENERAL RADIOLOGY | Age: 61
DRG: 085 | End: 2021-04-15
Payer: COMMERCIAL

## 2021-04-15 VITALS
SYSTOLIC BLOOD PRESSURE: 94 MMHG | TEMPERATURE: 98.4 F | HEIGHT: 73 IN | DIASTOLIC BLOOD PRESSURE: 52 MMHG | HEART RATE: 89 BPM | BODY MASS INDEX: 18.23 KG/M2 | OXYGEN SATURATION: 95 % | WEIGHT: 137.57 LBS | RESPIRATION RATE: 21 BRPM

## 2021-04-15 LAB
ANION GAP SERPL CALCULATED.3IONS-SCNC: 10 MEQ/L (ref 9–15)
BASE EXCESS ARTERIAL: 6 (ref -3–3)
BASE EXCESS ARTERIAL: 6 (ref -3–3)
BASE EXCESS ARTERIAL: 7 (ref -3–3)
BASOPHILS ABSOLUTE: 0 K/UL (ref 0–0.2)
BASOPHILS RELATIVE PERCENT: 0.2 %
BUN BLDV-MCNC: 19 MG/DL (ref 6–20)
CALCIUM IONIZED: 1.41 MMOL/L (ref 1.12–1.32)
CALCIUM IONIZED: 1.42 MMOL/L (ref 1.12–1.32)
CALCIUM IONIZED: 1.43 MMOL/L (ref 1.12–1.32)
CALCIUM IONIZED: 1.43 MMOL/L (ref 1.12–1.32)
CALCIUM IONIZED: 1.45 MMOL/L (ref 1.12–1.32)
CALCIUM SERPL-MCNC: 8.5 MG/DL (ref 8.5–9.9)
CHLORIDE BLD-SCNC: 114 MEQ/L (ref 95–107)
CO2: 26 MEQ/L (ref 20–31)
CREAT SERPL-MCNC: 0.44 MG/DL (ref 0.7–1.2)
EOSINOPHILS ABSOLUTE: 0 K/UL (ref 0–0.7)
EOSINOPHILS RELATIVE PERCENT: 0.4 %
GFR AFRICAN AMERICAN: >60
GFR NON-AFRICAN AMERICAN: >60
GLUCOSE BLD-MCNC: 135 MG/DL (ref 70–99)
GLUCOSE BLD-MCNC: 146 MG/DL (ref 60–115)
GLUCOSE BLD-MCNC: 151 MG/DL (ref 60–115)
GLUCOSE BLD-MCNC: 154 MG/DL (ref 60–115)
GLUCOSE BLD-MCNC: 164 MG/DL (ref 60–115)
GLUCOSE BLD-MCNC: 166 MG/DL (ref 60–115)
HCO3 ARTERIAL: 32 MMOL/L (ref 21–29)
HCO3 ARTERIAL: 33.1 MMOL/L (ref 21–29)
HCO3 ARTERIAL: 33.3 MMOL/L (ref 21–29)
HCO3 ARTERIAL: 33.6 MMOL/L (ref 21–29)
HCO3 ARTERIAL: 33.9 MMOL/L (ref 21–29)
HCT VFR BLD CALC: 34.1 % (ref 42–52)
HEMOGLOBIN: 10 GM/DL (ref 13.5–17.5)
HEMOGLOBIN: 10.2 GM/DL (ref 13.5–17.5)
HEMOGLOBIN: 10.3 GM/DL (ref 13.5–17.5)
HEMOGLOBIN: 10.4 GM/DL (ref 13.5–17.5)
HEMOGLOBIN: 11 GM/DL (ref 13.5–17.5)
HEMOGLOBIN: 11.5 G/DL (ref 14–18)
LACTATE: 0.4 MMOL/L (ref 0.4–2)
LACTATE: 0.66 MMOL/L (ref 0.4–2)
LACTATE: 0.83 MMOL/L (ref 0.4–2)
LACTATE: 1.03 MMOL/L (ref 0.4–2)
LACTATE: 1.12 MMOL/L (ref 0.4–2)
LYMPHOCYTES ABSOLUTE: 0.5 K/UL (ref 1–4.8)
LYMPHOCYTES RELATIVE PERCENT: 5.4 %
MAGNESIUM: 2 MG/DL (ref 1.7–2.4)
MCH RBC QN AUTO: 30.3 PG (ref 27–31.3)
MCHC RBC AUTO-ENTMCNC: 33.8 % (ref 33–37)
MCV RBC AUTO: 89.7 FL (ref 80–100)
MONOCYTES ABSOLUTE: 0.6 K/UL (ref 0.2–0.8)
MONOCYTES RELATIVE PERCENT: 6.2 %
NEUTROPHILS ABSOLUTE: 8 K/UL (ref 1.4–6.5)
NEUTROPHILS RELATIVE PERCENT: 87.8 %
O2 SAT, ARTERIAL: 93 % (ref 93–100)
O2 SAT, ARTERIAL: 95 % (ref 93–100)
O2 SAT, ARTERIAL: 95 % (ref 93–100)
O2 SAT, ARTERIAL: 97 % (ref 93–100)
O2 SAT, ARTERIAL: 99 % (ref 93–100)
PCO2 ARTERIAL: 54 MM HG (ref 35–45)
PCO2 ARTERIAL: 74 MM HG (ref 35–45)
PCO2 ARTERIAL: 75 MM HG (ref 35–45)
PDW BLD-RTO: 13.8 % (ref 11.5–14.5)
PERFORMED ON: ABNORMAL
PH ARTERIAL: 7.25 (ref 7.35–7.45)
PH ARTERIAL: 7.26 (ref 7.35–7.45)
PH ARTERIAL: 7.27 (ref 7.35–7.45)
PH ARTERIAL: 7.27 (ref 7.35–7.45)
PH ARTERIAL: 7.38 (ref 7.35–7.45)
PHENYTOIN LEVEL: 13.9 UG/ML (ref 10–20)
PLATELET # BLD: 206 K/UL (ref 130–400)
PO2 ARTERIAL: 105 MM HG (ref 75–108)
PO2 ARTERIAL: 175 MM HG (ref 75–108)
PO2 ARTERIAL: 77 MM HG (ref 75–108)
PO2 ARTERIAL: 81 MM HG (ref 75–108)
PO2 ARTERIAL: 87 MM HG (ref 75–108)
POC CHLORIDE: 110 MEQ/L (ref 99–110)
POC CHLORIDE: 112 MEQ/L (ref 99–110)
POC CHLORIDE: 112 MEQ/L (ref 99–110)
POC CHLORIDE: 113 MEQ/L (ref 99–110)
POC CHLORIDE: 113 MEQ/L (ref 99–110)
POC CREATININE: 0.6 MG/DL (ref 0.9–1.3)
POC CREATININE: 0.7 MG/DL (ref 0.9–1.3)
POC FIO2: 40
POC FIO2: 50
POC HEMATOCRIT: 29 % (ref 41–53)
POC HEMATOCRIT: 30 % (ref 41–53)
POC HEMATOCRIT: 30 % (ref 41–53)
POC HEMATOCRIT: 31 % (ref 41–53)
POC HEMATOCRIT: 32 % (ref 41–53)
POC POTASSIUM: 3.6 MEQ/L (ref 3.5–5.1)
POC POTASSIUM: 3.6 MEQ/L (ref 3.5–5.1)
POC POTASSIUM: 3.7 MEQ/L (ref 3.5–5.1)
POC SAMPLE TYPE: ABNORMAL
POC SODIUM: 149 MEQ/L (ref 136–145)
POC SODIUM: 149 MEQ/L (ref 136–145)
POC SODIUM: 150 MEQ/L (ref 136–145)
POC SODIUM: 150 MEQ/L (ref 136–145)
POC SODIUM: 151 MEQ/L (ref 136–145)
POTASSIUM REFLEX MAGNESIUM: 4 MEQ/L (ref 3.4–4.9)
RBC # BLD: 3.8 M/UL (ref 4.7–6.1)
SODIUM BLD-SCNC: 150 MEQ/L (ref 135–144)
TCO2 ARTERIAL: 34 (ref 22–29)
TCO2 ARTERIAL: 35 (ref 22–29)
TCO2 ARTERIAL: 36 (ref 22–29)
VANCOMYCIN TROUGH: 19.5 UG/ML (ref 10–20)
WBC # BLD: 9.1 K/UL (ref 4.8–10.8)

## 2021-04-15 PROCEDURE — 6360000002 HC RX W HCPCS: Performed by: NURSE PRACTITIONER

## 2021-04-15 PROCEDURE — 82803 BLOOD GASES ANY COMBINATION: CPT

## 2021-04-15 PROCEDURE — 2500000003 HC RX 250 WO HCPCS

## 2021-04-15 PROCEDURE — 0BH17EZ INSERTION OF ENDOTRACHEAL AIRWAY INTO TRACHEA, VIA NATURAL OR ARTIFICIAL OPENING: ICD-10-PCS | Performed by: SURGERY

## 2021-04-15 PROCEDURE — 82565 ASSAY OF CREATININE: CPT

## 2021-04-15 PROCEDURE — 6360000002 HC RX W HCPCS: Performed by: PSYCHIATRY & NEUROLOGY

## 2021-04-15 PROCEDURE — 83735 ASSAY OF MAGNESIUM: CPT

## 2021-04-15 PROCEDURE — 94660 CPAP INITIATION&MGMT: CPT

## 2021-04-15 PROCEDURE — 99292 CRITICAL CARE ADDL 30 MIN: CPT | Performed by: SURGERY

## 2021-04-15 PROCEDURE — 36600 WITHDRAWAL OF ARTERIAL BLOOD: CPT

## 2021-04-15 PROCEDURE — 2580000003 HC RX 258: Performed by: PHYSICIAN ASSISTANT

## 2021-04-15 PROCEDURE — 6360000002 HC RX W HCPCS: Performed by: PHYSICIAN ASSISTANT

## 2021-04-15 PROCEDURE — 82435 ASSAY OF BLOOD CHLORIDE: CPT

## 2021-04-15 PROCEDURE — 83605 ASSAY OF LACTIC ACID: CPT

## 2021-04-15 PROCEDURE — 94760 N-INVAS EAR/PLS OXIMETRY 1: CPT

## 2021-04-15 PROCEDURE — 6370000000 HC RX 637 (ALT 250 FOR IP): Performed by: PHYSICIAN ASSISTANT

## 2021-04-15 PROCEDURE — 80202 ASSAY OF VANCOMYCIN: CPT

## 2021-04-15 PROCEDURE — 80185 ASSAY OF PHENYTOIN TOTAL: CPT

## 2021-04-15 PROCEDURE — 84295 ASSAY OF SERUM SODIUM: CPT

## 2021-04-15 PROCEDURE — 94002 VENT MGMT INPAT INIT DAY: CPT

## 2021-04-15 PROCEDURE — 99232 SBSQ HOSP IP/OBS MODERATE 35: CPT | Performed by: PHYSICAL MEDICINE & REHABILITATION

## 2021-04-15 PROCEDURE — 6360000002 HC RX W HCPCS

## 2021-04-15 PROCEDURE — 31500 INSERT EMERGENCY AIRWAY: CPT

## 2021-04-15 PROCEDURE — 31500 INSERT EMERGENCY AIRWAY: CPT | Performed by: ANESTHESIOLOGY

## 2021-04-15 PROCEDURE — 99291 CRITICAL CARE FIRST HOUR: CPT | Performed by: SURGERY

## 2021-04-15 PROCEDURE — 36415 COLL VENOUS BLD VENIPUNCTURE: CPT

## 2021-04-15 PROCEDURE — 71045 X-RAY EXAM CHEST 1 VIEW: CPT

## 2021-04-15 PROCEDURE — 84132 ASSAY OF SERUM POTASSIUM: CPT

## 2021-04-15 PROCEDURE — 82330 ASSAY OF CALCIUM: CPT

## 2021-04-15 PROCEDURE — 99233 SBSQ HOSP IP/OBS HIGH 50: CPT | Performed by: PSYCHIATRY & NEUROLOGY

## 2021-04-15 PROCEDURE — 6360000002 HC RX W HCPCS: Performed by: NEUROLOGICAL SURGERY

## 2021-04-15 PROCEDURE — 2580000003 HC RX 258: Performed by: NEUROLOGICAL SURGERY

## 2021-04-15 PROCEDURE — 94640 AIRWAY INHALATION TREATMENT: CPT

## 2021-04-15 PROCEDURE — 94761 N-INVAS EAR/PLS OXIMETRY MLT: CPT

## 2021-04-15 PROCEDURE — 85014 HEMATOCRIT: CPT

## 2021-04-15 PROCEDURE — 85025 COMPLETE CBC W/AUTO DIFF WBC: CPT

## 2021-04-15 PROCEDURE — 80048 BASIC METABOLIC PNL TOTAL CA: CPT

## 2021-04-15 PROCEDURE — 2580000003 HC RX 258: Performed by: NURSE PRACTITIONER

## 2021-04-15 PROCEDURE — 2700000000 HC OXYGEN THERAPY PER DAY

## 2021-04-15 PROCEDURE — 5A1935Z RESPIRATORY VENTILATION, LESS THAN 24 CONSECUTIVE HOURS: ICD-10-PCS | Performed by: SURGERY

## 2021-04-15 RX ORDER — PROPOFOL 10 MG/ML
INJECTION, EMULSION INTRAVENOUS
Status: COMPLETED
Start: 2021-04-15 | End: 2021-04-15

## 2021-04-15 RX ORDER — SODIUM CHLORIDE, SODIUM LACTATE, POTASSIUM CHLORIDE, AND CALCIUM CHLORIDE .6; .31; .03; .02 G/100ML; G/100ML; G/100ML; G/100ML
500 INJECTION, SOLUTION INTRAVENOUS ONCE
Status: COMPLETED | OUTPATIENT
Start: 2021-04-15 | End: 2021-04-15

## 2021-04-15 RX ORDER — SUCCINYLCHOLINE/SOD CL,ISO/PF 100 MG/5ML
SYRINGE (ML) INTRAVENOUS
Status: DISCONTINUED
Start: 2021-04-15 | End: 2021-04-15 | Stop reason: HOSPADM

## 2021-04-15 RX ORDER — PROPOFOL 10 MG/ML
5-50 INJECTION, EMULSION INTRAVENOUS
Status: DISCONTINUED | OUTPATIENT
Start: 2021-04-15 | End: 2021-04-15 | Stop reason: HOSPADM

## 2021-04-15 RX ORDER — CLONAZEPAM 1 MG/1
4 TABLET ORAL NIGHTLY
Status: DISCONTINUED | OUTPATIENT
Start: 2021-04-15 | End: 2021-04-15 | Stop reason: HOSPADM

## 2021-04-15 RX ADMIN — DOCUSATE SODIUM 100 MG: 50 LIQUID ORAL at 09:15

## 2021-04-15 RX ADMIN — PHENYTOIN SODIUM 100 MG: 50 INJECTION INTRAMUSCULAR; INTRAVENOUS at 07:09

## 2021-04-15 RX ADMIN — SODIUM CHLORIDE, POTASSIUM CHLORIDE, SODIUM LACTATE AND CALCIUM CHLORIDE 500 ML: 600; 310; 30; 20 INJECTION, SOLUTION INTRAVENOUS at 12:20

## 2021-04-15 RX ADMIN — CEFEPIME 1000 MG: 1 INJECTION, POWDER, FOR SOLUTION INTRAMUSCULAR; INTRAVENOUS at 11:14

## 2021-04-15 RX ADMIN — ALBUTEROL SULFATE 2.5 MG: 2.5 SOLUTION RESPIRATORY (INHALATION) at 03:22

## 2021-04-15 RX ADMIN — LEVETIRACETAM 500 MG: 100 INJECTION, SOLUTION, CONCENTRATE INTRAVENOUS at 02:07

## 2021-04-15 RX ADMIN — PROPOFOL 10 MCG/KG/MIN: 10 INJECTION, EMULSION INTRAVENOUS at 16:15

## 2021-04-15 RX ADMIN — CEFEPIME 1000 MG: 1 INJECTION, POWDER, FOR SOLUTION INTRAMUSCULAR; INTRAVENOUS at 00:06

## 2021-04-15 RX ADMIN — KETOROLAC TROMETHAMINE 15 MG: 15 INJECTION, SOLUTION INTRAMUSCULAR; INTRAVENOUS at 11:14

## 2021-04-15 RX ADMIN — METHOCARBAMOL 750 MG: 100 INJECTION INTRAMUSCULAR; INTRAVENOUS at 00:03

## 2021-04-15 RX ADMIN — METHOCARBAMOL 750 MG: 100 INJECTION INTRAMUSCULAR; INTRAVENOUS at 08:21

## 2021-04-15 RX ADMIN — VANCOMYCIN HYDROCHLORIDE 1000 MG: 1 INJECTION, POWDER, LYOPHILIZED, FOR SOLUTION INTRAVENOUS at 04:31

## 2021-04-15 RX ADMIN — KETOROLAC TROMETHAMINE 15 MG: 15 INJECTION, SOLUTION INTRAMUSCULAR; INTRAVENOUS at 17:40

## 2021-04-15 RX ADMIN — PHENYTOIN SODIUM 100 MG: 50 INJECTION INTRAMUSCULAR; INTRAVENOUS at 16:45

## 2021-04-15 RX ADMIN — Medication 5 MCG/MIN: at 16:09

## 2021-04-15 RX ADMIN — KETOROLAC TROMETHAMINE 15 MG: 15 INJECTION, SOLUTION INTRAMUSCULAR; INTRAVENOUS at 03:09

## 2021-04-15 RX ADMIN — Medication 10 ML: at 08:21

## 2021-04-15 RX ADMIN — ALBUTEROL SULFATE 2.5 MG: 2.5 SOLUTION RESPIRATORY (INHALATION) at 10:31

## 2021-04-15 RX ADMIN — LEVETIRACETAM 500 MG: 100 INJECTION, SOLUTION, CONCENTRATE INTRAVENOUS at 10:02

## 2021-04-15 RX ADMIN — ALBUTEROL SULFATE 2.5 MG: 2.5 SOLUTION RESPIRATORY (INHALATION) at 15:50

## 2021-04-15 ASSESSMENT — PAIN SCALES - PAIN ASSESSMENT IN ADVANCED DEMENTIA (PAINAD)
FACIALEXPRESSION: 2

## 2021-04-15 ASSESSMENT — PAIN SCALES - GENERAL: PAINLEVEL_OUTOF10: 5

## 2021-04-15 NOTE — PROGRESS NOTES
Patient: Aravind Chandra  Unit/Bed: [facility]/IC01-01  YOB: 1960  MRN: [id number] Acct: [de-identified]   Admitting Diagnosis: SAH (subarachnoid hemorrhage) ([facility] [I60.9]  Admit Date:  4/7/2021  Hospital Day: 8    Current Medications:    Scheduled Meds:   clonazePAM  4 mg Oral Nightly    vancomycin  15 mg/kg Intravenous Q8H    docusate  100 mg Per NG tube BID    senna  5 mL Per NG tube Nightly    levetiracetam  500 mg Intravenous Q8H    phenytoin  100 mg Intravenous Q8H    ketorolac  15 mg Intravenous Q6H    methocarbamol  750 mg Intravenous Q8H    albuterol  2.5 mg Nebulization Q6H    cefepime  1,000 mg Intravenous Q12H    vancomycin (VANCOCIN) intermittent dosing (placeholder)   Other RX Placeholder    bisacodyl  10 mg Rectal Every Other Day    glycerin (ADULT)  1 suppository Rectal Every Other Day    sodium chloride flush  10 mL Intravenous 2 times per day     Continuous Infusions:   0.9% NaCl with KCl 20 mEq 75 mL/hr at 04/14/21 2111    sodium chloride       PRN Meds:.hydrALAZINE, labetalol, bisacodyl, sodium chloride flush, sodium chloride, promethazine **OR** ondansetron, acetaminophen  .  0.9% NaCl with KCl 20 mEq 75 mL/hr at 04/14/21 2111    sodium chloride          Recent Labs     04/13/21  0454 04/14/21  0453 04/15/21  0506   WBC 13.1* 10.5 9.1   HGB 12.3* 11.3* 11.5*   HCT 36.5* 33.0* 34.1*   MCV 88.9 89.3 89.7    229 206     Recent Labs     04/13/21  0454 04/14/21  0453 04/15/21  0506    145* 150*   K 3.7 3.6 4.0    111* 114*   CO2 23 28 26   BUN 11 16 19   CREATININE 0.29* 0.32* 0.44*     No results for input(s): AST, ALT, ALB, BILIDIR, BILITOT, ALKPHOS in the last 72 hours. No results for input(s): LIPASE, AMYLASE in the last 72 hours. No results for input(s): PROT, INR in the last 72 hours.     Imaging Results:    Xr Elbow Right (min 3 Views)    Result Date: 4/7/2021  COMPARISON: August 30, 2016 HISTORY:    fall PATIENT NAME: GUERRERO BEE KASEYT: TECHNIQUE: XR ELBOW RIGHT (MIN 3 VIEWS) FINDINGS: The joint spaces are maintained. Swelling is seen over the dorsum and medial aspect of the elbow. A small calcification is again seen on the dorsal aspect of the elbow near the olecranon. No definite effusion is visualized. No acute fracture    Ct Head Wo Contrast    Result Date: 4/14/2021  CT Brain. Contrast medium:  without contrast.. History:  Seizure. Traumatic brain injury. Technical factors: CT imaging of the brain was obtained and formatted as 5 mm contiguous axial images. 2.5 mm contiguous axial images were obtained through the osseous structures. Sagittal and coronal reconstruction obtained during postprocessing. Comparison:  CT brain, April 7, 8, 9, 11, 2021. Findings: Study limited secondary to motion artifact. Areas of high attenuation within the bilateral frontal lobes, consistent with contusion are again identified and unchanged. The largest, found on the right, measuring 2 cm is stable, and has a low-attenuation rim surrounding it representing edematous change, also stable. Increased attenuation is identified within subarachnoid spaces bilaterally in the frontal lobes, at the level of the vertex, unchanged, as well as posteriorly within the left parietal lobe. No midline shift. No mass affect. No new areas of abnormal attenuation. Ventricles normal in size. Basal cistern and cerebellum without anomaly. Mastoid air cells well pneumatized. Nondisplaced right occipital bone fracture, again identified, unchanged. Impression: Bilateral frontal cerebral contusions, stable. Bilateral subarachnoid hemorrhage as discussed, unchanged. Nondisplaced right occipital fracture, unchanged. All CT scans at this facility use dose modulation, iterative reconstruction, and/or weight based dosing when appropriate to reduce radiation dose to as low as reasonably achievable.     Ct Head Wo Contrast    Result Date: 4/12/2021  EXAMINATION: CT CERVICAL SPINE WO CONTRAST   DATE AND TIME:4/7/2021 6:44 PM CLINICAL HISTORY:Acute posterior neck pain  trauma  COMPARISON: None TECHNIQUE:Helical scanning was performed from the skull base through the remainder of the cervical spine without intravenous contrast. Sagittal and coronal reformats were obtained. The lack of contrast limits CT sensitivity of the soft tissues. All CT scans at this facility use dose modulation, iterative reconstruction, and/or weight based dosing when appropriate to reduce radiation dose to as low as reasonably achievable. Jose Anand FINDINGS  Severe motion artifact limits exam quality inaccuracy Fracture:Extreme motion artifact motion artifact. No obvious fracture. Consider plain film radiography the C-spine if clinical concerns persist Dense atlas:Dens atlas relationship is unremarkable. Soft tissues:Airway patent. No soft tisssue mass or adenopathy. Other findings: Normal age-related bony changes. Extreme motion artifact. No definite fracture. EXAMINATION: CT CERVICAL SPINE WO CONTRAST  DATE AND TIME:4/7/2021 6:44 PM CLINICAL HISTORY: Severe headache.  trauma  COMPARISON: None TECHNIQUE:Axial CT from skull base to vertex without  contrast..  All CT scans at this facility use dose modulation, iterative reconstruction, and/or weight based dosing when appropriate to reduce radiation dose to as low as reasonably achievable. FINDINGS. Bilateral frontal areas of hemorrhagic contusion with bifrontal frontal subarachnoid and subdural hemorrhage. Small subdural hemorrhages extend along the inner calvarium bilaterally extending approximately 4 mm deep to the inner calvarium. There is no midline shift. Small amount of subdural blood along the anterior falx. Small focus of hemorrhagic contusion along the left anterior temporal lobe. No intraventricular blood. No significant parenchymal edema demonstrated at this time. No other areas of acute hemorrhage. 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 . Globes intact.  No acute laterally and along the left side of the anterior falx appear unchanged There is no midline shift, hydrocephalus, or other significant changes identified. The mastoid air cells and visualized paranasal sinuses are essentially clear. STABLE ACUTE INTRACRANIAL HEMORRHAGES FROM YESTERDAY. NO EVIDENCE OF INTERVAL COMPLICATION IDENTIFIED. Ct Head Wo Contrast    Result Date: 4/8/2021  CT HEAD WO CONTRAST CLINICAL HISTORY: Bifrontal subdural hematoma with subarachnoid hemorrhage., Follow-up. Comparison: April 7, 2021 1838 hours TECHNIQUE: Multiple unenhanced serial axial images of the brain from the vertex of the skull to the base of the skull were performed. FINDINGS: The ventricles are dilated. Unchanged size configuration. Mo.  No mass. No midline shift. The cisterns are patent. There is developing area of hemorrhagic parenchymal contusion involving the left posterior inferior aspect of the parietal lobe. There is small subarachnoid hemorrhage noted. Again note is made of bifrontal subdural hematomas with extension of the subdural hematoma into the interhemispheric fissure. The left subdural hematoma extends overlying the left frontal lobe to the level of the posterior aspect of the left frontal lobe. There is associated bifrontal hemorrhagic contusions. The multiple parenchymal contusions are best appreciated on the coronal reconstructions series 601 image 34. There is been some interval enlargement of the right as compared to the prior examination. There is now some increasing surrounding vasogenic edema There are now some scattered areas increased attenuation seen within the base of the left frontal lobe series 2 image 15 and small foci within the superior aspect of the left frontal lobe, series 2 image 2523. Either represent areas of hemorrhage. The visualized osseous structures are unremarkable. The visualized portion of the paranasal sinuses, and mastoids are unremarkable. IMPRESSION 1.  INTERVAL DEVELOPMENT OF ACUTE HEMORRHAGIC PARENCHYMAL CONTUSIONS WITH SMALL SUBARACHNOID COMPONENTS IN THE LEFT POSTERIOR INFERIOR PARIETAL REGION. 2. THERE IS SMALL AREAS OF NEW INCREASED ATTENUATION DESCRIBED ABOVE WITHIN THE LEFT FRONTAL LOBE. FINDINGS REPRESENT SMALL AREAS OF PARENCHYMAL CONTUSION, HEMORRHAGE. 3. BIFRONTAL SUBDURAL HEMATOMAS WITH ASSOCIATED PARENCHYMAL HEMORRHAGIC CONTUSIONS AS DESCRIBED ABOVE. RECOMMEND MRI TO FURTHER EVALUATE All CT scans at this facility use dose modulation, iterative reconstruction, and/or weight based dosing when appropriate to reduce radiation dose to as low as reasonably achievable. Ct Head Wo Contrast    Result Date: 4/7/2021  COMPARISON: September 18, 2020. HISTORY:  fell off a wheel chair slight hematoma  occiput TECHNIQUE: The study was performed without contrast FINDINGS: In the bifrontal regions there is hyperdensity seen in the sulci and falx. The ventricular system is midline with no evidence for hydrocephalus. There is soft tissue prominence also seen The visualized paranasal sinuses and orbits appear unremarkable. No lytic or blastic changes seen in the calvarium. Bifrontal subarachnoid hemorrhage is seen. This was discussed with Dr. Soumya Barber on today's date at 1:35 All CT scans at this facility use dose modulation, iterative reconstruction, and/or weight based dosing when appropriate to reduce radiation dose to as low as reasonably achievable. Cta Neck W Wo Contrast    Result Date: 4/12/2021  EXAMINATION: CTA neck CLINICAL HISTORY: Subarachnoid hemorrhage FINDINGS: Study was performed after the patient received 300 mL of Isovue-300. 2-D and 3-D reconstructions of the extracranial carotid vasculature obtained from the arch to the skull base. Right carotid vasculature: Right common carotid artery, bifurcation, right internal and external carotid arteries widely patent free of significant stenosis.  Left carotid vasculature: Left common carotid artery, bifurcation, left internal and external carotid arteries widely patent from the arch to the skull base. Vertebral arteries: Left vertebral artery dominant vessel. No focal stenosis in the vertebral vasculature. Nondisplaced fracture through the posterior right occipital bone just off the midline noted. Severe degenerative changes overlie the mid cervical spine most prominent at the C4-C5 level subjacent to the fused C5-C7 vertebral bodies. UNREMARKABLE CTA OF THE NECK. NASCET CRITERIA EMPLOYED. Ct Cervical Spine Wo Contrast    Result Date: 4/12/2021  EXAMINATION: CT CERVICAL SPINE WO CONTRAST   DATE AND TIME:4/7/2021 6:44 PM CLINICAL HISTORY:Acute posterior neck pain  trauma  COMPARISON: None TECHNIQUE:Helical scanning was performed from the skull base through the remainder of the cervical spine without intravenous contrast. Sagittal and coronal reformats were obtained. The lack of contrast limits CT sensitivity of the soft tissues. All CT scans at this facility use dose modulation, iterative reconstruction, and/or weight based dosing when appropriate to reduce radiation dose to as low as reasonably achievable. Kizzy Maid FINDINGS  Severe motion artifact limits exam quality inaccuracy Fracture:Extreme motion artifact motion artifact. No obvious fracture. Consider plain film radiography the C-spine if clinical concerns persist Dense atlas:Dens atlas relationship is unremarkable. Soft tissues:Airway patent. No soft tisssue mass or adenopathy. Other findings: Normal age-related bony changes. Extreme motion artifact. No definite fracture.    EXAMINATION: CT CERVICAL SPINE WO CONTRAST  DATE AND TIME:4/7/2021 6:44 PM CLINICAL HISTORY: Severe headache.  trauma  COMPARISON: None TECHNIQUE:Axial CT from skull base to vertex without  contrast..  All CT scans at this facility use dose modulation, iterative reconstruction, and/or weight based dosing when appropriate to reduce radiation dose to as low as reasonably achievable. FINDINGS. Bilateral frontal areas of hemorrhagic contusion with bifrontal frontal subarachnoid and subdural hemorrhage. Small subdural hemorrhages extend along the inner calvarium bilaterally extending approximately 4 mm deep to the inner calvarium. There is no midline shift. Small amount of subdural blood along the anterior falx. Small focus of hemorrhagic contusion along the left anterior temporal lobe. No intraventricular blood. No significant parenchymal edema demonstrated at this time. No other areas of acute hemorrhage. 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 . Globes intact. No acute fracture. Paranasal sinuses and mastoids are clear. These findings were discussed with the ER doctor IMPRESSION: BIFRONTAL HEMORRHAGIC CONTUSIONS WITH SMALL BILATERAL SUBDURAL HEMATOMAS. NO ACUTE FRACTURE     Xr Chest Portable    Result Date: 4/14/2021  EXAMINATION: XR CHEST PORTABLE CLINICAL HISTORY: PNEUMONIA COMPARISONS: CHEST RADIOGRAPH, APRIL 13, 2021 FINDINGS: Feeding tube identified with catheter in proximal gastric body. Osseous structures intact. Cardiopericardial silhouette normal. Bilateral blunting costophrenic angles, with ill-defined areas increase opacity bilateral mid, and lower lungs, greater on right, unchanged. No change, bilateral pneumonia, with bilateral pleural effusion     Xr Chest Portable    Result Date: 4/13/2021  XR CHEST PORTABLE Clinical History:  Subarachnoid hemorrhage. Corpak evaluation. Comparison:  4/13/2021 at 09 19  RESULT: Interval further distal advancement of the enteric tube, now projecting over the left upper quadrant. Patchy opacities within the lung bases, right greater than left, unchanged. Small right pleural effusion, unchanged. No large left pleural effusion. No pneumothorax. Stable cardiomediastinal silhouette. No acute osseous findings. Degenerative changes left shoulder.      Interval further distal placement of the enteric tube, now projecting over the

## 2021-04-15 NOTE — PROGRESS NOTES
Subjective: The patient complains of severe acute on chronic progressive fatigue and confusion partially relieved by rest, PT, OT and meds and exacerbated by exertion and recent illness. I am concerned about patients medical complexities- RECENT VIRGIE Jasso is now in the ICU. On a non rebreather. Reviewed recent nursing note and discussed current status and planned care with acute care providers, \" CHEST VEST HELD AT THIS TIME. Nadyne Cesia \"    ROS x10: The patient also complains of severely impaired mobility and activities of daily living. Otherwise no new problems with vision, hearing, nose, mouth, throat, dermal, cardiovascular, GI, , pulmonary, musculoskeletal, psychiatric or neurological. See Rehab consult on Rehab chart . Vital signs:  /83   Pulse 80   Temp 98.7 °F (37.1 °C) (Tympanic)   Resp 18   Ht 6' 1\" (1.854 m)   Wt 137 lb 9.1 oz (62.4 kg)   SpO2 99%   BMI 18.15 kg/m²   I/O:   PO/Intake:  NPO--dt [id number] placed for feeding-corpac feeds    Bowel/Bladder:  incontinent,  Dey-Klebsiella UTI   General:  Patient is well developed, adequately nourished, non-obese and     well kempt. HEENT:    PERRLA, hearing intact to loud voice, external inspection of ear     and nose benign. Inspection of lips, tongue and gums benign  Musculoskeletal: No significant change in strength or tone. All joints stable. Inspection and palpation of digits and nails show no clubbing,       cyanosis or inflammatory conditions. Neuro/Psychiatric: Affect: flat-awake--oriented to self and place. No significant change in deep tendon reflexes or     sensation  Lungs:  Diminished, CTA-B. Respiration effort is normal at rest.  Right lower lobe pneumonia  Heart:   S1 = S2,   RRR. No loud murmurs. Abdomen:  Soft, non-tender, no enlargement of liver or spleen. Extremities:  No significant lower extremity edema or tenderness.   Skin:    BUE bruises dt blood draws, no visualized or 11.5 - 14.5 %    Platelets 544 320 - 559 K/uL    Neutrophils % 87.8 %    Lymphocytes % 5.4 %    Monocytes % 6.2 %    Eosinophils % 0.4 %    Basophils % 0.2 %    Neutrophils Absolute 8.0 (H) 1.4 - 6.5 K/uL    Lymphocytes Absolute 0.5 (L) 1.0 - 4.8 K/uL    Monocytes Absolute 0.6 0.2 - 0.8 K/uL    Eosinophils Absolute 0.0 0.0 - 0.7 K/uL    Basophils Absolute 0.0 0.0 - 0.2 K/uL       Previous extensive, complex labs, notes and diagnostics reviewed and analyzed. ALLERGIES:    Allergies as of 04/07/2021 - Review Complete 04/07/2021   Allergen Reaction Noted    Vicodin [hydrocodone-acetaminophen] Nausea Only 05/18/2012      (please also verify by checking [facility])     Complex Physical Medicine & Rehab Issues Assess & Plan:   1. Severe abnormality of gait and mobility and impaired self-care and ADL's secondary to bilateral subarachnoid hemorrhage with subdural hematoma and contusions TBI status post old C7 complete quadriplegia. Functional and medical status reassessed regarding patients ability to participate in therapies and patient found to be able to participate in  acute intensive comprehensive inpatient rehabilitation program including PT/OT to improve balance, ambulation, ADLs, and to improve the P/AROM. It is my opinion that they will be able to tolerate 3 hours of therapy a day and benefit from it at an acute level. 2. Bowel constipation and Bladder dysfunction overactive bladder:  frequent toileting, ambulate to bathroom with assistance, check post void residuals. Check for C.difficile x1 if >2 loose stools in 24 hours, continue bowel & bladder program.  Monitor for UTI symptoms including lethargy and confusion  3.  Severe headache and generalized OA pain: reassess pain every shift and prior to and after each therapy session, give prn  Tylenol, modalities prn in therapy, consider Lidoderm, K-pad prn.   4. Skin breakdown risk: Strict side to side turns continue pressure relief program.  Daily skin exams and reports from nursing. Add CorPak feedings progress toward PEG tube  5. Severe fatigue due to immobility and nutritional deficits: He will get a feeding tube placed because of right lower lobe pneumonia probable aspiration and significant swallowing deficits at baseline and much worse since recent TBI add vitamin B12 vitamin D and CoQ10 titrate dosing and add protein supplementation with low carb content. 6. Complex discharge planning:   Await medical stability  -  ICU because of seizure disorder--may go  To [facility] if needed--eventually needs acute rehab. Complex Active General Medical Issues that complicate care Assess & Plan:     1.  Principal Problem:    SAH (subarachnoid hemorrhage) (HCC)  Active Problems:    Aspiration into respiratory tract    Depression    Severe protein-calorie malnutrition ([address]    C7 spinal cord injury ([address]    Cognitive change    Neurogenic bowel    Neurogenic bladder    Acute pain due to trauma    Acute metabolic encephalopathy    Hypokalemia    Acute cystitis with hematuria    Aspiration pneumonia (HCC)    Leukocytosis    Dysphagia, oropharyngeal phase    Right lower lobe pneumonia    Hypomagnesemia    Status epilepticus (HCC)    Traumatic hemorrhage of right cerebrum with loss of consciousness of 30 minutes or less ([address]    Spinal cord injury at T7-T12 level with complete spinal cord lesion ([address]  Resolved Problems:    Hyponatremia        Iris Mares D.O., PM&R     Attending    [address]

## 2021-04-15 NOTE — PROGRESS NOTES
bisacodyl (DULCOLAX) suppository 10 mg  10 mg Rectal Daily PRN Huey [place], APRN - CNP        bisacodyl (DULCOLAX) suppository 10 mg  10 mg Rectal Every Other Day Alejandra Scullin, DO   Stopped at 04/14/21 1813    glycerin (ADULT) suppository 2 g  1 suppository Rectal Every Other Day Alejandra Scullin, DO   Stopped at 04/14/21 1814    sodium chloride flush 0.9 % injection 10 mL  10 mL Intravenous 2 times per day Zuleima Huerta PA-C   10 mL at 04/15/21 8728    sodium chloride flush 0.9 % injection 10 mL  10 mL Intravenous PRN Zuleima Huerta PA-C        0.9 % sodium chloride infusion  25 mL Intravenous PRN Zuleima Huerta PA-C        promethazine (PHENERGAN) tablet 12.5 mg  12.5 mg Oral Q6H PRN Zuleima Huerta PA-C        Or    ondansetron [facility]) injection 4 mg  4 mg Intravenous Q6H PRN Zuleima Huerta PA-C        acetaminophen (TYLENOL) tablet 650 mg  650 mg Oral Q4H PRN Zuleima Huerta PA-C           PHYSICAL EXAM:    BP (!) 65/43   Pulse 78   Temp 97.6 °F (36.4 °C) (Axillary)   Resp 23   Ht 6' 1\" (1.854 m)   Wt 137 lb 9.1 oz (62.4 kg)   SpO2 98%   BMI 18.15 kg/m²   General Appearance:      Skin:  normal  CVS - Normal sounds, No murmurs , No carotid Bruits  RS -CTA  Abdomen Soft, BS present  Review of Systems   Mental Status Exam:             Level of Alertness:   awake            Orientation:   person, place, time               Funduscopic Exam:     Cranial Nerves          Cranial nerve III           Pupils:  equal, round, reactive to light      Cranial nerves III, IV, VI           Extraocular Movements: intact        Motor:    Drift:  absent  Motor exam is notable for weakness in his upper and lower extremities paraparetic and has weakness up to see 8 levels in the upper extremities with sitting up.   We did not attempt to walk him  Tone:  normal  Abnormal Movements:  absent            Sensory: Definite sensory level Dense atlas:Dens atlas relationship is unremarkable. Soft tissues:Airway patent. No soft tisssue mass or adenopathy. Other findings: Normal age-related bony changes. Extreme motion artifact. No definite fracture. EXAMINATION: CT CERVICAL SPINE WO CONTRAST  DATE AND TIME:4/7/2021 6:44 PM CLINICAL HISTORY: Severe headache.  trauma  COMPARISON: None TECHNIQUE:Axial CT from skull base to vertex without  contrast..  All CT scans at this facility use dose modulation, iterative reconstruction, and/or weight based dosing when appropriate to reduce radiation dose to as low as reasonably achievable. FINDINGS. Bilateral frontal areas of hemorrhagic contusion with bifrontal frontal subarachnoid and subdural hemorrhage. Small subdural hemorrhages extend along the inner calvarium bilaterally extending approximately 4 mm deep to the inner calvarium. There is no midline shift. Small amount of subdural blood along the anterior falx. Small focus of hemorrhagic contusion along the left anterior temporal lobe. No intraventricular blood. No significant parenchymal edema demonstrated at this time. No other areas of acute hemorrhage. 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 . Globes intact. No acute fracture. Paranasal sinuses and mastoids are clear. These findings were discussed with the ER doctor IMPRESSION: BIFRONTAL HEMORRHAGIC CONTUSIONS WITH SMALL BILATERAL SUBDURAL HEMATOMAS. NO ACUTE FRACTURE     Ct Head Wo Contrast    Result Date: 4/8/2021  CT HEAD WO CONTRAST CLINICAL HISTORY: Bifrontal subdural hematoma with subarachnoid hemorrhage., Follow-up. Comparison: April 7, 2021 1838 hours TECHNIQUE: Multiple unenhanced serial axial images of the brain from the vertex of the skull to the base of the skull were performed. FINDINGS: The ventricles are dilated. Unchanged size configuration. Mo.  No mass. No midline shift. The cisterns are patent.  There is developing area of hemorrhagic parenchymal contusion involving the left posterior inferior aspect of the parietal lobe. There is small subarachnoid hemorrhage noted. Again note is made of bifrontal subdural hematomas with extension of the subdural hematoma into the interhemispheric fissure. The left subdural hematoma extends overlying the left frontal lobe to the level of the posterior aspect of the left frontal lobe. There is associated bifrontal hemorrhagic contusions. The multiple parenchymal contusions are best appreciated on the coronal reconstructions series 601 image 34. There is been some interval enlargement of the right as compared to the prior examination. There is now some increasing surrounding vasogenic edema There are now some scattered areas increased attenuation seen within the base of the left frontal lobe series 2 image 15 and small foci within the superior aspect of the left frontal lobe, series 2 image 2523. Either represent areas of hemorrhage. The visualized osseous structures are unremarkable. The visualized portion of the paranasal sinuses, and mastoids are unremarkable. IMPRESSION 1. INTERVAL DEVELOPMENT OF ACUTE HEMORRHAGIC PARENCHYMAL CONTUSIONS WITH SMALL SUBARACHNOID COMPONENTS IN THE LEFT POSTERIOR INFERIOR PARIETAL REGION. 2. THERE IS SMALL AREAS OF NEW INCREASED ATTENUATION DESCRIBED ABOVE WITHIN THE LEFT FRONTAL LOBE. FINDINGS REPRESENT SMALL AREAS OF PARENCHYMAL CONTUSION, HEMORRHAGE. 3. BIFRONTAL SUBDURAL HEMATOMAS WITH ASSOCIATED PARENCHYMAL HEMORRHAGIC CONTUSIONS AS DESCRIBED ABOVE. RECOMMEND MRI TO FURTHER EVALUATE All CT scans at this facility use dose modulation, iterative reconstruction, and/or weight based dosing when appropriate to reduce radiation dose to as low as reasonably achievable. Ct Head Wo Contrast    Result Date: 4/7/2021  COMPARISON: September 18, 2020.  HISTORY:  fell off a wheel chair slight hematoma  occiput TECHNIQUE: The study was performed without contrast FINDINGS: In the bifrontal regions there is hyperdensity seen in the sulci and falx. The ventricular system is midline with no evidence for hydrocephalus. There is soft tissue prominence also seen The visualized paranasal sinuses and orbits appear unremarkable. No lytic or blastic changes seen in the calvarium. Bifrontal subarachnoid hemorrhage is seen. This was discussed with Dr. Je Villela on today's date at 1:35 All CT scans at this facility use dose modulation, iterative reconstruction, and/or weight based dosing when appropriate to reduce radiation dose to as low as reasonably achievable. Ct Cervical Spine Wo Contrast    Result Date: 4/12/2021  EXAMINATION: CT CERVICAL SPINE WO CONTRAST   DATE AND TIME:4/7/2021 6:44 PM CLINICAL HISTORY:Acute posterior neck pain  trauma  COMPARISON: None TECHNIQUE:Helical scanning was performed from the skull base through the remainder of the cervical spine without intravenous contrast. Sagittal and coronal reformats were obtained. The lack of contrast limits CT sensitivity of the soft tissues. All CT scans at this facility use dose modulation, iterative reconstruction, and/or weight based dosing when appropriate to reduce radiation dose to as low as reasonably achievable. Josias Robledoh FINDINGS  Severe motion artifact limits exam quality inaccuracy Fracture:Extreme motion artifact motion artifact. No obvious fracture. Consider plain film radiography the C-spine if clinical concerns persist Dense atlas:Dens atlas relationship is unremarkable. Soft tissues:Airway patent. No soft tisssue mass or adenopathy. Other findings: Normal age-related bony changes. Extreme motion artifact. No definite fracture.    EXAMINATION: CT CERVICAL SPINE WO CONTRAST  DATE AND TIME:4/7/2021 6:44 PM CLINICAL HISTORY: Severe headache.  trauma  COMPARISON: None TECHNIQUE:Axial CT from skull base to vertex without  contrast..  All CT scans at this facility use dose modulation, iterative reconstruction, and/or weight based dosing when appropriate to reduce radiation dose to as low as reasonably achievable. FINDINGS. Bilateral frontal areas of hemorrhagic contusion with bifrontal frontal subarachnoid and subdural hemorrhage. Small subdural hemorrhages extend along the inner calvarium bilaterally extending approximately 4 mm deep to the inner calvarium. There is no midline shift. Small amount of subdural blood along the anterior falx. Small focus of hemorrhagic contusion along the left anterior temporal lobe. No intraventricular blood. No significant parenchymal edema demonstrated at this time. No other areas of acute hemorrhage. 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 . Globes intact. No acute fracture. Paranasal sinuses and mastoids are clear. These findings were discussed with the ER doctor IMPRESSION: BIFRONTAL HEMORRHAGIC CONTUSIONS WITH SMALL BILATERAL SUBDURAL HEMATOMAS. NO ACUTE FRACTURE       Recent Labs     04/13/21  0454 04/14/21  0453 04/15/21  0506 04/15/21  1033 04/15/21  1152   WBC 13.1* 10.5 9.1  --   --    HGB 12.3* 11.3* 11.5* 10.0* 10.2*    229 206  --   --      Recent Labs     04/13/21 0454 04/14/21  0453 04/15/21  0506 04/15/21  1033 04/15/21  1152    145* 150*  --   --    K 3.7 3.6 4.0  --   --     111* 114*  --   --    CO2 23 28 26  --   --    BUN 11 16 19  --   --    CREATININE 0.29* 0.32* 0.44* 0.6* 0.7*   GLUCOSE 111* 138* 135*  --   --      No results for input(s): BILITOT, ALKPHOS, AST, ALT in the last 72 hours. Lab Results   Component Value Date    PROTIME 13.8 04/07/2021    INR 1.1 04/07/2021     No results found for: LITHIUM, DILFRTOT, VALPROATE    ASSESSMENT AND PLAN    Right cerebral hematoma secondary to trauma. Patient was seen for status epilepticus. Patient is on Dilantin 100 mg 3 times a day for now. We will keep him on IV for 1 more day and switch this to oral tomorrow.   Will decrease his Keppra to 500 mg twice a day for now and keep him on this for 1 or 2 days and then taper this off once we

## 2021-04-15 NOTE — PROGRESS NOTES
B/P 98/47 map 58, called Trauma PA see new orders. Bolus started as orded and wife at bedside updated.

## 2021-04-15 NOTE — PROGRESS NOTES
Comprehensive Nutrition Assessment    Type and Reason for Visit:  Reassess    Nutrition Recommendations/Plan:   Continue with 1.5 with Fiber TF ( Jevity 1.5) @ 50 ml/hr x 24 hrs via NG. Recommend water flushes 200 ml, 6 x daily until hypernatremia corrected. If dilantin changed from IV, will need to adjsut EN goal rate. Weight ordered for furhter assessment of adequacy of EN  May need to consider PEG placement for long term nutrition /hydration    Nutrition Assessment: Severe Malnutrition improving with  current EN providing kcals/protein within range of estimated needs. Recommend 200 ml free water flushes  4 x daily,  Noted currently recieving IV dilantin, will need to adjust EN if changed to po. May need to consider PEG placement for lng term nutrition/hydration. Noted no BM x 8 days, bowel meds in progress  Malnutrition Assessment:  Malnutrition Status:  Severe malnutrition    Context:  Acute Illness     Findings of the 6 clinical characteristics of malnutrition:  Energy Intake:  No significant decrease in energy intake  Weight Loss:  Unable to assess     Body Fat Loss:  7 - Moderate body fat loss Orbital, Buccal region   Muscle Mass Loss:  7 - Moderate muscle mass loss Temples (temporalis), Clavicles (pectoralis & deltoids)  Fluid Accumulation:  Unable to assess     Strength:  Not Performed    Estimated Daily Nutrient Needs:  Energy (kcal):  ~1860 kcals @ 30 kcal/kg; Weight Used for Energy Requirements:  Current     Protein (g):  81 g protein @ 1.3 g/kg; Weight Used for Protein Requirements:  Admission(62 kg)        Fluid (ml/day):  ~1900; Method Used for Fluid Requirements:  1 ml/kcal      Nutrition Related Findings:  Pt had CorPak placed 4/12 with TF starte due to dysphagia. Abdomen noted to be soft per nursing, still no BM  ( 4/7) ( on colace, dulcolaxm senokot), IV dilantin, , I/O = 3823/1950, trace , generalized edema present.  Na remains elevated = 150 * 145 yest), glucose < 160, No recent weight avaialbe, per rounds awaiting tx to another hospital      Wounds:  (abrasion from fall, back of head)       Current Nutrition Therapies:    Current Tube Feeding (TF) Orders:  · Feeding Route: Nasogastric  · Formula: 1.5 Calorie with Fiber  · Schedule: Continuous @ 50 ml/hr  · Water Flushes: none ordered  · Current TF & Flush Orders Provides: 1800 kcals, 76 g protien, 912 ml free water  · Goal TF & Flush Orders Provides: 1800 kcals, 76 g protien, 912 ml free water    Anthropometric Measures:  · Height: 6' 1\" (185.4 cm)  · Current Body Weight: 137 lb 8 oz (62.4 kg)(4/10)   · Admission Body Weight: 137 lb (62.1 kg)(method unknwn)    · Usual Body Weight: 155 lb (70.3 kg)(9/2020 stated; 150lb (11/2019 [facility]))     · Ideal Body Weight: 184 lbs;   · BMI: 18.1  · Adjusted Body Weight: 147.8; Paraplegia   · Adjusted BMI: 19.5    · BMI Categories: Underweight (BMI less than 18.5)       Nutrition Diagnosis:   · Inadequate oral intake related to acute injury/trauma, swallowing difficulty as evidenced by NPO or clear liquid status due to medical condition, swallow study results    Nutrition Interventions:   Food and/or Nutrient Delivery:  Continue Current Tube Feeding(Continue with 1.5 with Fiber TF ( Jevity 1.5) @ 50 ml/hr x 24 hrs via NG. Recommend water flushes 200 ml, 6 x daily until hypernareia corrected. If dilantin changed from IV, will need to adjsut EN goal rate. Weight ordered for furhter assessment)  Nutrition Education/Counseling:  No recommendation at this time   Coordination of Nutrition Care:  Continue to monitor while inpatient    Goals:  EN to meet estimated nutritional needs. wt gain. electrolytes wnl.        Nutrition Monitoring and Evaluation:      Food/Nutrient Intake Outcomes:  Enteral Nutrition Intake/Tolerance  Physical Signs/Symptoms Outcomes:  Weight, Biochemical Data, Chewing or Swallowing     Discharge Planning:    Enteral Nutrition     Electronically signed by Nick Avalos RD, LD on 4/15/21 at 3:05 PM EDT

## 2021-04-15 NOTE — PROGRESS NOTES
Pharmacy Vancomycin Consult     Vancomycin Day: 4  Current Dosing: Vacomycin 1000 mg IV q8h  Indication: PNA  Also receiving Cefepime 1 gm IV q12h    Temp max:  95    Recent Labs     04/14/21  0453 04/15/21  0506 04/15/21  0506 04/15/21  1152 04/15/21  1312   BUN 16 19  --   --   --    CREATININE 0.32* 0.44*   < > 0.7* 0.7*   WBC 10.5 9.1  --   --   --     < > = values in this interval not displayed. Culture Date      Source                       Result    Culture, MRSA, Screening [[id number]]   Collected: 04/12/21 1648;  Order Status: Completed   Lab Status: Final result;  Updated: 04/14/21 0808   Specimen: Nasal from Nares   MRSA Culture Only:   No MRSA isolated    Culture, Urine [[id number]] (Abnormal)  Collected: 04/11/21 1907;   Order Status: Completed   Lab Status: Final result;  Updated: 04/14/21 0807 Specimen: Urine, indwelling catheter   Organism Klebsiella oxytoca  >100,000 CFU/ml    Ht Readings from Last 1 Encounters:   04/10/21 6' 1\" (1.854 m)        Wt Readings from Last 1 Encounters:   04/10/21 137 lb 9.1 oz (62.4 kg)       Body mass index is 18.15 kg/m². Estimated Creatinine Clearance: 100 mL/min (A) (based on SCr of 0.7 mg/dL (L)). Trough: 19.5 (7 hr 32 min after last dose)     Assessment/Plan:  Trough approaching upper limit of target range, will extend interval to 12hr; give 1 gm IV x 1 with reassessment based on renal function and random level 4/16 @ 05:00.     Citlalli Gaston, Vishnu 4/15/2021 2:20 PM

## 2021-04-15 NOTE — FLOWSHEET NOTE
Pt opens eyes to name being said. Does not follow commands when asked to squeeze hands. Pt attempts to verbally respond, is difficult to understand. IVF infusing without problem. Dey catheter draining yellow urine. Pt's wife in to visit. 1100 AGBs done per RT. Pt put on bipap. 80 Dr. Guillermo Rodrigez at bedside for procedure. 1800 Wife at bedside to visit. [address] with [facility] transport dept. They are planning to accept pt tonight. Dr. Ana Lopez aware. 1940 Attempted to call report to [facility] ICU. Per nurse I spoke with, there is a patient in bed 2 already. She is going to call back when she has information on where pt is assigned to go. 1950 [facility] ICU ground transporters are here. I spoke with [facility] ICU again. They do not have bed assignment yet. Transporters calling to check into it. 2000 Spoke with [facility] ICU nurse Asha Hess. She is unaware of pt coming to their unit. They are not taking report at this time. 2032 Pt left with [facility] ICU ground transport. Report called to [facility] MICU.

## 2021-04-15 NOTE — PROGRESS NOTES
(36.7 °C)  Max: 98.7 °F (37.1 °C)  Respirations: Resp  Av.7  Min: 17  Max: 29  Pulse: Pulse  Av  Min: 78  Max: 94  Blood Pressure: Systolic (55CBU), FHP:36 , Min:77 , CKY:399   ; Diastolic (32DGF), VXZ:61, Min:48, Max:83    SpO2: SpO2  Av.5 %  Min: 93 %  Max: 100 %    24hr Intake/Output:     Intake/Output Summary (Last 24 hours) at 4/15/2021 0803  Last data filed at 4/15/2021 0558  Gross per 24 hour   Intake 3823 ml   Output 1950 ml   Net 1873 ml       Vitals: /83   Pulse 81   Temp 98.7 °F (37.1 °C) (Tympanic)   Resp 18   Ht 6' 1\" (1.854 m)   Wt 137 lb 9.1 oz (62.4 kg)   SpO2 99%   BMI 18.15 kg/m²     Physical Exam:    Constitutional: Lying in bed, HOB elevated. Drowsy and requiring persistent verbal stimuli to arouse  HEENT: Atraumatic normocephalic. Corpak in place. L pupil >R pupil, reactive to light (consistent with previous pupil exams)  Cardiovascular: Regular rate and rhythm. Palpable radial and DP pulses bilaterally. Pulmonary: Rhonchi noted throughout bilaterally upper> lower. Diminished in bilateral bases. No wheezing or rales. Increased work of breathing from previous day. O2 saturations >92% on NRB  Abdominal: Soft. Non-distended. Non-tender. : Indwelling toscano catheter in place (chronic) , draining clear, yellow urine.   Musculoskeletal: PROM intact BUE, not following commands to move BUE, no motor in BLE at baseline secondary to prior SCI.  No peripheral edema appreciated. Neurological: Open eyes with persistent verbal stimuli, Patient grunting appropriately to yes/no questions, grasps hands to command. GCS 13 (E3, V4, M6). Weakness in BUE compared to previous exams with minimal symmetric  strength. Paraplegia with absence of voluntary motor in BLE. Of note, hands contracted/spastic at baseline.   Skin: Warm and dry     LABORATORY RESULTS (LAST 24 HOURS)     CBC with Differential:    Lab Results   Component Value Date    WBC 9.1 04/15/2021    RBC 3.80 04/15/2021 HGB 11.5 04/15/2021    HCT 34.1 04/15/2021     04/15/2021    MCV 89.7 04/15/2021    MCH 30.3 04/15/2021    MCHC 33.8 04/15/2021    RDW 13.8 04/15/2021    BANDSPCT 20 04/12/2021    METASPCT 3 04/12/2021    LYMPHOPCT 5.4 04/15/2021    MONOPCT 6.2 04/15/2021    BASOPCT 0.2 04/15/2021    MONOSABS 0.6 04/15/2021    LYMPHSABS 0.5 04/15/2021    EOSABS 0.0 04/15/2021    BASOSABS 0.0 04/15/2021     CMP:    Lab Results   Component Value Date     04/15/2021    K 4.0 04/15/2021     04/15/2021    CO2 26 04/15/2021    BUN 19 04/15/2021    CREATININE 0.44 04/15/2021    GFRAA >60.0 04/15/2021    LABGLOM >60.0 04/15/2021    GLUCOSE 135 04/15/2021    PROT 7.0 04/07/2021    LABALBU 4.5 04/07/2021    CALCIUM 8.5 04/15/2021    BILITOT 0.6 04/07/2021    ALKPHOS 76 04/07/2021    AST 22 04/07/2021    ALT 23 04/07/2021     Magnesium:    Lab Results   Component Value Date    MG 2.0 04/15/2021     PT/INR:    Lab Results   Component Value Date    PROTIME 13.8 04/07/2021    INR 1.1 04/07/2021     PTT:  No results found for: APTT, PTT    IMAGING RESULTS (PERSONALLY REVIEWED)     No new imaging today. Dr. Fe Delgado during AM rounds reports that EEG did not show subclinical seizures      ASSESSMENT & PLAN     Diagnoses:  1. S/p found down at home on 4/7/21  2. Bifrontal subarachnoid hemorrhage (NSGY, non-op)  3. Acute metabolic encephalopathy  4. Left parietal subarachnoid hemorrhage (NSGY, non-op)  5. Hypokalemia   6. Neurogenic bladder  7. Neurogenic bowel  8. Occipital skull fracture extending to the foramen magnum (CTA negative)  9. Leukocytosis  10. Aspiration PNA  11. Status Epilepticus (4/14, Neurology consulted)  12.  Respiratory Acidosis     PMHx: Chronic pain, paraplegia BLE from SCI at C[facility] in 1987     Incidental Findings: None (Reviewed by JLR, 4/8/21)        ASSESSMENT/PLAN:  Neurological:   Bifrontal subarachnoid hemorrhage. [facility] 4/8/21 with interval development of bifrontal contusions and new area of SAH at left parietal region as well as bifrontal SDH, stable on subsequent imaging. Mental status improved on 4/9 with decline from 4/10-present. Status epilepticus overnight/AM on 4/13-4/14. Mental status improved today at GCS 13 (9). Per wife, patient with chronic back pain and worsened at baseline when in supine position. Occipital skull fracture extending to the foramen magnum. CTA negative for BCVI. - Neurology consulted for status epilepticus, appreciate recs  - Continue Dilantin 100 mg Q8hr IV. Change to oral tomorrow. - Keppra decreased to 500mg BID. Continue for 1-2 more days then taper off once Dilantin level improves  - EEG negative for seizure activity  - NSGY consulted, appreciate recs  - Continue Keppra, follow neurology recs  - Continue home Klonopin 4 mg HS  - Continue seizure precautions  - SLP consulted for cognitive evaluation and swallow evaluation: repeat eval on 4/13 consistent with previous eval. Continue NPO due to aspiration risk. Not appropriate for MBS at this time. - Continue PO Tylenol 650mg q 4hrs prn mild/moderate pain  - Avoiding opioid medications in the setting of worsening mental status on 4/12     Cardiovascular:   No acute cardiovascular issues. SBP's 90s-120s overnight. no tachycardia appreciated.   - Continue IV Hydralazine and Labetalol PRN SBP greater than 160mmHg (not required in last 24 hrs)     Respiratory:   Saturating appropriately at >92% on NRB overnight. Not on O2 at home. Increased work of breathing noted this AM. ABG with respiratory acidosis with pCO2 of 74. CXR completed 4/14 with stable RLL consolidation and interval progression of left lung infiltrate and concern for aspiration PNA.   - Bipap started with IPAP 12, EPAP 6, RR 18.   - Repeat ABG after 1hr of Bipap   - Maintain O2 sat >92%  - Continue pulmonary toilet  - Vest therapy Q6h scheduled with albuterol treatments  - Do not require repeat CXR at this time.      GI/Diet:  Bedside swallow last completed on 4/13 by SLP and EPAP 6 with RR at 18. - However, mental status improving on Bipap. Eyes spontaneously open and patient vocally responding with mumbled speech. 1:15 ABG: called from respiratory with pH and pCO2 still w/o any improvement. - Improved mental status stable  - Will increase IPAP to 16 and EPAP 6 with RR at 18.  - RT to reevaluate ABG at 3/3:15pm    Bonita Weems.  Emergency General Surgery  [phone] (1A-7B)  [phone]    Teaching Physician Note:    I am covering as primary trauma and critical care attending today. I have personally performed a face to face diagnostic  evaluation on this patient. I have reviewed and agree with the care plan. History and exam by me demonstrates: a patient with CO2 retention that we were unable to correct with noninvasive metholodolgy (bipap) despite increasing settings;  Kept his with updated throughout the day and eventually discussed that intubating him would be the safest course. I asked anesthesia as I was in the ED and the time; and they qucikly and promptly intubated; his pressures needed support post meds/intubtion    Abg markedly improved most recently. Still awaiting transfer to [facility]. I spent approximately 90 minutes of ICU time with the patient today; Will consider bronch tomorrow if he deteriorates or does not look better tomorrow.     Charmayne Bullock

## 2021-04-15 NOTE — CARE COORDINATION
ICU ROUNDS COMPLETED THIS AM WITH TEAM. PT AWAITING BED FOR TRANSFER TO [facility]. WILL FOLLOW AS NEEDED UNTIL TRANSFER.

## 2021-04-15 NOTE — FLOWSHEET NOTE
Patient is in bed,non verbal but opened his eyes when his name was called,heart rate regular at 68 beats/min. No jugular vein distention,lungs diminished through out lung fields, left nare korpark intact,he is on a non-rebreather mask,toscano draining adequate amount of xenia urine. iv sites have no signs of IVF infiltrations. his fingers are slightly contracted,left pupil is bigger than his right pupil . 2020 patient was repositioned to his right side and leelee KELLYis in to help. 2210 he is sleeping ,no distress. 23:15 patient mouth care was provided,his mouth    01:50 inserted a 18 gauge into his right dorsal vein ,he tolerated the iv insertion well. 0410 patient defecated a small soft stool,so bed bath was provided,no words  Uttered by the patient. 0500 patient korpack placement was verified via gastric aspiration,aspirated 15 ml. Of green liquid,flushed with 25 ml of tap water prior to starting his tube feeding at 25 ml. An hour with a goal of 50  Ml/hr. 06:07 patient is sleeping,his breathing is even and effortless 99% pulse ox in the NRM. emptied 950 ml. Of yellow urine with visible sediments,no pungent odor emitted .

## 2021-04-15 NOTE — PROGRESS NOTES
[facility]   Facility/Department: [facility]  Speech Language Pathology    NAME:Cam Luna  : 1960  ROOM: IC01/IC01-01      DATE: 4/15/2021      This patient was being seen by Speech Therapy for:  Dysphagia Treatment    However, due to this patient's change in medical status, Speech Therapy will require new orders to continue to follow the patient. Please re-order, as needed.        Thank you,  Porfirio Wagner, JONY-SLP, Date: 4/15/2021, Time: 7:58 AM

## 2021-04-16 NOTE — CARE COORDINATION
[facility] Update     2021    Patient: Bessy Fitch Patient : 1960   MRN: [id number]  Reason for Admission: 2021 - 4/15/2021 [facility]  Discharge Date: 4/15/21 RARS: Readmission Risk Score: 10  CT       Care Transitions Post Acute Facility Update    Care Transitions Interventions  Post Acute Facility: [address] [facility] Update

## 2021-04-18 PROCEDURE — 95816 EEG AWAKE AND DROWSY: CPT | Performed by: PSYCHIATRY & NEUROLOGY

## 2021-04-19 NOTE — PROCEDURES
[address]                      [address] [place], [address] [facility]                          ELECTROENCEPHALOGRAM REPORT    PATIENT NAME: Abdullahi Bermeo                    :        1960  MED REC NO:   [id number]                            ROOM:       [facility]  ACCOUNT NO:   [de-identified]                           ADMIT DATE: 2021  PROVIDER:     Renuka Monaco MD    DATE OF EE2021    MEDICATIONS:  Klonopin, Keppra, Dilantin. EEG FINDINGS:  This is a spontaneous 21-channel EEG recording for this  patient with status epilepticus. The background rhythm of this EEG  shows intermittent slowing with surface muscle artifacts. There is no  suggestion of other asymmetries. Drowsy patterns are intermixed with  arousals. The record remains otherwise symmetrical.  There is no  epileptiform discharge or triphasic waves. The tremulousness appears to be surface muscle artifacts. IMPRESSION:  This is a mildly abnormal EEG recording by the virtue of  diffuse slowing with surface muscle artifacts. Clinical correlation is  recommended.         Indio Mckeon MD    D: 2021 12:23:51       T: 2021 12:25:37     [id number]  Job#: [id number]     Doc#: [id number]    CC:

## 2024-05-14 NOTE — FLOWSHEET NOTE
Cleared for Inpatient Heart transplant consult only     Pt arrived to 2W via cart. Pt is only oriented to his self but does follow commands. Pt denies pain. PERRLA. Pt has a chronic toscano.